# Patient Record
Sex: MALE | Race: WHITE | NOT HISPANIC OR LATINO | Employment: OTHER | URBAN - METROPOLITAN AREA
[De-identification: names, ages, dates, MRNs, and addresses within clinical notes are randomized per-mention and may not be internally consistent; named-entity substitution may affect disease eponyms.]

---

## 2023-03-08 LAB
INR IN PPP BY COAGULATION ASSAY: 3.2 (ref 0.9–1.1)
PROTHROMBIN TIME (PT) IN PPP BY COAGULATION ASSAY: 37 SEC (ref 9.8–13.4)

## 2023-03-10 ENCOUNTER — TELEPHONE (OUTPATIENT)
Dept: PRIMARY CARE | Facility: CLINIC | Age: 83
End: 2023-03-10
Payer: COMMERCIAL

## 2023-03-10 NOTE — TELEPHONE ENCOUNTER
----- Message from ROULA Gillespie sent at 3/9/2023  2:53 PM EST -----  Regarding: RE: coumadin  Recommend he continue with the current dose. Given INR was slightly elevated, let's recheck it again next week.   ----- Message -----  From: Zoraida Morales MA  Sent: 3/9/2023   2:27 PM EST  To: ROULA Gillespie  Subject: coumadin                                         Called patient and told him result and asked, he takes 10 mg 5 days a week, skipping Sunday and wednesdays.  ----- Message -----  From: ROULA Gillespie  Sent: 3/8/2023   4:53 PM EST  To:  [place] [id number] Clinical Support Staff    Please let him know his INR today was 3.2.  What is his current dose of Coumadin? How long has he been on the current dose?

## 2023-03-10 NOTE — TELEPHONE ENCOUNTER
----- Message from ROULA Gillespie sent at 3/9/2023  2:56 PM EST -----  Regarding: RE: coumadin  Recommend he continue the current dose. Given it was slightly elevated, let's recheck INR again next week.  ----- Message -----  From: Zoraida Morales MA  Sent: 3/9/2023   2:27 PM EST  To: ROULA Gillespie  Subject: coumadin                                         Called patient and told him result and asked, he takes 10 mg 5 days a week, skipping Sunday and wednesdays.  ----- Message -----  From: ROULA Gillespie  Sent: 3/8/2023   4:53 PM EST  To:  [place] [id number] Clinical Support Staff    Please let him know his INR today was 3.2.  What is his current dose of Coumadin? How long has he been on the current dose?

## 2023-03-10 NOTE — TELEPHONE ENCOUNTER
----- Message from ROULA Gillespie sent at 3/9/2023  5:35 PM EST -----  Regarding: RE: coumadin  Please have him continue the current dose of Coumadin. Since it is slightly elevated from the therapeutic range, recommend rechecking next week.  ----- Message -----  From: Zoraida Morales MA  Sent: 3/9/2023   2:27 PM EST  To: ROULA Gillespie  Subject: coumadin                                         Called patient and told him result and asked, he takes 10 mg 5 days a week, skipping Sunday and wednesdays.  ----- Message -----  From: ROULA Gillespie  Sent: 3/8/2023   4:53 PM EST  To:  [place] [address] Clinical Support Staff    Please let him know his INR today was 3.2.  What is his current dose of Coumadin? How long has he been on the current dose?

## 2023-03-13 ENCOUNTER — TELEPHONE (OUTPATIENT)
Dept: PRIMARY CARE | Facility: CLINIC | Age: 83
End: 2023-03-13
Payer: COMMERCIAL

## 2023-03-13 PROBLEM — Z96.651 STATUS POST TOTAL RIGHT KNEE REPLACEMENT: Status: ACTIVE | Noted: 2023-03-13

## 2023-03-13 PROBLEM — E66.01 CLASS 2 SEVERE OBESITY WITH SERIOUS COMORBIDITY AND BODY MASS INDEX (BMI) OF 38.0 TO 38.9 IN ADULT (MULTI): Status: ACTIVE | Noted: 2023-03-13

## 2023-03-13 PROBLEM — R60.0 LEG EDEMA, RIGHT: Status: ACTIVE | Noted: 2023-03-13

## 2023-03-13 PROBLEM — E11.9 TYPE 2 DIABETES MELLITUS (MULTI): Status: ACTIVE | Noted: 2023-03-13

## 2023-03-13 PROBLEM — L03.115 CELLULITIS OF RIGHT LOWER LEG: Status: ACTIVE | Noted: 2023-03-13

## 2023-03-13 PROBLEM — D64.9 ANEMIA: Status: ACTIVE | Noted: 2023-03-13

## 2023-03-13 PROBLEM — I51.7 DILATED VENTRICLE: Status: ACTIVE | Noted: 2023-03-13

## 2023-03-13 PROBLEM — R06.02 SOB (SHORTNESS OF BREATH) ON EXERTION: Status: ACTIVE | Noted: 2023-03-13

## 2023-03-13 PROBLEM — M79.604 RIGHT LEG PAIN: Status: ACTIVE | Noted: 2023-03-13

## 2023-03-13 PROBLEM — M17.11 ARTHRITIS OF RIGHT KNEE: Status: ACTIVE | Noted: 2023-03-13

## 2023-03-13 PROBLEM — I87.1 MAY-THURNER SYNDROME: Status: ACTIVE | Noted: 2023-03-13

## 2023-03-13 PROBLEM — C61 PROSTATE CANCER (MULTI): Status: ACTIVE | Noted: 2023-03-13

## 2023-03-13 PROBLEM — R17 TOTAL BILIRUBIN, ELEVATED: Status: ACTIVE | Noted: 2023-03-13

## 2023-03-13 PROBLEM — E78.5 HYPERLIPIDEMIA: Status: ACTIVE | Noted: 2023-03-13

## 2023-03-13 PROBLEM — I10 BENIGN ESSENTIAL HYPERTENSION: Status: ACTIVE | Noted: 2023-03-13

## 2023-03-13 PROBLEM — N52.9 ED (ERECTILE DYSFUNCTION): Status: ACTIVE | Noted: 2023-03-13

## 2023-03-13 PROBLEM — I48.91 ATRIAL FIBRILLATION (MULTI): Status: ACTIVE | Noted: 2023-03-13

## 2023-03-13 PROBLEM — N40.0 BPH (BENIGN PROSTATIC HYPERPLASIA): Status: ACTIVE | Noted: 2023-03-13

## 2023-03-13 PROBLEM — R60.9 EDEMA: Status: ACTIVE | Noted: 2023-03-13

## 2023-03-13 PROBLEM — E66.812 CLASS 2 SEVERE OBESITY WITH SERIOUS COMORBIDITY AND BODY MASS INDEX (BMI) OF 38.0 TO 38.9 IN ADULT: Status: ACTIVE | Noted: 2023-03-13

## 2023-03-13 RX ORDER — PNV NO.95/FERROUS FUM/FOLIC AC 28MG-0.8MG
1 TABLET ORAL DAILY
COMMUNITY
Start: 2020-11-19

## 2023-03-13 RX ORDER — VIT A/VIT C/VIT E/ZINC/COPPER 4296-226
CAPSULE ORAL
COMMUNITY
End: 2024-04-05 | Stop reason: SDUPTHER

## 2023-03-13 RX ORDER — GLIMEPIRIDE 2 MG/1
1 TABLET ORAL DAILY
COMMUNITY
Start: 2023-02-08 | End: 2023-07-11

## 2023-03-13 RX ORDER — ACETAMINOPHEN 500 MG
TABLET ORAL
COMMUNITY

## 2023-03-13 RX ORDER — METOPROLOL TARTRATE 50 MG/1
0.5 TABLET ORAL 2 TIMES DAILY
COMMUNITY
Start: 2014-01-23 | End: 2024-01-02

## 2023-03-13 RX ORDER — TERAZOSIN 5 MG/1
1 CAPSULE ORAL DAILY
COMMUNITY
Start: 2012-09-26 | End: 2023-12-04

## 2023-03-13 RX ORDER — SIMVASTATIN 20 MG/1
1 TABLET, FILM COATED ORAL DAILY
COMMUNITY
Start: 2012-09-26 | End: 2024-01-02

## 2023-03-13 RX ORDER — LISINOPRIL 10 MG/1
1 TABLET ORAL DAILY
COMMUNITY
Start: 2012-09-26 | End: 2024-01-02

## 2023-03-13 RX ORDER — ASPIRIN 81 MG/1
TABLET ORAL
COMMUNITY
End: 2023-12-04

## 2023-03-13 RX ORDER — WARFARIN 10 MG/1
1 TABLET ORAL DAILY
COMMUNITY
Start: 2012-09-26 | End: 2023-04-14 | Stop reason: DRUGHIGH

## 2023-03-13 RX ORDER — METFORMIN HYDROCHLORIDE 500 MG/1
2 TABLET ORAL 2 TIMES DAILY
COMMUNITY
Start: 2012-09-26 | End: 2024-01-02

## 2023-03-13 NOTE — TELEPHONE ENCOUNTER
----- Message from ROULA Gillespie sent at 3/9/2023  2:56 PM EST -----  Regarding: RE: coumadin  Recommend he continue the current dose. Given it was slightly elevated, let's recheck INR again next week.  ----- Message -----  From: Zoraida Morales MA  Sent: 3/9/2023   2:27 PM EST  To: ROULA Gillespie  Subject: coumadin                                         Called patient and told him result and asked, he takes 10 mg 5 days a week, skipping Sunday and wednesdays.  ----- Message -----  From: ROULA Gillespie  Sent: 3/8/2023   4:53 PM EST  To:  [place] [address] Clinical Support Staff    Please let him know his INR today was 3.2.  What is his current dose of Coumadin? How long has he been on the current dose?

## 2023-03-15 ENCOUNTER — LAB (OUTPATIENT)
Dept: LAB | Facility: LAB | Age: 83
End: 2023-03-15
Payer: COMMERCIAL

## 2023-03-15 DIAGNOSIS — I48.91 ATRIAL FIBRILLATION, UNSPECIFIED TYPE (MULTI): ICD-10-CM

## 2023-03-15 DIAGNOSIS — Z51.81 ENCOUNTER FOR THERAPEUTIC DRUG MONITORING: ICD-10-CM

## 2023-03-15 LAB
INR IN PPP BY COAGULATION ASSAY: 3.8 (ref 0.9–1.1)
PROTHROMBIN TIME (PT) IN PPP BY COAGULATION ASSAY: 45.1 SEC (ref 9.8–13.4)

## 2023-03-15 PROCEDURE — 36415 COLL VENOUS BLD VENIPUNCTURE: CPT

## 2023-03-15 PROCEDURE — 85610 PROTHROMBIN TIME: CPT

## 2023-03-16 ENCOUNTER — OFFICE VISIT (OUTPATIENT)
Dept: PRIMARY CARE | Facility: CLINIC | Age: 83
End: 2023-03-16
Payer: COMMERCIAL

## 2023-03-16 ENCOUNTER — TELEPHONE (OUTPATIENT)
Dept: PRIMARY CARE | Facility: CLINIC | Age: 83
End: 2023-03-16

## 2023-03-16 VITALS
HEIGHT: 70 IN | WEIGHT: 273 LBS | BODY MASS INDEX: 39.08 KG/M2 | TEMPERATURE: 97.9 F | DIASTOLIC BLOOD PRESSURE: 78 MMHG | SYSTOLIC BLOOD PRESSURE: 138 MMHG

## 2023-03-16 DIAGNOSIS — G63 POLYNEUROPATHY ASSOCIATED WITH UNDERLYING DISEASE (MULTI): Primary | ICD-10-CM

## 2023-03-16 PROCEDURE — 1160F RVW MEDS BY RX/DR IN RCRD: CPT | Performed by: NURSE PRACTITIONER

## 2023-03-16 PROCEDURE — 1036F TOBACCO NON-USER: CPT | Performed by: NURSE PRACTITIONER

## 2023-03-16 PROCEDURE — 99213 OFFICE O/P EST LOW 20 MIN: CPT | Performed by: NURSE PRACTITIONER

## 2023-03-16 PROCEDURE — 3078F DIAST BP <80 MM HG: CPT | Performed by: NURSE PRACTITIONER

## 2023-03-16 PROCEDURE — 1159F MED LIST DOCD IN RCRD: CPT | Performed by: NURSE PRACTITIONER

## 2023-03-16 PROCEDURE — 3075F SYST BP GE 130 - 139MM HG: CPT | Performed by: NURSE PRACTITIONER

## 2023-03-16 RX ORDER — AMOXICILLIN 500 MG/1
CAPSULE ORAL
COMMUNITY
Start: 2022-08-30 | End: 2023-04-02 | Stop reason: SDUPTHER

## 2023-03-16 ASSESSMENT — PATIENT HEALTH QUESTIONNAIRE - PHQ9
2. FEELING DOWN, DEPRESSED OR HOPELESS: NOT AT ALL
1. LITTLE INTEREST OR PLEASURE IN DOING THINGS: NOT AT ALL
SUM OF ALL RESPONSES TO PHQ9 QUESTIONS 1 AND 2: 0

## 2023-03-16 ASSESSMENT — ENCOUNTER SYMPTOMS: CONSTITUTIONAL NEGATIVE: 1

## 2023-03-16 NOTE — TELEPHONE ENCOUNTER
----- Message from ROULA Gillespie sent at 3/16/2023  1:22 PM EDT -----  Please let him know his INR is continuing to trend up. Is he on any antibiotics currently or in the past week? We need to decrease his dose. What dose tablets does he have at home?

## 2023-03-16 NOTE — TELEPHONE ENCOUNTER
He has not been on any antibiotics recently.  10 mg 5 days a week. Patient skips Sunday and Wednesday. He has 10 mg tablets at home.   Per Meredith : No anitbiotics ?Let's have him take Coumadin 5 mg Thursday, Friday, Saturday, skip Sunday like he usually does and then recheck again on Monday.   Patient verbalized understanding.
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
## 2023-03-17 DIAGNOSIS — I48.91 ATRIAL FIBRILLATION, UNSPECIFIED TYPE (MULTI): Primary | ICD-10-CM

## 2023-03-17 NOTE — TELEPHONE ENCOUNTER
Per Meredith's direction patient is to cut the 10mg tablets in half to get the 5mg he needs to take: Coumadin 5 mg Thursday, Friday, Saturday, skip Sunday like he usually does and then recheck again on Monday.

## 2023-03-20 ENCOUNTER — LAB (OUTPATIENT)
Dept: LAB | Facility: LAB | Age: 83
End: 2023-03-20
Payer: COMMERCIAL

## 2023-03-20 DIAGNOSIS — I48.91 ATRIAL FIBRILLATION, UNSPECIFIED TYPE (MULTI): ICD-10-CM

## 2023-03-20 LAB
INR IN PPP BY COAGULATION ASSAY: 1.5 (ref 0.9–1.1)
PROTHROMBIN TIME (PT) IN PPP BY COAGULATION ASSAY: 17.8 SEC (ref 9.8–13.4)

## 2023-03-20 PROCEDURE — 36415 COLL VENOUS BLD VENIPUNCTURE: CPT

## 2023-03-20 PROCEDURE — 85610 PROTHROMBIN TIME: CPT

## 2023-03-21 ENCOUNTER — TELEPHONE (OUTPATIENT)
Dept: PRIMARY CARE | Facility: CLINIC | Age: 83
End: 2023-03-21
Payer: COMMERCIAL

## 2023-03-21 NOTE — TELEPHONE ENCOUNTER
Relayed message to patient, he verbalized understanding. States he has enough 10mg to break in half. He states he already too 10mg today and will skip tomorrow

## 2023-03-21 NOTE — TELEPHONE ENCOUNTER
----- Message from ROULA Gillespie sent at 3/21/2023  1:54 PM EDT -----  Let him know his INR is now too low at 1.5. I would like to have him continue to take Coumadin 5 mg daily (0.5 tablet of the 10 mg tablet). He should not skip Sunday or Wednesday this week. He should recheck INR on Monday.

## 2023-03-28 ENCOUNTER — LAB (OUTPATIENT)
Dept: LAB | Facility: LAB | Age: 83
End: 2023-03-28
Payer: COMMERCIAL

## 2023-03-28 DIAGNOSIS — I48.91 ATRIAL FIBRILLATION, UNSPECIFIED TYPE (MULTI): ICD-10-CM

## 2023-03-28 LAB
INR IN PPP BY COAGULATION ASSAY: 1.4 (ref 0.9–1.1)
PROTHROMBIN TIME (PT) IN PPP BY COAGULATION ASSAY: 16.1 SEC (ref 9.8–13.4)

## 2023-03-28 PROCEDURE — 85610 PROTHROMBIN TIME: CPT

## 2023-03-28 PROCEDURE — 36415 COLL VENOUS BLD VENIPUNCTURE: CPT

## 2023-03-29 ENCOUNTER — TELEPHONE (OUTPATIENT)
Dept: PRIMARY CARE | Facility: CLINIC | Age: 83
End: 2023-03-29
Payer: COMMERCIAL

## 2023-03-29 DIAGNOSIS — I48.91 ATRIAL FIBRILLATION, UNSPECIFIED TYPE (MULTI): Primary | ICD-10-CM

## 2023-03-29 RX ORDER — WARFARIN SODIUM 5 MG/1
7.5 TABLET ORAL EVERY EVENING
Qty: 45 TABLET | Refills: 0 | Status: SHIPPED | OUTPATIENT
Start: 2023-03-29 | End: 2023-04-14 | Stop reason: DRUGHIGH

## 2023-03-29 NOTE — TELEPHONE ENCOUNTER
----- Message from ROULA Gillespie sent at 3/29/2023  7:22 AM EDT -----  Please disregard prior message. I want him to take Coumadin 7.5 mg every day.  I will send in Coumadin 5 mg tablets- I would like him to take 1.5 tablets of the 5 mg.  Recheck INR in one week.  ----- Message -----  From: Renee Poon MA  Sent: 3/29/2023   7:16 AM EDT  To: ROULA Gillespie    Please correct directions   ----- Message -----  From: ROULA Gillespie  Sent: 3/28/2023   5:21 PM EDT  To: Renee Poon MA    Let him know that his INR was still too low at 1.4.  Lets have him take 7.5 mg every day (1.5 tablets of his 10 mg tablets).  Recheck INR again in 1 week.

## 2023-03-29 NOTE — TELEPHONE ENCOUNTER
Spoke with Patient and he verbalized understanding. He verified that [facility] is his current pharmacy.

## 2023-03-30 ENCOUNTER — TELEPHONE (OUTPATIENT)
Dept: PRIMARY CARE | Facility: CLINIC | Age: 83
End: 2023-03-30
Payer: COMMERCIAL

## 2023-03-30 NOTE — TELEPHONE ENCOUNTER
Patient left message requesting an Rx for prophylactic Amoxicillin for an upcoming dental appointment due to prior knee surgery. He has a new dentist that requires his PCP to write the Rx for the first time, pharmacy is [facility], please advise

## 2023-03-31 DIAGNOSIS — M17.11 ARTHRITIS OF RIGHT KNEE: Primary | ICD-10-CM

## 2023-03-31 NOTE — TELEPHONE ENCOUNTER
Pt had knee surgery 5 years ago. Since he had that surgery he has to get Amoxacillin prior to going to the Dentist. He switched to a new dentist and they said he had to get that antibiotic from you and once you send it for his first appointment they will fill it going forward prior to his appointments.    His apt with the new Dentist is on Tuesday April 5th. Thank you!

## 2023-04-02 RX ORDER — AMOXICILLIN 500 MG/1
CAPSULE ORAL
Qty: 4 CAPSULE | Refills: 1 | Status: SHIPPED | OUTPATIENT
Start: 2023-04-02 | End: 2024-04-08 | Stop reason: ALTCHOICE

## 2023-04-05 ENCOUNTER — LAB (OUTPATIENT)
Dept: LAB | Facility: LAB | Age: 83
End: 2023-04-05
Payer: COMMERCIAL

## 2023-04-05 DIAGNOSIS — I48.91 ATRIAL FIBRILLATION, UNSPECIFIED TYPE (MULTI): ICD-10-CM

## 2023-04-05 LAB
INR IN PPP BY COAGULATION ASSAY: 2.5 (ref 0.9–1.1)
PROTHROMBIN TIME (PT) IN PPP BY COAGULATION ASSAY: 28.8 SEC (ref 9.8–13.4)

## 2023-04-05 PROCEDURE — 85610 PROTHROMBIN TIME: CPT

## 2023-04-05 PROCEDURE — 36415 COLL VENOUS BLD VENIPUNCTURE: CPT

## 2023-04-06 NOTE — TELEPHONE ENCOUNTER
----- Message from TRUDI Gillespie-CNP sent at 4/6/2023  9:50 AM EDT -----  Please let him know his INR was in a good range at 2.5. I want him to continue Coumadin 7.5 mg (1.5 tablets of the 5 mg tablets) daily. Let's recheck in one week just one more time and if in the normal range will start to space them out a little further.

## 2023-04-12 ENCOUNTER — LAB (OUTPATIENT)
Dept: LAB | Facility: LAB | Age: 83
End: 2023-04-12
Payer: COMMERCIAL

## 2023-04-12 DIAGNOSIS — I48.91 ATRIAL FIBRILLATION, UNSPECIFIED TYPE (MULTI): ICD-10-CM

## 2023-04-12 LAB
INR IN PPP BY COAGULATION ASSAY: 2.4 (ref 0.9–1.1)
PROTHROMBIN TIME (PT) IN PPP BY COAGULATION ASSAY: 27.9 SEC (ref 9.8–13.4)

## 2023-04-12 PROCEDURE — 85610 PROTHROMBIN TIME: CPT

## 2023-04-12 PROCEDURE — 36415 COLL VENOUS BLD VENIPUNCTURE: CPT

## 2023-04-13 ENCOUNTER — ANTICOAGULATION - WARFARIN VISIT (OUTPATIENT)
Dept: PRIMARY CARE | Facility: CLINIC | Age: 83
End: 2023-04-13
Payer: COMMERCIAL

## 2023-04-13 ENCOUNTER — TELEPHONE (OUTPATIENT)
Dept: PRIMARY CARE | Facility: CLINIC | Age: 83
End: 2023-04-13
Payer: COMMERCIAL

## 2023-04-13 NOTE — TELEPHONE ENCOUNTER
Left patient a detailed message letting him know we can send in the 7.5mg - take 1 tab daily- so he doesn't need to cut the tabs in half. Asked that he call back if he prefer the 5mgs.

## 2023-04-13 NOTE — TELEPHONE ENCOUNTER
----- Message from ROULA Gillespie sent at 4/13/2023  8:57 AM EDT -----  Please let him know his INR is therapeutic at 2.4.  He should continue the Coumadin 7.5 mg daily.  Lets plan to recheck an INR in 2 weeks.  Does he need refills on his Coumadin?

## 2023-04-13 NOTE — TELEPHONE ENCOUNTER
Spoke with patient, he verbalized understanding.   He will need refills. He is requesting to continue taking 5mgs and cut a 5mg in half to total 7.5mg (unless theres a 7.5mg tablet) states itll be cheaper to stick with one dose rather than getting 5mg and 2.5mgs please advise. Hes requesting 90 day supply with refills. [facility] [place]

## 2023-04-14 DIAGNOSIS — I48.91 ATRIAL FIBRILLATION, UNSPECIFIED TYPE (MULTI): Primary | ICD-10-CM

## 2023-04-14 RX ORDER — WARFARIN 7.5 MG/1
7.5 TABLET ORAL NIGHTLY
Qty: 90 TABLET | Refills: 0 | Status: SHIPPED | OUTPATIENT
Start: 2023-04-14 | End: 2023-07-07

## 2023-04-24 ENCOUNTER — TELEPHONE (OUTPATIENT)
Dept: PRIMARY CARE | Facility: CLINIC | Age: 83
End: 2023-04-24
Payer: COMMERCIAL

## 2023-04-24 DIAGNOSIS — U07.1 COVID-19: Primary | ICD-10-CM

## 2023-04-24 NOTE — TELEPHONE ENCOUNTER
Sx started 4/21/23  Dw pt r/b/se paxlovid   Hold simvastatin.   Get INR on Thursday   Gfr >60 in last 3 months

## 2023-04-24 NOTE — TELEPHONE ENCOUNTER
Patient tested positive for COVID yesterday. He is diabetic and wants to know if he can have Paxlovid. He received a cortisone inject last week in the knee and wants to make sure its ok to take paxlovid. [facility] in [place]

## 2023-04-27 ENCOUNTER — ANTICOAGULATION - WARFARIN VISIT (OUTPATIENT)
Dept: PRIMARY CARE | Facility: CLINIC | Age: 83
End: 2023-04-27

## 2023-04-27 ENCOUNTER — LAB (OUTPATIENT)
Dept: LAB | Facility: LAB | Age: 83
End: 2023-04-27
Payer: COMMERCIAL

## 2023-04-27 DIAGNOSIS — I48.91 ATRIAL FIBRILLATION, UNSPECIFIED TYPE (MULTI): ICD-10-CM

## 2023-04-27 LAB
INR IN PPP BY COAGULATION ASSAY: 2.9 (ref 0.9–1.1)
PROSTATE SPECIFIC AG (NG/ML) IN SER/PLAS: 1.81 NG/ML (ref 0–4)
PROTHROMBIN TIME (PT) IN PPP BY COAGULATION ASSAY: 34.1 SEC (ref 9.8–13.4)

## 2023-04-27 PROCEDURE — 36415 COLL VENOUS BLD VENIPUNCTURE: CPT

## 2023-04-27 PROCEDURE — 85610 PROTHROMBIN TIME: CPT

## 2023-04-28 ENCOUNTER — ANTICOAGULATION - WARFARIN VISIT (OUTPATIENT)
Dept: PRIMARY CARE | Facility: CLINIC | Age: 83
End: 2023-04-28
Payer: COMMERCIAL

## 2023-05-11 ENCOUNTER — LAB (OUTPATIENT)
Dept: LAB | Facility: LAB | Age: 83
End: 2023-05-11
Payer: COMMERCIAL

## 2023-05-11 DIAGNOSIS — I48.91 ATRIAL FIBRILLATION, UNSPECIFIED TYPE (MULTI): ICD-10-CM

## 2023-05-11 LAB
INR IN PPP BY COAGULATION ASSAY: 4.6 (ref 0.9–1.1)
PROTHROMBIN TIME (PT) IN PPP BY COAGULATION ASSAY: 54.7 SEC (ref 9.8–13.4)

## 2023-05-11 PROCEDURE — 36415 COLL VENOUS BLD VENIPUNCTURE: CPT

## 2023-05-11 PROCEDURE — 85610 PROTHROMBIN TIME: CPT

## 2023-05-12 ENCOUNTER — ANTICOAGULATION - WARFARIN VISIT (OUTPATIENT)
Dept: PRIMARY CARE | Facility: CLINIC | Age: 83
End: 2023-05-12
Payer: COMMERCIAL

## 2023-05-12 ENCOUNTER — TELEPHONE (OUTPATIENT)
Dept: PRIMARY CARE | Facility: CLINIC | Age: 83
End: 2023-05-12
Payer: COMMERCIAL

## 2023-05-12 NOTE — TELEPHONE ENCOUNTER
----- Message from Ivan Parrish DO sent at 5/12/2023 11:59 AM EDT -----   Tell pt inr too high- is he havign any bleeding? Hold today and tommorow. Take Sunday normla dose and rehcek monday     ##DO NOT CLOSE UNTIL YOU SPEAK TO PATIENT##

## 2023-05-15 ENCOUNTER — LAB (OUTPATIENT)
Dept: LAB | Facility: LAB | Age: 83
End: 2023-05-15
Payer: COMMERCIAL

## 2023-05-15 DIAGNOSIS — I48.91 ATRIAL FIBRILLATION, UNSPECIFIED TYPE (MULTI): ICD-10-CM

## 2023-05-15 DIAGNOSIS — Z51.81 ENCOUNTER FOR THERAPEUTIC DRUG MONITORING: ICD-10-CM

## 2023-05-15 LAB
INR IN PPP BY COAGULATION ASSAY: 1.5 (ref 0.9–1.1)
PROTHROMBIN TIME (PT) IN PPP BY COAGULATION ASSAY: 18 SEC (ref 9.8–13.4)

## 2023-05-15 PROCEDURE — 36415 COLL VENOUS BLD VENIPUNCTURE: CPT

## 2023-05-15 PROCEDURE — 85610 PROTHROMBIN TIME: CPT

## 2023-05-22 ENCOUNTER — LAB (OUTPATIENT)
Dept: LAB | Facility: LAB | Age: 83
End: 2023-05-22
Payer: COMMERCIAL

## 2023-05-22 DIAGNOSIS — U07.1 COVID-19: ICD-10-CM

## 2023-05-22 LAB
INR IN PPP BY COAGULATION ASSAY: 2.9 (ref 0.9–1.1)
PROTHROMBIN TIME (PT) IN PPP BY COAGULATION ASSAY: 33.5 SEC (ref 9.8–13.4)

## 2023-05-22 PROCEDURE — 36415 COLL VENOUS BLD VENIPUNCTURE: CPT

## 2023-05-22 PROCEDURE — 85610 PROTHROMBIN TIME: CPT

## 2023-05-30 LAB
ESTIMATED AVERAGE GLUCOSE FOR HBA1C: 177 MG/DL
HEMOGLOBIN A1C/HEMOGLOBIN TOTAL IN BLOOD: 7.8 %
INR IN PPP BY COAGULATION ASSAY: 4 (ref 0.9–1.1)
PROTHROMBIN TIME (PT) IN PPP BY COAGULATION ASSAY: 46.7 SEC (ref 9.8–13.4)

## 2023-06-05 ENCOUNTER — TELEPHONE (OUTPATIENT)
Dept: PRIMARY CARE | Facility: CLINIC | Age: 83
End: 2023-06-05
Payer: COMMERCIAL

## 2023-06-08 LAB
INR IN PPP BY COAGULATION ASSAY: 2.8 (ref 0.9–1.1)
PROTHROMBIN TIME (PT) IN PPP BY COAGULATION ASSAY: 33.1 SEC (ref 9.8–13.4)

## 2023-06-09 ENCOUNTER — ANTICOAGULATION - WARFARIN VISIT (OUTPATIENT)
Dept: PRIMARY CARE | Facility: CLINIC | Age: 83
End: 2023-06-09
Payer: COMMERCIAL

## 2023-06-14 ENCOUNTER — OFFICE VISIT (OUTPATIENT)
Dept: PRIMARY CARE | Facility: CLINIC | Age: 83
End: 2023-06-14
Payer: MEDICARE

## 2023-06-14 VITALS
TEMPERATURE: 97.7 F | DIASTOLIC BLOOD PRESSURE: 70 MMHG | BODY MASS INDEX: 38.14 KG/M2 | WEIGHT: 265.8 LBS | SYSTOLIC BLOOD PRESSURE: 140 MMHG

## 2023-06-14 DIAGNOSIS — R30.0 DYSURIA: ICD-10-CM

## 2023-06-14 DIAGNOSIS — C61 PROSTATE CANCER (MULTI): ICD-10-CM

## 2023-06-14 DIAGNOSIS — E11.69 TYPE 2 DIABETES MELLITUS WITH OTHER SPECIFIED COMPLICATION, WITHOUT LONG-TERM CURRENT USE OF INSULIN (MULTI): ICD-10-CM

## 2023-06-14 DIAGNOSIS — I48.91 ATRIAL FIBRILLATION, UNSPECIFIED TYPE (MULTI): Primary | ICD-10-CM

## 2023-06-14 DIAGNOSIS — N30.01 ACUTE CYSTITIS WITH HEMATURIA: ICD-10-CM

## 2023-06-14 DIAGNOSIS — E78.5 HYPERLIPIDEMIA, UNSPECIFIED HYPERLIPIDEMIA TYPE: ICD-10-CM

## 2023-06-14 LAB
POC ALBUMIN /CREATININE RATIO MANUALLY ENTERED: ABNORMAL UG/MG CREAT
POC APPEARANCE, URINE: ABNORMAL
POC BILIRUBIN, URINE: NEGATIVE
POC BLOOD, URINE: ABNORMAL
POC COLOR, URINE: ABNORMAL
POC GLUCOSE, URINE: NEGATIVE MG/DL
POC KETONES, URINE: NEGATIVE MG/DL
POC LEUKOCYTES, URINE: ABNORMAL
POC NITRITE,URINE: NEGATIVE
POC PH, URINE: 7 PH
POC PROTEIN, URINE: ABNORMAL MG/DL
POC SPECIFIC GRAVITY, URINE: 1.02
POC URINE ALBUMIN: 150 MG/L
POC URINE CREATININE: 100 MG/DL
POC UROBILINOGEN, URINE: 1 EU/DL

## 2023-06-14 PROCEDURE — 87086 URINE CULTURE/COLONY COUNT: CPT

## 2023-06-14 PROCEDURE — 81003 URINALYSIS AUTO W/O SCOPE: CPT | Performed by: FAMILY MEDICINE

## 2023-06-14 PROCEDURE — 87077 CULTURE AEROBIC IDENTIFY: CPT

## 2023-06-14 PROCEDURE — 3077F SYST BP >= 140 MM HG: CPT | Performed by: FAMILY MEDICINE

## 2023-06-14 PROCEDURE — 1159F MED LIST DOCD IN RCRD: CPT | Performed by: FAMILY MEDICINE

## 2023-06-14 PROCEDURE — 1036F TOBACCO NON-USER: CPT | Performed by: FAMILY MEDICINE

## 2023-06-14 PROCEDURE — 82044 UR ALBUMIN SEMIQUANTITATIVE: CPT | Performed by: FAMILY MEDICINE

## 2023-06-14 PROCEDURE — 99214 OFFICE O/P EST MOD 30 MIN: CPT | Performed by: FAMILY MEDICINE

## 2023-06-14 PROCEDURE — 87186 SC STD MICRODIL/AGAR DIL: CPT

## 2023-06-14 PROCEDURE — 1160F RVW MEDS BY RX/DR IN RCRD: CPT | Performed by: FAMILY MEDICINE

## 2023-06-14 PROCEDURE — 3078F DIAST BP <80 MM HG: CPT | Performed by: FAMILY MEDICINE

## 2023-06-14 RX ORDER — NITROFURANTOIN 25; 75 MG/1; MG/1
100 CAPSULE ORAL 2 TIMES DAILY
Qty: 14 CAPSULE | Refills: 0 | Status: SHIPPED | OUTPATIENT
Start: 2023-06-14 | End: 2023-06-21

## 2023-06-14 ASSESSMENT — PATIENT HEALTH QUESTIONNAIRE - PHQ9
SUM OF ALL RESPONSES TO PHQ9 QUESTIONS 1 AND 2: 0
2. FEELING DOWN, DEPRESSED OR HOPELESS: NOT AT ALL
1. LITTLE INTEREST OR PLEASURE IN DOING THINGS: NOT AT ALL

## 2023-06-14 NOTE — PROGRESS NOTES
Subjective   Patient ID: Cade Olivas is a 83 y.o. male who presents for Follow-up.    HPI     CARE TEAM  urology- Flick follows for erectile dysfunction, BPH as well as prostate cancer status post radiation therapy in 2014. Now on active surveillance  ortho- Silver -follows as needed for knee replacement 10/18  vascular-may thurner    C/o possible uti in last few days   Burning with urination   ? Blood   No fevers, chills, nausea or vomiting     Compression stockings are helping swelling     Taking all meds as directed    Review of Systems  As noted hpi otherwise 10 point ros negative   Objective   /70   Temp 36.5 °C (97.7 °F)   Wt 121 kg (265 lb 12.8 oz)   BMI 38.14 kg/m²     Physical Exam  Vitals and nursing note reviewed.   Constitutional:       Appearance: Normal appearance.   HENT:      Head: Normocephalic and atraumatic.      Nose: Nose normal.      Mouth/Throat:      Mouth: Mucous membranes are moist.   Eyes:      Pupils: Pupils are equal, round, and reactive to light.   Cardiovascular:      Rate and Rhythm: Normal rate and regular rhythm.      Pulses: Normal pulses.      Heart sounds: Normal heart sounds.   Pulmonary:      Effort: Pulmonary effort is normal.      Breath sounds: Normal breath sounds.   Abdominal:      General: Abdomen is flat.      Tenderness: There is no abdominal tenderness. There is no right CVA tenderness or left CVA tenderness.   Musculoskeletal:         General: No swelling. Normal range of motion.      Cervical back: Normal range of motion and neck supple.   Skin:     Capillary Refill: Capillary refill takes less than 2 seconds.   Neurological:      General: No focal deficit present.      Mental Status: He is alert. Mental status is at baseline.   Psychiatric:         Mood and Affect: Mood normal.         Behavior: Behavior normal.         Thought Content: Thought content normal.         Judgment: Judgment normal.         Assessment/Plan   1. Atrial fibrillation,  unspecified type (CMS/[facility])  Recheck inr 2 weeks unless hematuria does not stop  - CBC and Auto Differential; Future    2. Prostate cancer (CMS/[facility])  Cont with urology    3. Type 2 diabetes mellitus with other specified complication, without long-term current use of insulin (CMS/[facility])  Labs due fu tbd  - POCT Albumin Random Urine manually resulted  - Hemoglobin A1C; Future  - Comprehensive metabolic panel; Future    4. Hyperlipidemia, unspecified hyperlipidemia type  ccm    5. Dysuria  Tx uti. Call inb  - POCT UA Automated manually resulted  - CBC and Auto Differential; Future  - Urine Culture; Future  - nitrofurantoin, macrocrystal-monohydrate, (Macrobid) 100 mg capsule; Take 1 capsule (100 mg) by mouth 2 times a day for 7 days.  Dispense: 14 capsule; Refill: 0  - Urine Culture    6. Acute cystitis with hematuria  As above       Ivan Parrish, DO

## 2023-06-16 LAB — URINE CULTURE: ABNORMAL

## 2023-07-03 ENCOUNTER — ANTICOAGULATION - WARFARIN VISIT (OUTPATIENT)
Dept: PRIMARY CARE | Facility: CLINIC | Age: 83
End: 2023-07-03
Payer: MEDICARE

## 2023-07-07 DIAGNOSIS — I48.91 ATRIAL FIBRILLATION, UNSPECIFIED TYPE (MULTI): ICD-10-CM

## 2023-07-07 RX ORDER — WARFARIN 7.5 MG/1
TABLET ORAL
Qty: 90 TABLET | Refills: 0 | Status: SHIPPED | OUTPATIENT
Start: 2023-07-07

## 2023-07-11 DIAGNOSIS — E11.69 TYPE 2 DIABETES MELLITUS WITH OTHER SPECIFIED COMPLICATION, WITHOUT LONG-TERM CURRENT USE OF INSULIN (MULTI): Primary | ICD-10-CM

## 2023-07-11 RX ORDER — GLIMEPIRIDE 2 MG/1
TABLET ORAL
Qty: 90 TABLET | Refills: 0 | Status: SHIPPED | OUTPATIENT
Start: 2023-07-11 | End: 2023-10-06

## 2023-07-21 LAB
INR IN PPP BY COAGULATION ASSAY: 5.3 (ref 0.9–1.1)
PROTHROMBIN TIME (PT) IN PPP BY COAGULATION ASSAY: 60.6 SEC (ref 9.8–12.8)

## 2023-07-24 ENCOUNTER — LAB (OUTPATIENT)
Dept: LAB | Facility: LAB | Age: 83
End: 2023-07-24
Payer: MEDICARE

## 2023-07-24 DIAGNOSIS — Z51.81 ENCOUNTER FOR THERAPEUTIC DRUG MONITORING: ICD-10-CM

## 2023-07-24 DIAGNOSIS — I48.91 ATRIAL FIBRILLATION, UNSPECIFIED TYPE (MULTI): ICD-10-CM

## 2023-07-24 LAB
INR IN PPP BY COAGULATION ASSAY: 2.6 (ref 0.9–1.1)
PROTHROMBIN TIME (PT) IN PPP BY COAGULATION ASSAY: 30 SEC (ref 9.8–12.8)

## 2023-07-24 PROCEDURE — 85610 PROTHROMBIN TIME: CPT

## 2023-07-24 PROCEDURE — 36415 COLL VENOUS BLD VENIPUNCTURE: CPT

## 2023-07-25 ENCOUNTER — TELEPHONE (OUTPATIENT)
Dept: PRIMARY CARE | Facility: CLINIC | Age: 83
End: 2023-07-25
Payer: MEDICARE

## 2023-07-25 NOTE — TELEPHONE ENCOUNTER
"Spoke with patient, he verbalized understanding.    Just for clarification for  \"resume regular dosing\"- he's currently taking 10-5-10-7.5 repeat. You would like him to continue this dosing not go back to his 10mg  5 days a week (he was considering 10mg 5 days a week as his regular dosing)   "

## 2023-07-25 NOTE — TELEPHONE ENCOUNTER
----- Message from Ivan Parrish DO sent at 7/24/2023 10:05 PM EDT -----  Let pt know to resume regular doingnow and check in 1 week

## 2023-08-02 LAB
INR IN PPP BY COAGULATION ASSAY: 4.3 (ref 0.9–1.1)
PROSTATE SPECIFIC AG (NG/ML) IN SER/PLAS: 2.31 NG/ML (ref 0–4)
PROTHROMBIN TIME (PT) IN PPP BY COAGULATION ASSAY: 49.3 SEC (ref 9.8–12.8)

## 2023-08-03 ENCOUNTER — TELEPHONE (OUTPATIENT)
Dept: PRIMARY CARE | Facility: CLINIC | Age: 83
End: 2023-08-03
Payer: MEDICARE

## 2023-08-03 NOTE — TELEPHONE ENCOUNTER
----- Message from Ivan Parrish, DO sent at 8/2/2023  9:16 PM EDT -----  Let patient know his INR is high again.  I know he is going 10/5/10 7.5 then repeat  Lets change this to be 10/5 alternating each day and recheck in 1 week as long as he is not having any bleeding  If he is having bleeding or other concerns please address with in-house provider as I will be out of town

## 2023-08-11 ENCOUNTER — TELEPHONE (OUTPATIENT)
Dept: PRIMARY CARE | Facility: CLINIC | Age: 83
End: 2023-08-11

## 2023-08-11 ENCOUNTER — LAB (OUTPATIENT)
Dept: LAB | Facility: LAB | Age: 83
End: 2023-08-11
Payer: MEDICARE

## 2023-08-11 DIAGNOSIS — I48.91 ATRIAL FIBRILLATION, UNSPECIFIED TYPE (MULTI): ICD-10-CM

## 2023-08-11 DIAGNOSIS — Z51.81 ENCOUNTER FOR THERAPEUTIC DRUG MONITORING: ICD-10-CM

## 2023-08-11 LAB
INR IN PPP BY COAGULATION ASSAY: 3.4 (ref 0.9–1.1)
PROTHROMBIN TIME (PT) IN PPP BY COAGULATION ASSAY: 39.4 SEC (ref 9.8–12.8)

## 2023-08-11 PROCEDURE — 36415 COLL VENOUS BLD VENIPUNCTURE: CPT

## 2023-08-11 PROCEDURE — 85610 PROTHROMBIN TIME: CPT

## 2023-08-11 NOTE — TELEPHONE ENCOUNTER
----- Message from Ivan Parrish DO sent at 8/11/2023  3:02 PM EDT -----  Let patient know INR improved but still a little high. Please ask him to lower his total weekly dose by 2.5 and recheck in one week.

## 2023-08-18 ENCOUNTER — LAB (OUTPATIENT)
Dept: LAB | Facility: LAB | Age: 83
End: 2023-08-18
Payer: MEDICARE

## 2023-08-18 DIAGNOSIS — I48.91 ATRIAL FIBRILLATION, UNSPECIFIED TYPE (MULTI): ICD-10-CM

## 2023-08-18 DIAGNOSIS — R30.0 DYSURIA: ICD-10-CM

## 2023-08-18 DIAGNOSIS — E11.69 TYPE 2 DIABETES MELLITUS WITH OTHER SPECIFIED COMPLICATION, WITHOUT LONG-TERM CURRENT USE OF INSULIN (MULTI): ICD-10-CM

## 2023-08-18 DIAGNOSIS — Z51.81 ENCOUNTER FOR THERAPEUTIC DRUG MONITORING: ICD-10-CM

## 2023-08-18 LAB
ALANINE AMINOTRANSFERASE (SGPT) (U/L) IN SER/PLAS: 14 U/L (ref 10–52)
ALBUMIN (G/DL) IN SER/PLAS: 3.8 G/DL (ref 3.4–5)
ALKALINE PHOSPHATASE (U/L) IN SER/PLAS: 64 U/L (ref 33–136)
ANION GAP IN SER/PLAS: 11 MMOL/L (ref 10–20)
ASPARTATE AMINOTRANSFERASE (SGOT) (U/L) IN SER/PLAS: 13 U/L (ref 9–39)
BASOPHILS (10*3/UL) IN BLOOD BY AUTOMATED COUNT: 0.04 X10E9/L (ref 0–0.1)
BASOPHILS/100 LEUKOCYTES IN BLOOD BY AUTOMATED COUNT: 0.6 % (ref 0–2)
BILIRUBIN TOTAL (MG/DL) IN SER/PLAS: 0.9 MG/DL (ref 0–1.2)
CALCIUM (MG/DL) IN SER/PLAS: 8.9 MG/DL (ref 8.6–10.3)
CARBON DIOXIDE, TOTAL (MMOL/L) IN SER/PLAS: 27 MMOL/L (ref 21–32)
CHLORIDE (MMOL/L) IN SER/PLAS: 109 MMOL/L (ref 98–107)
CREATININE (MG/DL) IN SER/PLAS: 0.74 MG/DL (ref 0.5–1.3)
EOSINOPHILS (10*3/UL) IN BLOOD BY AUTOMATED COUNT: 0.13 X10E9/L (ref 0–0.4)
EOSINOPHILS/100 LEUKOCYTES IN BLOOD BY AUTOMATED COUNT: 2 % (ref 0–6)
ERYTHROCYTE DISTRIBUTION WIDTH (RATIO) BY AUTOMATED COUNT: 15.8 % (ref 11.5–14.5)
ERYTHROCYTE MEAN CORPUSCULAR HEMOGLOBIN CONCENTRATION (G/DL) BY AUTOMATED: 32.3 G/DL (ref 32–36)
ERYTHROCYTE MEAN CORPUSCULAR VOLUME (FL) BY AUTOMATED COUNT: 97 FL (ref 80–100)
ERYTHROCYTES (10*6/UL) IN BLOOD BY AUTOMATED COUNT: 3.82 X10E12/L (ref 4.5–5.9)
ESTIMATED AVERAGE GLUCOSE FOR HBA1C: 166 MG/DL
GFR MALE: 90 ML/MIN/1.73M2
GLUCOSE (MG/DL) IN SER/PLAS: 103 MG/DL (ref 74–99)
HEMATOCRIT (%) IN BLOOD BY AUTOMATED COUNT: 37.2 % (ref 41–52)
HEMOGLOBIN (G/DL) IN BLOOD: 12 G/DL (ref 13.5–17.5)
HEMOGLOBIN A1C/HEMOGLOBIN TOTAL IN BLOOD: 7.4 %
IMMATURE GRANULOCYTES/100 LEUKOCYTES IN BLOOD BY AUTOMATED COUNT: 0.3 % (ref 0–0.9)
INR IN PPP BY COAGULATION ASSAY: 4.4 (ref 0.9–1.1)
LEUKOCYTES (10*3/UL) IN BLOOD BY AUTOMATED COUNT: 6.5 X10E9/L (ref 4.4–11.3)
LYMPHOCYTES (10*3/UL) IN BLOOD BY AUTOMATED COUNT: 1.57 X10E9/L (ref 0.8–3)
LYMPHOCYTES/100 LEUKOCYTES IN BLOOD BY AUTOMATED COUNT: 24 % (ref 13–44)
MONOCYTES (10*3/UL) IN BLOOD BY AUTOMATED COUNT: 0.79 X10E9/L (ref 0.05–0.8)
MONOCYTES/100 LEUKOCYTES IN BLOOD BY AUTOMATED COUNT: 12.1 % (ref 2–10)
NEUTROPHILS (10*3/UL) IN BLOOD BY AUTOMATED COUNT: 3.99 X10E9/L (ref 1.6–5.5)
NEUTROPHILS/100 LEUKOCYTES IN BLOOD BY AUTOMATED COUNT: 61 % (ref 40–80)
PLATELETS (10*3/UL) IN BLOOD AUTOMATED COUNT: 190 X10E9/L (ref 150–450)
POTASSIUM (MMOL/L) IN SER/PLAS: 4.6 MMOL/L (ref 3.5–5.3)
PROTEIN TOTAL: 6.1 G/DL (ref 6.4–8.2)
PROTHROMBIN TIME (PT) IN PPP BY COAGULATION ASSAY: 50 SEC (ref 9.8–12.8)
SODIUM (MMOL/L) IN SER/PLAS: 142 MMOL/L (ref 136–145)
UREA NITROGEN (MG/DL) IN SER/PLAS: 15 MG/DL (ref 6–23)

## 2023-08-18 PROCEDURE — 85025 COMPLETE CBC W/AUTO DIFF WBC: CPT

## 2023-08-18 PROCEDURE — 36415 COLL VENOUS BLD VENIPUNCTURE: CPT

## 2023-08-18 PROCEDURE — 83036 HEMOGLOBIN GLYCOSYLATED A1C: CPT

## 2023-08-18 PROCEDURE — 85610 PROTHROMBIN TIME: CPT

## 2023-08-18 PROCEDURE — 80053 COMPREHEN METABOLIC PANEL: CPT

## 2023-08-21 DIAGNOSIS — R30.0 DYSURIA: Primary | ICD-10-CM

## 2023-08-22 ENCOUNTER — LAB (OUTPATIENT)
Dept: LAB | Facility: LAB | Age: 83
End: 2023-08-22
Payer: MEDICARE

## 2023-08-22 DIAGNOSIS — R30.0 DYSURIA: ICD-10-CM

## 2023-08-22 LAB
APPEARANCE, URINE: ABNORMAL
BACTERIA, URINE: ABNORMAL /HPF
BILIRUBIN, URINE: NEGATIVE
BLOOD, URINE: NEGATIVE
COLOR, URINE: YELLOW
GLUCOSE, URINE: NEGATIVE MG/DL
KETONES, URINE: NEGATIVE MG/DL
LEUKOCYTE ESTERASE, URINE: ABNORMAL
NITRITE, URINE: NEGATIVE
PH, URINE: 5 (ref 5–8)
PROTEIN, URINE: ABNORMAL MG/DL
RBC, URINE: 5 /HPF (ref 0–5)
SPECIFIC GRAVITY, URINE: 1.02 (ref 1–1.03)
UROBILINOGEN, URINE: <2 MG/DL (ref 0–1.9)
WBC CLUMPS, URINE: ABNORMAL /HPF
WBC, URINE: >182 /HPF (ref 0–5)

## 2023-08-22 PROCEDURE — 81001 URINALYSIS AUTO W/SCOPE: CPT

## 2023-08-25 ENCOUNTER — TELEPHONE (OUTPATIENT)
Dept: PRIMARY CARE | Facility: CLINIC | Age: 83
End: 2023-08-25
Payer: MEDICARE

## 2023-08-25 DIAGNOSIS — N39.0 URINARY TRACT INFECTION WITHOUT HEMATURIA, SITE UNSPECIFIED: Primary | ICD-10-CM

## 2023-08-25 RX ORDER — NITROFURANTOIN 25; 75 MG/1; MG/1
100 CAPSULE ORAL 2 TIMES DAILY
Qty: 14 CAPSULE | Refills: 0 | Status: SHIPPED | OUTPATIENT
Start: 2023-08-25 | End: 2023-09-05 | Stop reason: ALTCHOICE

## 2023-08-25 NOTE — TELEPHONE ENCOUNTER
PT's wife calling for spouse regarding results of urinalysis done 8/22, said he has been miserable all week with this

## 2023-08-26 NOTE — TELEPHONE ENCOUNTER
Can you please look into what happened here? I order Urine testing and it never resulted to me and is listed as unspecific provider and pt did not get results and has been miserable

## 2023-08-28 ENCOUNTER — LAB (OUTPATIENT)
Dept: LAB | Facility: LAB | Age: 83
End: 2023-08-28
Payer: MEDICARE

## 2023-08-28 DIAGNOSIS — I48.91 ATRIAL FIBRILLATION, UNSPECIFIED TYPE (MULTI): ICD-10-CM

## 2023-08-28 DIAGNOSIS — Z51.81 ENCOUNTER FOR THERAPEUTIC DRUG MONITORING: ICD-10-CM

## 2023-08-28 LAB
INR IN PPP BY COAGULATION ASSAY: 2.2 (ref 0.9–1.1)
PROTHROMBIN TIME (PT) IN PPP BY COAGULATION ASSAY: 25.1 SEC (ref 9.8–12.8)

## 2023-08-28 PROCEDURE — 85610 PROTHROMBIN TIME: CPT

## 2023-08-28 PROCEDURE — 36415 COLL VENOUS BLD VENIPUNCTURE: CPT

## 2023-09-01 ENCOUNTER — LAB (OUTPATIENT)
Dept: LAB | Facility: LAB | Age: 83
End: 2023-09-01
Payer: MEDICARE

## 2023-09-01 ENCOUNTER — TELEPHONE (OUTPATIENT)
Dept: PRIMARY CARE | Facility: CLINIC | Age: 83
End: 2023-09-01

## 2023-09-01 ENCOUNTER — APPOINTMENT (OUTPATIENT)
Dept: PRIMARY CARE | Facility: CLINIC | Age: 83
End: 2023-09-01
Payer: MEDICARE

## 2023-09-01 DIAGNOSIS — R30.0 DYSURIA: ICD-10-CM

## 2023-09-01 PROCEDURE — 87077 CULTURE AEROBIC IDENTIFY: CPT

## 2023-09-01 PROCEDURE — 81001 URINALYSIS AUTO W/SCOPE: CPT

## 2023-09-01 PROCEDURE — 87086 URINE CULTURE/COLONY COUNT: CPT

## 2023-09-01 PROCEDURE — 87186 SC STD MICRODIL/AGAR DIL: CPT

## 2023-09-01 NOTE — TELEPHONE ENCOUNTER
Wife called for Patient. He has a UTI. He has one antibiotic left and his urine is very cloudy and is having painful urination. Plz advise.

## 2023-09-02 LAB
APPEARANCE, URINE: ABNORMAL
BACTERIA, URINE: ABNORMAL /HPF
BILIRUBIN, URINE: NEGATIVE
BLOOD, URINE: ABNORMAL
COLOR, URINE: YELLOW
GLUCOSE, URINE: NEGATIVE MG/DL
KETONES, URINE: NEGATIVE MG/DL
LEUKOCYTE ESTERASE, URINE: ABNORMAL
MUCUS, URINE: ABNORMAL /LPF
NITRITE, URINE: POSITIVE
PH, URINE: 5 (ref 5–8)
PROTEIN, URINE: ABNORMAL MG/DL
RBC, URINE: 26 /HPF (ref 0–5)
SPECIFIC GRAVITY, URINE: 1.01 (ref 1–1.03)
UROBILINOGEN, URINE: <2 MG/DL (ref 0–1.9)
WBC CLUMPS, URINE: ABNORMAL /HPF
WBC, URINE: 761 /HPF (ref 0–5)

## 2023-09-04 LAB — URINE CULTURE: ABNORMAL

## 2023-09-05 DIAGNOSIS — N30.01 ACUTE CYSTITIS WITH HEMATURIA: ICD-10-CM

## 2023-09-05 DIAGNOSIS — N30.01 ACUTE CYSTITIS WITH HEMATURIA: Primary | ICD-10-CM

## 2023-09-05 RX ORDER — CIPROFLOXACIN 500 MG/1
500 TABLET ORAL 2 TIMES DAILY
Qty: 14 TABLET | Refills: 0 | Status: SHIPPED | OUTPATIENT
Start: 2023-09-05 | End: 2023-09-12

## 2023-09-05 NOTE — TELEPHONE ENCOUNTER
Patient does have a urologist. He see Dr. Cade Kellogg. He will call them to make an appt. He will recheck inr Friday AM    Please see pend med order from pharmacy

## 2023-09-06 RX ORDER — CIPROFLOXACIN 500 MG/1
500 TABLET ORAL 2 TIMES DAILY
Qty: 14 TABLET | Refills: 0 | OUTPATIENT
Start: 2023-09-06 | End: 2023-09-13

## 2023-09-08 ENCOUNTER — LAB (OUTPATIENT)
Dept: LAB | Facility: LAB | Age: 83
End: 2023-09-08
Payer: MEDICARE

## 2023-09-08 DIAGNOSIS — I48.91 ATRIAL FIBRILLATION, UNSPECIFIED TYPE (MULTI): ICD-10-CM

## 2023-09-08 DIAGNOSIS — Z51.81 ENCOUNTER FOR THERAPEUTIC DRUG MONITORING: ICD-10-CM

## 2023-09-08 PROBLEM — R33.9 INCOMPLETE EMPTYING OF BLADDER: Status: ACTIVE | Noted: 2023-09-08

## 2023-09-08 LAB
INR IN PPP BY COAGULATION ASSAY: 3.2 (ref 0.9–1.1)
PROTHROMBIN TIME (PT) IN PPP BY COAGULATION ASSAY: 37 SEC (ref 9.8–12.8)

## 2023-09-08 PROCEDURE — 36415 COLL VENOUS BLD VENIPUNCTURE: CPT

## 2023-09-08 PROCEDURE — 85610 PROTHROMBIN TIME: CPT

## 2023-09-08 RX ORDER — VIT C/E/ZN/COPPR/LUTEIN/ZEAXAN 250MG-90MG
CAPSULE ORAL
COMMUNITY

## 2023-09-11 NOTE — TELEPHONE ENCOUNTER
"Dr Parrish result    \"No change recheck 1 week- Thursday not Friday pplease \"      Talked with pt and verbalized understanding. He had no further questions  " Pain management and breastfeeding Continue breastfeeding.  Motrin as needed for pain.  Nothing per vagina x 6 weeks - no sex, tampons, douching, tub baths, etc.  Follow up in office in 5-6 weeks for check up

## 2023-09-14 ENCOUNTER — LAB (OUTPATIENT)
Dept: LAB | Facility: LAB | Age: 83
End: 2023-09-14
Payer: MEDICARE

## 2023-09-14 DIAGNOSIS — Z51.81 ENCOUNTER FOR THERAPEUTIC DRUG MONITORING: ICD-10-CM

## 2023-09-14 DIAGNOSIS — I48.91 ATRIAL FIBRILLATION, UNSPECIFIED TYPE (MULTI): ICD-10-CM

## 2023-09-14 LAB
INR IN PPP BY COAGULATION ASSAY: 2.1 (ref 0.9–1.1)
PROTHROMBIN TIME (PT) IN PPP BY COAGULATION ASSAY: 23.8 SEC (ref 9.8–12.8)

## 2023-09-14 PROCEDURE — 85610 PROTHROMBIN TIME: CPT

## 2023-09-14 PROCEDURE — 36415 COLL VENOUS BLD VENIPUNCTURE: CPT

## 2023-09-15 ENCOUNTER — TELEPHONE (OUTPATIENT)
Dept: PRIMARY CARE | Facility: CLINIC | Age: 83
End: 2023-09-15
Payer: MEDICARE

## 2023-09-20 ENCOUNTER — OFFICE VISIT (OUTPATIENT)
Dept: PRIMARY CARE | Facility: CLINIC | Age: 83
End: 2023-09-20
Payer: MEDICARE

## 2023-09-20 VITALS
OXYGEN SATURATION: 96 % | SYSTOLIC BLOOD PRESSURE: 140 MMHG | TEMPERATURE: 97.2 F | DIASTOLIC BLOOD PRESSURE: 70 MMHG | HEART RATE: 61 BPM | BODY MASS INDEX: 38.47 KG/M2 | WEIGHT: 274.8 LBS | HEIGHT: 71 IN

## 2023-09-20 DIAGNOSIS — E66.01 CLASS 2 SEVERE OBESITY DUE TO EXCESS CALORIES WITH SERIOUS COMORBIDITY AND BODY MASS INDEX (BMI) OF 38.0 TO 38.9 IN ADULT (MULTI): ICD-10-CM

## 2023-09-20 DIAGNOSIS — Z00.00 ROUTINE GENERAL MEDICAL EXAMINATION AT HEALTH CARE FACILITY: Primary | ICD-10-CM

## 2023-09-20 PROCEDURE — 1170F FXNL STATUS ASSESSED: CPT | Performed by: FAMILY MEDICINE

## 2023-09-20 PROCEDURE — G0439 PPPS, SUBSEQ VISIT: HCPCS | Performed by: FAMILY MEDICINE

## 2023-09-20 PROCEDURE — 1160F RVW MEDS BY RX/DR IN RCRD: CPT | Performed by: FAMILY MEDICINE

## 2023-09-20 PROCEDURE — 1159F MED LIST DOCD IN RCRD: CPT | Performed by: FAMILY MEDICINE

## 2023-09-20 PROCEDURE — 3078F DIAST BP <80 MM HG: CPT | Performed by: FAMILY MEDICINE

## 2023-09-20 PROCEDURE — 1036F TOBACCO NON-USER: CPT | Performed by: FAMILY MEDICINE

## 2023-09-20 PROCEDURE — 3077F SYST BP >= 140 MM HG: CPT | Performed by: FAMILY MEDICINE

## 2023-09-20 PROCEDURE — 99397 PER PM REEVAL EST PAT 65+ YR: CPT | Performed by: FAMILY MEDICINE

## 2023-09-20 RX ORDER — WARFARIN SODIUM 5 MG/1
5 TABLET ORAL
COMMUNITY

## 2023-09-20 ASSESSMENT — PATIENT HEALTH QUESTIONNAIRE - PHQ9
1. LITTLE INTEREST OR PLEASURE IN DOING THINGS: NOT AT ALL
2. FEELING DOWN, DEPRESSED OR HOPELESS: NOT AT ALL
SUM OF ALL RESPONSES TO PHQ9 QUESTIONS 1 AND 2: 0
SUM OF ALL RESPONSES TO PHQ9 QUESTIONS 1 AND 2: 0
2. FEELING DOWN, DEPRESSED OR HOPELESS: NOT AT ALL
1. LITTLE INTEREST OR PLEASURE IN DOING THINGS: NOT AT ALL

## 2023-09-20 ASSESSMENT — ENCOUNTER SYMPTOMS
SINUS PAIN: 0
SLEEP DISTURBANCE: 0
VOMITING: 0
COUGH: 0
DIFFICULTY URINATING: 0
BRUISES/BLEEDS EASILY: 1
ADENOPATHY: 0
PALPITATIONS: 0
NAUSEA: 0
ABDOMINAL PAIN: 0
HEADACHES: 0
EYE PAIN: 0
ARTHRALGIAS: 0
FEVER: 0
MYALGIAS: 0
SHORTNESS OF BREATH: 0
DYSPHORIC MOOD: 0
DIARRHEA: 0
ACTIVITY CHANGE: 0
WEAKNESS: 0
BLOOD IN STOOL: 0
APPETITE CHANGE: 0
NERVOUS/ANXIOUS: 0
CONSTIPATION: 0

## 2023-09-20 ASSESSMENT — ACTIVITIES OF DAILY LIVING (ADL)
MANAGING_FINANCES: INDEPENDENT
DRESSING: INDEPENDENT
DOING_HOUSEWORK: INDEPENDENT
GROCERY_SHOPPING: INDEPENDENT
BATHING: INDEPENDENT
TAKING_MEDICATION: INDEPENDENT

## 2023-09-20 NOTE — PROGRESS NOTES
Subjective   Reason for Visit: Cade Olivas is an 83 y.o. male here for a Medicare Wellness visit.     CARE TEAM  urology- Tirso Camacho follows for erectile dysfunction, BPH as well as prostate cancer status post radiation therapy in 2014. Now on active surveillance- for UTIs 3 in last 6 months- has appt in Nov   ortho- Silver -follows as needed for knee replacement 10/18  vascular-may thurner     Compression stockings are helping swelling      Taking all meds as directed    No new concerns  Declines flu vaccine     Past Medical, Surgical, and Family History reviewed and updated in chart.    Reviewed all medications by prescribing practitioner or clinical pharmacist (such as prescriptions, OTCs, herbal therapies and supplements) and documented in the medical record.    HPI    Patient Self Assessment of Health Status  Patient Self Assessment: Good    Nutrition and Exercise  Current Diet: Well Balanced Diet  Adequate Fluid Intake: Yes  Caffeine: Yes  Exercise Frequency: Infrequently    Functional Ability/Level of Safety  Cognitive Impairment Observed: No cognitive impairment observed  Cognitive Impairment Reported: No cognitive impairment reported by patient or family    Home Safety Risk Factors: None    Patient Care Team:  Ivan Parrish DO as PCP - General     Review of Systems   Constitutional:  Negative for activity change, appetite change and fever.   HENT:  Negative for congestion, ear pain and sinus pain.    Eyes:  Negative for pain and visual disturbance.   Respiratory:  Negative for cough and shortness of breath.    Cardiovascular:  Negative for chest pain, palpitations and leg swelling.   Gastrointestinal:  Negative for abdominal pain, blood in stool, constipation, diarrhea, nausea and vomiting.   Endocrine: Negative for cold intolerance and heat intolerance.   Genitourinary:  Negative for difficulty urinating.   Musculoskeletal:  Negative for arthralgias, gait problem and myalgias.   Skin:  Negative for  "rash.   Neurological:  Negative for weakness and headaches.   Hematological:  Negative for adenopathy. Bruises/bleeds easily.   Psychiatric/Behavioral:  Negative for dysphoric mood and sleep disturbance. The patient is not nervous/anxious.        Objective   Vitals:  /70   Pulse 61   Temp 36.2 °C (97.2 °F)   Ht 1.791 m (5' 10.5\")   Wt 125 kg (274 lb 12.8 oz)   SpO2 96%   BMI 38.87 kg/m²       Physical Exam  Vitals and nursing note reviewed.   Constitutional:       Appearance: Normal appearance.   HENT:      Head: Normocephalic and atraumatic.      Nose: Nose normal.      Mouth/Throat:      Mouth: Mucous membranes are moist.   Eyes:      Pupils: Pupils are equal, round, and reactive to light.   Cardiovascular:      Rate and Rhythm: Normal rate and regular rhythm.      Pulses: Normal pulses.      Heart sounds: Normal heart sounds.   Pulmonary:      Effort: Pulmonary effort is normal.      Breath sounds: Normal breath sounds.   Musculoskeletal:         General: No swelling. Normal range of motion.      Cervical back: Normal range of motion and neck supple.      Right lower leg: Edema (mild) present.      Left lower leg: Edema (mild) present.   Skin:     Capillary Refill: Capillary refill takes less than 2 seconds.      Findings: Bruising (on forearms) present.   Neurological:      General: No focal deficit present.      Mental Status: He is alert. Mental status is at baseline.   Psychiatric:         Mood and Affect: Mood normal.         Behavior: Behavior normal.         Thought Content: Thought content normal.         Judgment: Judgment normal.         Assessment/Plan   Problem List Items Addressed This Visit       Class 2 severe obesity with serious comorbidity and body mass index (BMI) of 38.0 to 38.9 in adult (CMS/[facility])     Other Visit Diagnoses       Routine general medical examination at health care facility    -  Primary        Controlled. Continue current medicines/regimen.   Cont with care team- " needs to see urology for utis + hx prostate ca  Cont with cardio and vascular  6 month fu for labs and med follow up

## 2023-09-21 LAB
INR IN PPP BY COAGULATION ASSAY: 2.1 (ref 0.9–1.1)
PROTHROMBIN TIME (PT) IN PPP BY COAGULATION ASSAY: 24 SEC (ref 9.8–12.8)

## 2023-09-28 ENCOUNTER — LAB (OUTPATIENT)
Dept: LAB | Facility: LAB | Age: 83
End: 2023-09-28
Payer: MEDICARE

## 2023-09-28 DIAGNOSIS — I48.91 ATRIAL FIBRILLATION, UNSPECIFIED TYPE (MULTI): ICD-10-CM

## 2023-09-28 DIAGNOSIS — Z51.81 ENCOUNTER FOR THERAPEUTIC DRUG MONITORING: ICD-10-CM

## 2023-09-28 LAB
INR IN PPP BY COAGULATION ASSAY: 2.7 (ref 0.9–1.1)
PROTHROMBIN TIME (PT) IN PPP BY COAGULATION ASSAY: 30.8 SEC (ref 9.8–12.8)

## 2023-09-28 PROCEDURE — 36415 COLL VENOUS BLD VENIPUNCTURE: CPT

## 2023-09-28 PROCEDURE — 85610 PROTHROMBIN TIME: CPT

## 2023-09-30 DIAGNOSIS — E11.69 TYPE 2 DIABETES MELLITUS WITH OTHER SPECIFIED COMPLICATION, WITHOUT LONG-TERM CURRENT USE OF INSULIN (MULTI): ICD-10-CM

## 2023-10-06 RX ORDER — GLIMEPIRIDE 2 MG/1
2 TABLET ORAL
Qty: 90 TABLET | Refills: 1 | Status: SHIPPED | OUTPATIENT
Start: 2023-10-06 | End: 2024-03-28

## 2023-10-12 ENCOUNTER — OFFICE VISIT (OUTPATIENT)
Dept: CARDIOLOGY | Facility: CLINIC | Age: 83
End: 2023-10-12
Payer: MEDICARE

## 2023-10-12 ENCOUNTER — LAB (OUTPATIENT)
Dept: LAB | Facility: LAB | Age: 83
End: 2023-10-12
Payer: MEDICARE

## 2023-10-12 VITALS
HEIGHT: 70 IN | BODY MASS INDEX: 39.58 KG/M2 | DIASTOLIC BLOOD PRESSURE: 56 MMHG | SYSTOLIC BLOOD PRESSURE: 112 MMHG | WEIGHT: 276.5 LBS | HEART RATE: 80 BPM

## 2023-10-12 DIAGNOSIS — I36.1 NONRHEUMATIC TRICUSPID VALVE REGURGITATION: ICD-10-CM

## 2023-10-12 DIAGNOSIS — I48.20 CHRONIC ATRIAL FIBRILLATION (MULTI): Primary | ICD-10-CM

## 2023-10-12 DIAGNOSIS — I10 BENIGN ESSENTIAL HYPERTENSION: ICD-10-CM

## 2023-10-12 DIAGNOSIS — E78.5 HYPERLIPIDEMIA, UNSPECIFIED HYPERLIPIDEMIA TYPE: ICD-10-CM

## 2023-10-12 DIAGNOSIS — R06.02 SOB (SHORTNESS OF BREATH) ON EXERTION: ICD-10-CM

## 2023-10-12 DIAGNOSIS — Z51.81 ENCOUNTER FOR THERAPEUTIC DRUG MONITORING: ICD-10-CM

## 2023-10-12 DIAGNOSIS — I48.91 ATRIAL FIBRILLATION, UNSPECIFIED TYPE (MULTI): ICD-10-CM

## 2023-10-12 LAB
INR PPP: 3.3 (ref 0.9–1.1)
PROTHROMBIN TIME: 37.8 SECONDS (ref 9.8–12.8)

## 2023-10-12 PROCEDURE — 85610 PROTHROMBIN TIME: CPT

## 2023-10-12 PROCEDURE — 99214 OFFICE O/P EST MOD 30 MIN: CPT | Performed by: NURSE PRACTITIONER

## 2023-10-12 PROCEDURE — 1160F RVW MEDS BY RX/DR IN RCRD: CPT | Performed by: NURSE PRACTITIONER

## 2023-10-12 PROCEDURE — 1159F MED LIST DOCD IN RCRD: CPT | Performed by: NURSE PRACTITIONER

## 2023-10-12 PROCEDURE — 3078F DIAST BP <80 MM HG: CPT | Performed by: NURSE PRACTITIONER

## 2023-10-12 PROCEDURE — 1036F TOBACCO NON-USER: CPT | Performed by: NURSE PRACTITIONER

## 2023-10-12 PROCEDURE — 36415 COLL VENOUS BLD VENIPUNCTURE: CPT

## 2023-10-12 PROCEDURE — 3074F SYST BP LT 130 MM HG: CPT | Performed by: NURSE PRACTITIONER

## 2023-10-12 NOTE — PROGRESS NOTES
Chief Complaint:   Chronic Atrial Fibrillation     History Of Present Illness:    Kaden Olivas is a 83 y.o. male here with Chronic Atrial Fibrillation.  The patient has been asymptomatic.  The patient has been rate-controlled in atrial fibrillation on medical treatment which they are tolerating well and are compliant.  The current treatment strategy is rate control.  The CHADS2-VASC2 score is 4  and the ACC/AHA guidelines recommend anticoagulation for stroke prophylaxis.  The patient is being treated with Coumadin and has tolerated treatment with no bleeding and is compliant.      Denies palpitations.   Denies dizziness and lightheadedness.    Has had 3 UTIS over the past several months      Allergies:  Patient has no known allergies.    Review of Systems   All other systems reviewed and are negative.      Last Labs:  CBC -  Lab Results   Component Value Date    WBC 6.5 08/18/2023    HGB 12.0 (L) 08/18/2023    HCT 37.2 (L) 08/18/2023    MCV 97 08/18/2023     08/18/2023       CMP -  Lab Results   Component Value Date    CALCIUM 8.9 08/18/2023    PHOS 2.9 01/24/2023    PROT 6.1 (L) 08/18/2023    ALBUMIN 3.8 08/18/2023    AST 13 08/18/2023    ALT 14 08/18/2023    ALKPHOS 64 08/18/2023    BILITOT 0.9 08/18/2023       LIPID PANEL -   Lab Results   Component Value Date    CHOL 112 10/26/2022    TRIG 88 10/26/2022    HDL 39.9 (A) 10/26/2022    CHHDL 2.8 10/26/2022    LDLF 55 10/26/2022    VLDL 18 10/26/2022       RENAL FUNCTION PANEL -   Lab Results   Component Value Date    GLUCOSE 103 (H) 08/18/2023     08/18/2023    K 4.6 08/18/2023     (H) 08/18/2023    CO2 27 08/18/2023    ANIONGAP 11 08/18/2023    BUN 15 08/18/2023    CREATININE 0.74 08/18/2023    GFRMALE 90 08/18/2023    CALCIUM 8.9 08/18/2023    PHOS 2.9 01/24/2023    ALBUMIN 3.8 08/18/2023        Lab Results   Component Value Date    HGBA1C 7.4 (A) 08/18/2023       Objective   Vitals reviewed.   Constitutional:       Appearance: Healthy  appearance. Not in distress.   Neck:      Vascular: No carotid bruit or JVR. JVD normal.   Pulmonary:      Effort: Pulmonary effort is normal.      Breath sounds: Normal breath sounds. No wheezing. No rhonchi. No rales.   Chest:      Chest wall: Not tender to palpatation.   Cardiovascular:      PMI at left midclavicular line. Normal rate. Irregularly irregular rhythm. Normal S1. Normal S2.       Murmurs: There is no murmur.      No gallop.  No click. No rub.   Edema:     Peripheral edema absent.   Abdominal:      General: Bowel sounds are normal.      Palpations: Abdomen is soft.      Tenderness: There is no abdominal tenderness.   Musculoskeletal:         General: No tenderness. Skin:     General: Skin is warm and dry.   Neurological:      General: No focal deficit present.      Mental Status: Alert and oriented to person, place and time.   Psychiatric:         Attention and Perception: Attention normal.         Mood and Affect: Mood normal.         Speech: Speech normal.       Assessment/Plan   Diagnoses and all orders for this visit:  Chronic atrial fibrillation (CMS/HCC)  - Rate controlled  - Asymptomatic  - Continue coumadin, INR 2-3  Benign essential hypertension  - well controlled  - reports feeling dizzy when standing  - monitor at home, call if low  Hyperlipidemia, unspecified hyperlipidemia type  - statin  Nonrheumatic tricuspid valve regurgitation  - moderate per previous echo  SOB (shortness of breath) on exertion  - stress test, last year, negative for ischemia     Follow up in 1 year      Current Outpatient Medications:     amoxicillin (Amoxil) 500 mg capsule, TAKE FOUR CAPSULES BY MOUTH ONE HOUR BEFORE APPOINTMENT, Disp: 4 capsule, Rfl: 1    cholecalciferol (Vitamin D-3) 50 mcg (2,000 unit) capsule, [facility] Vitamin D 2000 UNIT CAPS  Refills: 0     Active, Disp: , Rfl:     cyanocobalamin (Vitamin B-12) 100 mcg tablet, Take 1 tablet (100 mcg) by mouth once daily. As directed, Disp: , Rfl:     glimepiride  (Amaryl) 2 mg tablet, Take 1 tablet (2 mg) by mouth once daily in the morning. Take before meals., Disp: 90 tablet, Rfl: 1    lisinopril 10 mg tablet, Take 1 tablet (10 mg) by mouth once daily., Disp: , Rfl:     metFORMIN (Glucophage) 500 mg tablet, Take 2 tablets (1,000 mg) by mouth 2 times a day., Disp: , Rfl:     metoprolol tartrate (Lopressor) 50 mg tablet, Take 0.5 tablets by mouth 2 times a day., Disp: , Rfl:     simvastatin (Zocor) 20 mg tablet, Take 1 tablet (20 mg) by mouth once daily., Disp: , Rfl:     terazosin (Hytrin) 5 mg capsule, Take 1 capsule (5 mg) by mouth once daily., Disp: , Rfl:     vit C,W-Ha-wrdcc-lutein-zeaxan (PreserVision AREDS-2) 250-90-40-1 mg capsule, Take by mouth., Disp: , Rfl:     warfarin (Coumadin) 5 mg tablet, Take 1 tablet (5 mg) by mouth. Take 2 tab on Thrusday, Disp: , Rfl:     aspirin 81 mg EC tablet, Aspirin 81 MG TABS  Refills: 0     Active, Disp: , Rfl:     vitamins A,C,E-zinc-copper (PreserVision AREDS) 14,320-226-200 unit-mg-unit capsule, PreserVision AREDS 2 Oral Capsule  Refills: 0     Active, Disp: , Rfl:     warfarin (Coumadin) 7.5 mg tablet, TAKE 1 TABLET BY MOUTH ONCE DAILY AT BEDTIME. TAKE AS DIRECTED AFTER EACH INR. (Patient not taking: Reported on 9/20/2023), Disp: 90 tablet, Rfl: 0

## 2023-10-18 PROCEDURE — 85610 PROTHROMBIN TIME: CPT

## 2023-11-01 ENCOUNTER — LAB (OUTPATIENT)
Dept: LAB | Facility: LAB | Age: 83
End: 2023-11-01
Payer: MEDICARE

## 2023-11-01 DIAGNOSIS — Z51.81 ENCOUNTER FOR THERAPEUTIC DRUG MONITORING: ICD-10-CM

## 2023-11-01 DIAGNOSIS — I48.91 ATRIAL FIBRILLATION, UNSPECIFIED TYPE (MULTI): ICD-10-CM

## 2023-11-01 DIAGNOSIS — C61 MALIGNANT NEOPLASM OF PROSTATE (MULTI): Primary | ICD-10-CM

## 2023-11-01 LAB
INR PPP: 2.6 (ref 0.9–1.1)
PROTHROMBIN TIME: 30 SECONDS (ref 9.8–12.8)
PSA SERPL-MCNC: 2.74 NG/ML

## 2023-11-01 PROCEDURE — 36415 COLL VENOUS BLD VENIPUNCTURE: CPT

## 2023-11-01 PROCEDURE — 84153 ASSAY OF PSA TOTAL: CPT

## 2023-11-01 PROCEDURE — 85610 PROTHROMBIN TIME: CPT

## 2023-11-15 ENCOUNTER — LAB (OUTPATIENT)
Dept: LAB | Facility: LAB | Age: 83
End: 2023-11-15
Payer: MEDICARE

## 2023-11-15 DIAGNOSIS — Z51.81 ENCOUNTER FOR THERAPEUTIC DRUG MONITORING: ICD-10-CM

## 2023-11-15 DIAGNOSIS — I48.91 ATRIAL FIBRILLATION, UNSPECIFIED TYPE (MULTI): ICD-10-CM

## 2023-11-15 LAB
INR PPP: 3.1 (ref 0.9–1.1)
PROTHROMBIN TIME: 34.9 SECONDS (ref 9.8–12.8)

## 2023-11-15 PROCEDURE — 85610 PROTHROMBIN TIME: CPT

## 2023-11-15 PROCEDURE — 36415 COLL VENOUS BLD VENIPUNCTURE: CPT

## 2023-11-20 ENCOUNTER — APPOINTMENT (OUTPATIENT)
Dept: UROLOGY | Facility: CLINIC | Age: 83
End: 2023-11-20
Payer: MEDICARE

## 2023-11-22 ENCOUNTER — LAB (OUTPATIENT)
Dept: LAB | Facility: LAB | Age: 83
End: 2023-11-22
Payer: MEDICARE

## 2023-11-22 DIAGNOSIS — Z51.81 ENCOUNTER FOR THERAPEUTIC DRUG MONITORING: ICD-10-CM

## 2023-11-22 DIAGNOSIS — I48.91 ATRIAL FIBRILLATION, UNSPECIFIED TYPE (MULTI): ICD-10-CM

## 2023-11-22 LAB
INR PPP: 2.7 (ref 0.9–1.1)
PROTHROMBIN TIME: 31 SECONDS (ref 9.8–12.8)

## 2023-11-22 PROCEDURE — 36415 COLL VENOUS BLD VENIPUNCTURE: CPT

## 2023-11-22 PROCEDURE — 85610 PROTHROMBIN TIME: CPT

## 2023-11-29 PROCEDURE — 87186 SC STD MICRODIL/AGAR DIL: CPT

## 2023-11-29 PROCEDURE — 87086 URINE CULTURE/COLONY COUNT: CPT

## 2023-11-30 ENCOUNTER — LAB REQUISITION (OUTPATIENT)
Dept: LAB | Facility: HOSPITAL | Age: 83
End: 2023-11-30
Payer: MEDICARE

## 2023-11-30 DIAGNOSIS — N39.0 URINARY TRACT INFECTION, SITE NOT SPECIFIED: ICD-10-CM

## 2023-12-02 LAB — BACTERIA UR CULT: ABNORMAL

## 2023-12-04 ENCOUNTER — OFFICE VISIT (OUTPATIENT)
Dept: UROLOGY | Facility: CLINIC | Age: 83
End: 2023-12-04
Payer: MEDICARE

## 2023-12-04 DIAGNOSIS — R97.20 ELEVATED PSA: ICD-10-CM

## 2023-12-04 DIAGNOSIS — N40.0 BENIGN PROSTATIC HYPERPLASIA, UNSPECIFIED WHETHER LOWER URINARY TRACT SYMPTOMS PRESENT: ICD-10-CM

## 2023-12-04 DIAGNOSIS — C61 PROSTATE CANCER (MULTI): Primary | ICD-10-CM

## 2023-12-04 LAB
POC BILIRUBIN, URINE: ABNORMAL
POC BLOOD, URINE: NEGATIVE
POC GLUCOSE, URINE: NEGATIVE MG/DL
POC KETONES, URINE: ABNORMAL MG/DL
POC LEUKOCYTES, URINE: ABNORMAL
POC NITRITE,URINE: NEGATIVE
POC PH, URINE: 5.5 PH
POC PROTEIN, URINE: ABNORMAL MG/DL
POC SPECIFIC GRAVITY, URINE: 1.02
POC UROBILINOGEN, URINE: 1 EU/DL

## 2023-12-04 PROCEDURE — 1036F TOBACCO NON-USER: CPT | Performed by: UROLOGY

## 2023-12-04 PROCEDURE — 81003 URINALYSIS AUTO W/O SCOPE: CPT | Performed by: UROLOGY

## 2023-12-04 PROCEDURE — 99214 OFFICE O/P EST MOD 30 MIN: CPT | Performed by: UROLOGY

## 2023-12-04 PROCEDURE — 1159F MED LIST DOCD IN RCRD: CPT | Performed by: UROLOGY

## 2023-12-04 PROCEDURE — 1160F RVW MEDS BY RX/DR IN RCRD: CPT | Performed by: UROLOGY

## 2023-12-04 PROCEDURE — 87086 URINE CULTURE/COLONY COUNT: CPT

## 2023-12-04 RX ORDER — TERAZOSIN 5 MG/1
10 CAPSULE ORAL NIGHTLY
Qty: 30 CAPSULE | Refills: 0 | Status: SHIPPED | OUTPATIENT
Start: 2023-12-04 | End: 2023-12-21

## 2023-12-04 NOTE — PROGRESS NOTES
12/04/2023  Complaining UTI episodes, dysuria finished antibiotics.    Patient has no nausea, no vomiting, no fever.    NATI: Deferred    PSA rising    We discussed prostate cancer status post IMRT, rising PSA  We discussed the Lupron injection Casodex, indication risk-benefit and alternative  We discussed UTI urine culture  We discussed BPH, increased terazosin to 10 mg daily  All the questions were answered, the patient expressed understanding and agreed to the plan.    Impression  UTI  Dysuria  Prostate cancer [place] 7, status post IMRT  Rising PSA  BPH  ED     Plan  Lupron injection month for 2 years  Casodex 50 mg daily for 2 weeks, start 7 days before first Lupron  Urine culture  Change terazosin to 10 mg daily  Will see patient a first Lupron injection appointment    Chief Complaint   Patient presents with    Benign Prostatic Hypertrophy     Voiding well.     Prostate Cancer     Denies weight changes and bone pain.     Recurrent UTI     Patient claims he has had 5 UTIs since August.         Physical Exam     TODAYS LAB RESULTS:    PSA 11/01/2023  2.74  PSA 08/02/2023  2.31    POC Glucose, Urine  NEGATIVE mg/dl NEGATIVE    POC Bilirubin, Urine  NEGATIVE SMALL (1+) Abnormal     POC Ketones, Urine  NEGATIVE mg/dl TRACE Abnormal     POC Specific Gravity, Urine  1.005 - 1.035 1.025    POC Blood, Urine  NEGATIVE NEGATIVE    POC PH, Urine  No Reference Range Established PH 5.5    POC Protein, Urine  NEGATIVE, 30 (1+) mg/dl TRACE Abnormal     POC Urobilinogen, Urine  0.2, 1.0 EU/DL 1.0    Poc Nitrite, Urine  NEGATIVE NEGATIVE    POC Leukocytes, Urine  NEGATIVE TRACE Abnormal       ASSESSMENT&PLAN:      IMPRESSIONS:     05/19/2023  Voiding well, no weight loss no bone pain no poor appetite     Patient has no nausea, no vomiting, no fever.     NATI: Deferred     PSA slowly rising 1.81     Patient here for 6 month follow up for prostate Ca, BPH and ED   Last PSA done 04/27/23 1.81  Previous PSA done 02/01/23 1.69  He  does get 2-3 times a night. He does have frequency during the day. Complains of urgency ad weak stream. He does not feel that he empties bladder well.   PVR: 43ml --Argelia     IO Glucose - Urine Negative REQUIRED    IO Bilirubin Negative REQUIRED    IO Ketones Negative REQUIRED    IO Specific Gravity 1.020 REQUIRED    IO Blood Negative REQUIRED    IO pH 5.5 REQUIRED    IO Protein, Urine Negative REQUIRED    IO Urobilinogen Normal (0.2-1.0 mg/dl) REQUIRED    IO Nitrite, Urine Negative REQUIRED    IO Leukocytes Negative      We discussed the prostate cancer status post IMRT, rising PSA, continue monitoring PSA and possible Lupron injection if PSA rising above 2.0;  We discussed erectile dysfunction, failed medications, possible penile injection  All the questions were answered, the patient expressed understanding and agreed to the plan.     Impression  Prostate cancer [place] 7, status post IMRT  Rising PSA  BPH  ED     Plan  PSA in 3 months, 6 months  Appointment in 6 months  Possible Lupron injection in the future        11/18/2022  83-year-old gentleman history of prostate cancer [place] 7, status post IMRT, slowly rising PSA up to 1.56; also complained ED, failed medications; dysuria, on terazosin 5 mg daily     Patient has no nausea, no vomiting, no fever.     NATI: Deferred     PSA: 1.56     New patient here today to re-establish with urology for follow up on Prostate Cancer, ED, BPH, hx of radiation therapy. He previously was seeing Dr. Davina Camacho, last seen 5/6/22. [place] score 7 (4+3). He was to continue Terazosin 5mg daily.  PSA 10/26/22 1.56  PSA 5/2/22 1.25  Prostate biopsy done 4/16/14 per Dr. Jeff Casper---FINAL DIAGNOSIS  A. PROSTATE, RIGHT SIDE, BIOPSIES:  --ADENOCARCINOMA, [place]YAM SCORE 7 (4+3), INVOLVING APPROXIMATELY 50% OF THE  SPECIMEN WITH PERINEURAL INVASION  B. PROSTATE, LEFT SIDE, BIOPSIES:  --BENIGN PROSTATIC TISSUE     Patient denies dysuria, burning, hematuria. He has nocturia x2  depending on liquid intake.  -JMorgenstern CMA     IO Glucose - Urine Negative REQUIRED    IO Bilirubin Negative REQUIRED    IO Ketones Negative REQUIRED    IO Specific Gravity 1.025 REQUIRED    IO Blood Negative REQUIRED    IO pH 6.0 REQUIRED    IO Protein, Urine (+)30 REQUIRED    IO Urobilinogen 1 mg/dl REQUIRED    IO Nitrite, Urine Negative REQUIRED    IO Leukocytes Negative      We discussed the prostate cancer status post IMRT, rising PSA, continue monitoring PSA and possible Lupron injection if PSA rising above 2.0;  We discussed erectile dysfunction, failed medications, possible penile injection  We discussed benign prostate hypertrophy, possible PVP laser prostatectomy  All the questions were answered, the patient expressed understanding and agreed to the plan.     Impression  Prostate cancer [place] 7, status post IMRT  BPH  ED     Plan  PSA in 3 months, 6 months  Appointment in 6 months           5/6/2023 Davina Camacho  Prostate Cancer, clinical stage T2a NX M0, [place]  score 7 (4 + 3), initial PSA 6.0 mg/mL, group IIA  completed xrt between the dates of July 1, 2014,  and August 8, 2014  PSA is stable     ED, no chest pain or nitro use     recommend physical therapy with a manual vacuum erection device  1. to use the pump for physical therapy every other day-pump up, hold for 20-30 sec, then repeat 5-6 times. In 6-8 weeks of consistent use they will see results of increased length and girth  2. may use BRAD for intercourse with or without constriction ring  3. may use constriction ring alone if they are able to obtain the erection but cannot sustain the erection     Contraindicated in men on blood thinners or problems with bruising  Recommend purchasing at [facility] locations:  [address] [place] [id number] [phone]  Hours Mon-Sat 10 am-Midnight  Sun Noon-10pm     urinary frequency and nocturia  diabetic, may be causing overactivity of the bladder  on terazosin 5 mg daily, may increase dose  good  response to oxybutynin 10 mg ER daily. but had significant dry mouth and stopped this medication  denies glaucoma     return to clinic in 6 months with PSA prior           PSA   11/01/2023  2.74  08/02/2023  2.31  04/27/23 1.81  02/01/23 1.69  10/26/22 1.56   5/2/22 1.25        Surgery    July 1, 2014, IMRT

## 2023-12-05 ENCOUNTER — TELEPHONE (OUTPATIENT)
Dept: UROLOGY | Facility: CLINIC | Age: 83
End: 2023-12-05
Payer: MEDICARE

## 2023-12-05 DIAGNOSIS — C61 PROSTATE CANCER (MULTI): Primary | ICD-10-CM

## 2023-12-05 NOTE — TELEPHONE ENCOUNTER
Pt calling in stated we never sent over Casodex 50mg to the pharmacy for him to take 7days prior to his first lupron injection. Asking if we can send that other to the pharmacy.

## 2023-12-06 LAB — BACTERIA UR CULT: NO GROWTH

## 2023-12-07 RX ORDER — BICALUTAMIDE 50 MG/1
50 TABLET, FILM COATED ORAL DAILY
Qty: 14 TABLET | Refills: 25 | Status: SHIPPED | OUTPATIENT
Start: 2023-12-07 | End: 2024-04-19 | Stop reason: ALTCHOICE

## 2023-12-08 ENCOUNTER — APPOINTMENT (OUTPATIENT)
Dept: VASCULAR SURGERY | Facility: CLINIC | Age: 83
End: 2023-12-08
Payer: MEDICARE

## 2023-12-12 ENCOUNTER — OFFICE VISIT (OUTPATIENT)
Dept: VASCULAR SURGERY | Facility: CLINIC | Age: 83
End: 2023-12-12
Payer: MEDICARE

## 2023-12-12 VITALS
BODY MASS INDEX: 39.46 KG/M2 | HEART RATE: 76 BPM | SYSTOLIC BLOOD PRESSURE: 125 MMHG | WEIGHT: 275 LBS | DIASTOLIC BLOOD PRESSURE: 74 MMHG

## 2023-12-12 DIAGNOSIS — M79.89 LEG SWELLING: Primary | ICD-10-CM

## 2023-12-12 PROCEDURE — 3078F DIAST BP <80 MM HG: CPT | Performed by: INTERNAL MEDICINE

## 2023-12-12 PROCEDURE — 99213 OFFICE O/P EST LOW 20 MIN: CPT | Performed by: INTERNAL MEDICINE

## 2023-12-12 PROCEDURE — 3074F SYST BP LT 130 MM HG: CPT | Performed by: INTERNAL MEDICINE

## 2023-12-12 PROCEDURE — 1036F TOBACCO NON-USER: CPT | Performed by: INTERNAL MEDICINE

## 2023-12-12 PROCEDURE — 1159F MED LIST DOCD IN RCRD: CPT | Performed by: INTERNAL MEDICINE

## 2023-12-12 PROCEDURE — 1160F RVW MEDS BY RX/DR IN RCRD: CPT | Performed by: INTERNAL MEDICINE

## 2023-12-12 ASSESSMENT — ENCOUNTER SYMPTOMS
RESPIRATORY NEGATIVE: 1
HEMATOLOGIC/LYMPHATIC NEGATIVE: 1
PSYCHIATRIC NEGATIVE: 1
EYES NEGATIVE: 1
ALLERGIC/IMMUNOLOGIC NEGATIVE: 1
MUSCULOSKELETAL NEGATIVE: 1
GASTROINTESTINAL NEGATIVE: 1
CARDIOVASCULAR NEGATIVE: 1
ENDOCRINE NEGATIVE: 1
CONSTITUTIONAL NEGATIVE: 1
NEUROLOGICAL NEGATIVE: 1

## 2023-12-12 NOTE — PROGRESS NOTES
"Subjective   Patient ID: Cade Olivas \"Louise" is a 83 y.o. male who presents for Follow-up (6 mo).  HPI  83 year old male with a past medical history of anemia, afib (on warfarin), obesity, cellulitis, HLD and diabetes mellitus type 2 that presents to the office for a 6 month follow up for venous insufficiency. Overall, he is doing well. He states that the compression stockings is helping his leg symptoms. Upon exam he has no open sores or wounds. He has strong pedal pulses bilaterally. He denies claudication. He does complain of occasional sorness on his right calf while he is laying down, if he takes the pressure off, his symptoms are better    Vascular testing:  CT abd/pelvis with IV contrast 2/7/23: No findings of [place]. No abdominal aortic aneurysm  Venous insufficiency ultrasound 12/22: Deep venous reflux bilaterally. No evidence of DVT  PVR 12/22: No evidence of artery disease bilaterally   Venous ultrasound to rule out DVT in RLE 12/22: Negative for DVT    Review of Systems   Constitutional: Negative.    HENT: Negative.     Eyes: Negative.    Respiratory: Negative.     Cardiovascular: Negative.    Gastrointestinal: Negative.    Endocrine: Negative.    Genitourinary: Negative.    Musculoskeletal: Negative.    Skin: Negative.    Allergic/Immunologic: Negative.    Neurological: Negative.    Hematological: Negative.    Psychiatric/Behavioral: Negative.         Objective   Physical Exam  Constitutional:       Appearance: He is obese.   Eyes:      Pupils: Pupils are equal, round, and reactive to light.   Cardiovascular:      Rate and Rhythm: Normal rate.   Pulmonary:      Effort: Pulmonary effort is normal.   Abdominal:      General: Bowel sounds are normal.   Musculoskeletal:         General: Normal range of motion.      Cervical back: Normal range of motion.   Skin:     General: Skin is warm and dry.      Capillary Refill: Capillary refill takes less than 2 seconds.   Neurological:      General: No focal " deficit present.      Mental Status: He is alert.   Psychiatric:         Mood and Affect: Mood normal.         Assessment/Plan   83 year old male with a past medical history of anemia, afib (on warfarin), obesity, cellulitis, HLD and diabetes mellitus type 2 that presents to the office for a 6 month follow up for venous insufficiency.     Plan:    Continue conservative measures such as: compression stockings 20-30 mmHg, rest and elevation.  Call office if you develop worsening leg pain, swelling or a new ulcer/wound appears.  Follow up in one year           ROULA Sol 12/12/23 11:35 AM

## 2023-12-21 ENCOUNTER — OFFICE VISIT (OUTPATIENT)
Dept: UROLOGY | Facility: CLINIC | Age: 83
End: 2023-12-21
Payer: MEDICARE

## 2023-12-21 DIAGNOSIS — N40.0 BENIGN PROSTATIC HYPERPLASIA, UNSPECIFIED WHETHER LOWER URINARY TRACT SYMPTOMS PRESENT: Primary | ICD-10-CM

## 2023-12-21 DIAGNOSIS — C61 PROSTATE CANCER (MULTI): ICD-10-CM

## 2023-12-21 PROCEDURE — 1160F RVW MEDS BY RX/DR IN RCRD: CPT | Performed by: UROLOGY

## 2023-12-21 PROCEDURE — 1036F TOBACCO NON-USER: CPT | Performed by: UROLOGY

## 2023-12-21 PROCEDURE — 99213 OFFICE O/P EST LOW 20 MIN: CPT | Performed by: UROLOGY

## 2023-12-21 PROCEDURE — 1159F MED LIST DOCD IN RCRD: CPT | Performed by: UROLOGY

## 2023-12-21 NOTE — PROGRESS NOTES
12/21/2023  Patient here for first Lupron injection.  Started Casodex 7 days ago    Patient started Hytrin 10 mg daily, working better    We discussed prostate cancer status post IMRT, rising PSA  We discussed the Lupron injection Casodex, indication risk-benefit and alternative  We discussed UTI urine culture  We discussed BPH, increased terazosin to 10 mg daily  All the questions were answered, the patient expressed understanding and agreed to the plan.     Impression  UTI  Dysuria  Prostate cancer [place] 7, status post IMRT  Rising PSA  BPH  ED     Plan  Lupron injection today  Finish Casodex 50 mg daily for 2 weeks,   Liu continue terazosin to 10 mg daily  Will see patient at second Lupron injection appointment      Patient here today for Lupron injection. Lupron injection prepared per manufacturers instructions. Patient's skin prepped with alcohol and administered Lupron 45mg IM into LT dorsal glut per MELVIN Campos. Patient advised to report swelling, pain, and or any issues with injection site. Patient is to return in months for next injection.  Audelia Torres LPN        Chief Complaint   Patient presents with    Prostate Cancer     Patient here for first Lupron injection. Patient took Casodex 50 mg for 7 days before this appointment, as instructed.         Physical Exam     TODAYS LAB RESULTS:      ASSESSMENT&PLAN:      IMPRESSIONS:     12/04/2023  Complaining UTI episodes, dysuria finished antibiotics.     Patient has no nausea, no vomiting, no fever.     NATI: Deferred     PSA rising     We discussed prostate cancer status post IMRT, rising PSA  We discussed the Lupron injection Casodex, indication risk-benefit and alternative  We discussed UTI urine culture  We discussed BPH, increased terazosin to 10 mg daily  All the questions were answered, the patient expressed understanding and agreed to the plan.     Impression  UTI  Dysuria  Prostate cancer [place] 7, status post IMRT  Rising PSA  BPH  ED      Plan  Lupron injection month for 2 years  Casodex 50 mg daily for 2 weeks, start 7 days before first Lupron  Urine culture  Change terazosin to 10 mg daily  Will see patient a first Lupron injection appointment          Chief Complaint   Patient presents with    Benign Prostatic Hypertrophy       Voiding well.     Prostate Cancer       Denies weight changes and bone pain.     Recurrent UTI       Patient claims he has had 5 UTIs since August.          Physical Exam      TODAYS LAB RESULTS:     PSA 11/01/2023  2.74  PSA 08/02/2023  2.31     POC Glucose, Urine  NEGATIVE mg/dl NEGATIVE     POC Bilirubin, Urine  NEGATIVE SMALL (1+) Abnormal      POC Ketones, Urine  NEGATIVE mg/dl TRACE Abnormal      POC Specific Gravity, Urine  1.005 - 1.035 1.025     POC Blood, Urine  NEGATIVE NEGATIVE     POC PH, Urine  No Reference Range Established PH 5.5     POC Protein, Urine  NEGATIVE, 30 (1+) mg/dl TRACE Abnormal      POC Urobilinogen, Urine  0.2, 1.0 EU/DL 1.0     Poc Nitrite, Urine  NEGATIVE NEGATIVE     POC Leukocytes, Urine  NEGATIVE TRACE Abnormal         ASSESSMENT&PLAN:        IMPRESSIONS:     05/19/2023  Voiding well, no weight loss no bone pain no poor appetite     Patient has no nausea, no vomiting, no fever.     NATI: Deferred     PSA slowly rising 1.81     Patient here for 6 month follow up for prostate Ca, BPH and ED   Last PSA done 04/27/23 1.81  Previous PSA done 02/01/23 1.69  He does get 2-3 times a night. He does have frequency during the day. Complains of urgency ad weak stream. He does not feel that he empties bladder well.   PVR: 43ml --Argelia     IO Glucose - Urine Negative REQUIRED    IO Bilirubin Negative REQUIRED    IO Ketones Negative REQUIRED    IO Specific Gravity 1.020 REQUIRED    IO Blood Negative REQUIRED    IO pH 5.5 REQUIRED    IO Protein, Urine Negative REQUIRED    IO Urobilinogen Normal (0.2-1.0 mg/dl) REQUIRED    IO Nitrite, Urine Negative REQUIRED    IO Leukocytes Negative      We discussed  the prostate cancer status post IMRT, rising PSA, continue monitoring PSA and possible Lupron injection if PSA rising above 2.0;  We discussed erectile dysfunction, failed medications, possible penile injection  All the questions were answered, the patient expressed understanding and agreed to the plan.     Impression  Prostate cancer [place] 7, status post IMRT  Rising PSA  BPH  ED     Plan  PSA in 3 months, 6 months  Appointment in 6 months  Possible Lupron injection in the future        11/18/2022  83-year-old gentleman history of prostate cancer [place] 7, status post IMRT, slowly rising PSA up to 1.56; also complained ED, failed medications; dysuria, on terazosin 5 mg daily     Patient has no nausea, no vomiting, no fever.     NATI: Deferred     PSA: 1.56     New patient here today to re-establish with urology for follow up on Prostate Cancer, ED, BPH, hx of radiation therapy. He previously was seeing Dr. Davina Camacho, last seen 5/6/22. [place] score 7 (4+3). He was to continue Terazosin 5mg daily.  PSA 10/26/22 1.56  PSA 5/2/22 1.25  Prostate biopsy done 4/16/14 per Dr. Jeff Casper---FINAL DIAGNOSIS  A. PROSTATE, RIGHT SIDE, BIOPSIES:  --ADENOCARCINOMA, [place]IRYAM SCORE 7 (4+3), INVOLVING APPROXIMATELY 50% OF THE  SPECIMEN WITH PERINEURAL INVASION  B. PROSTATE, LEFT SIDE, BIOPSIES:  --BENIGN PROSTATIC TISSUE     Patient denies dysuria, burning, hematuria. He has nocturia x2 depending on liquid intake.  -JMorgenstern CMA     IO Glucose - Urine Negative REQUIRED    IO Bilirubin Negative REQUIRED    IO Ketones Negative REQUIRED    IO Specific Gravity 1.025 REQUIRED    IO Blood Negative REQUIRED    IO pH 6.0 REQUIRED    IO Protein, Urine (+)30 REQUIRED    IO Urobilinogen 1 mg/dl REQUIRED    IO Nitrite, Urine Negative REQUIRED    IO Leukocytes Negative      We discussed the prostate cancer status post IMRT, rising PSA, continue monitoring PSA and possible Lupron injection if PSA rising above 2.0;  We discussed  erectile dysfunction, failed medications, possible penile injection  We discussed benign prostate hypertrophy, possible PVP laser prostatectomy  All the questions were answered, the patient expressed understanding and agreed to the plan.     Impression  Prostate cancer [place] 7, status post IMRT  BPH  ED     Plan  PSA in 3 months, 6 months  Appointment in 6 months           5/6/2023 Davian Camacho  Prostate Cancer, clinical stage T2a NX M0, [place]  score 7 (4 + 3), initial PSA 6.0 mg/mL, group IIA  completed xrt between the dates of July 1, 2014,  and August 8, 2014  PSA is stable     ED, no chest pain or nitro use     recommend physical therapy with a manual vacuum erection device  1. to use the pump for physical therapy every other day-pump up, hold for 20-30 sec, then repeat 5-6 times. In 6-8 weeks of consistent use they will see results of increased length and girth  2. may use BRAD for intercourse with or without constriction ring  3. may use constriction ring alone if they are able to obtain the erection but cannot sustain the erection     Contraindicated in men on blood thinners or problems with bruising  Recommend purchasing at [facility] locations:  [address] [place] [id number] [phone]  Hours Mon-Sat 10 am-Midnight  Sun Noon-10pm     urinary frequency and nocturia  diabetic, may be causing overactivity of the bladder  on terazosin 5 mg daily, may increase dose  good response to oxybutynin 10 mg ER daily. but had significant dry mouth and stopped this medication  denies glaucoma     return to clinic in 6 months with PSA prior           PSA   11/01/2023  2.74  08/02/2023  2.31  04/27/23 1.81  02/01/23 1.69  10/26/22 1.56   5/2/22 1.25        Surgery    July 1, 2014, IMRT

## 2023-12-22 RX ORDER — TERAZOSIN 5 MG/1
10 CAPSULE ORAL NIGHTLY
Qty: 90 CAPSULE | Refills: 3 | Status: SHIPPED | OUTPATIENT
Start: 2023-12-22 | End: 2024-06-10

## 2023-12-28 ENCOUNTER — LAB (OUTPATIENT)
Dept: LAB | Facility: LAB | Age: 83
End: 2023-12-28
Payer: MEDICARE

## 2023-12-28 DIAGNOSIS — Z51.81 ENCOUNTER FOR THERAPEUTIC DRUG MONITORING: ICD-10-CM

## 2023-12-28 DIAGNOSIS — I48.91 ATRIAL FIBRILLATION, UNSPECIFIED TYPE (MULTI): ICD-10-CM

## 2023-12-28 LAB
INR PPP: 2.3 (ref 0.9–1.1)
PROTHROMBIN TIME: 26.3 SECONDS (ref 9.8–12.8)

## 2023-12-28 PROCEDURE — 85610 PROTHROMBIN TIME: CPT

## 2023-12-28 PROCEDURE — 36415 COLL VENOUS BLD VENIPUNCTURE: CPT

## 2024-01-02 DIAGNOSIS — I48.20 CHRONIC ATRIAL FIBRILLATION (MULTI): Primary | ICD-10-CM

## 2024-01-02 DIAGNOSIS — E11.69 TYPE 2 DIABETES MELLITUS WITH OTHER SPECIFIED COMPLICATION, WITHOUT LONG-TERM CURRENT USE OF INSULIN (MULTI): ICD-10-CM

## 2024-01-02 DIAGNOSIS — I10 BENIGN ESSENTIAL HYPERTENSION: ICD-10-CM

## 2024-01-02 RX ORDER — SIMVASTATIN 20 MG/1
20 TABLET, FILM COATED ORAL DAILY
Qty: 90 TABLET | Refills: 0 | Status: SHIPPED | OUTPATIENT
Start: 2024-01-02 | End: 2024-03-28

## 2024-01-02 RX ORDER — LISINOPRIL 10 MG/1
10 TABLET ORAL DAILY
Qty: 90 TABLET | Refills: 0 | Status: SHIPPED | OUTPATIENT
Start: 2024-01-02 | End: 2024-03-28

## 2024-01-02 RX ORDER — METFORMIN HYDROCHLORIDE 500 MG/1
1000 TABLET ORAL 2 TIMES DAILY
Qty: 360 TABLET | Refills: 0 | Status: SHIPPED | OUTPATIENT
Start: 2024-01-02 | End: 2024-03-28

## 2024-01-02 RX ORDER — WARFARIN 10 MG/1
10 TABLET ORAL
Qty: 90 TABLET | Refills: 0 | Status: SHIPPED | OUTPATIENT
Start: 2024-01-02 | End: 2024-03-28

## 2024-01-02 RX ORDER — METOPROLOL TARTRATE 50 MG/1
TABLET ORAL 2 TIMES DAILY
Qty: 90 TABLET | Refills: 0 | Status: SHIPPED | OUTPATIENT
Start: 2024-01-02 | End: 2024-03-28

## 2024-01-03 DIAGNOSIS — N40.0 BENIGN PROSTATIC HYPERPLASIA, UNSPECIFIED WHETHER LOWER URINARY TRACT SYMPTOMS PRESENT: ICD-10-CM

## 2024-01-03 RX ORDER — TERAZOSIN 5 MG/1
5 CAPSULE ORAL DAILY
Qty: 90 CAPSULE | Refills: 0 | OUTPATIENT
Start: 2024-01-03

## 2024-01-29 ENCOUNTER — OFFICE VISIT (OUTPATIENT)
Dept: VASCULAR SURGERY | Facility: CLINIC | Age: 84
End: 2024-01-29
Payer: MEDICARE

## 2024-01-29 VITALS
HEART RATE: 84 BPM | BODY MASS INDEX: 40.75 KG/M2 | DIASTOLIC BLOOD PRESSURE: 85 MMHG | WEIGHT: 284 LBS | SYSTOLIC BLOOD PRESSURE: 163 MMHG

## 2024-01-29 DIAGNOSIS — M79.89 LEG SWELLING: Primary | ICD-10-CM

## 2024-01-29 PROCEDURE — 99213 OFFICE O/P EST LOW 20 MIN: CPT | Performed by: INTERNAL MEDICINE

## 2024-01-29 PROCEDURE — 1036F TOBACCO NON-USER: CPT | Performed by: INTERNAL MEDICINE

## 2024-01-29 PROCEDURE — 1159F MED LIST DOCD IN RCRD: CPT | Performed by: INTERNAL MEDICINE

## 2024-01-29 PROCEDURE — 3079F DIAST BP 80-89 MM HG: CPT | Performed by: INTERNAL MEDICINE

## 2024-01-29 PROCEDURE — 3077F SYST BP >= 140 MM HG: CPT | Performed by: INTERNAL MEDICINE

## 2024-01-29 ASSESSMENT — ENCOUNTER SYMPTOMS
NEUROLOGICAL NEGATIVE: 1
GASTROINTESTINAL NEGATIVE: 1
MUSCULOSKELETAL NEGATIVE: 1
ALLERGIC/IMMUNOLOGIC NEGATIVE: 1
CONSTITUTIONAL NEGATIVE: 1
EYES NEGATIVE: 1
ENDOCRINE NEGATIVE: 1
PSYCHIATRIC NEGATIVE: 1
HEMATOLOGIC/LYMPHATIC NEGATIVE: 1
RESPIRATORY NEGATIVE: 1

## 2024-01-29 NOTE — PROGRESS NOTES
"Subjective   Patient ID: Cade Olivas \"Louise" is a 83 y.o. male who presents for No chief complaint on file..  HPI  83 year old male with a past medical history of anemia, afib (on warfarin), obesity, cellulitis, HLD and diabetes mellitus type 2 that presents to the office for leg swelling. He states that he went to the urgent care last week, was diagnosed with cellulitis. He completed a 7 day course of keflex, with some improvement. Redness has decreased. Denies fever. He does have right leg swelling. Upon exam he has no open sores or wounds. He has strong pedal pulses bilaterally. He denies claudication. He does wear compression stockings, with no relief. States that his right leg started to swell since the beginning of Jan.     Vascular testing:  CT abd/pelvis with IV contrast 2/7/23: No findings of May Garcia. No abdominal aortic aneurysm  Venous insufficiency ultrasound 12/22: Deep venous reflux bilaterally. No evidence of DVT  PVR 12/22: No evidence of artery disease bilaterally   Venous ultrasound to rule out DVT in RLE 12/22: Negative for DVT    Review of Systems   Constitutional: Negative.    HENT: Negative.     Eyes: Negative.    Respiratory: Negative.     Cardiovascular:  Positive for leg swelling.   Gastrointestinal: Negative.    Endocrine: Negative.    Genitourinary: Negative.    Musculoskeletal: Negative.    Skin: Negative.    Allergic/Immunologic: Negative.    Neurological: Negative.    Hematological: Negative.    Psychiatric/Behavioral: Negative.         Objective   Physical Exam  Constitutional:       Appearance: He is obese.   Eyes:      Pupils: Pupils are equal, round, and reactive to light.   Pulmonary:      Effort: Pulmonary effort is normal.   Abdominal:      General: Bowel sounds are normal.   Musculoskeletal:         General: Swelling present.      Cervical back: Normal range of motion.   Skin:     General: Skin is warm and dry.      Capillary Refill: Capillary refill takes less than 2 " seconds.   Neurological:      General: No focal deficit present.      Mental Status: He is alert.   Psychiatric:         Mood and Affect: Mood normal.         Assessment/Plan   83 year old male with a past medical history of anemia, afib (on warfarin), obesity, cellulitis, HLD and diabetes mellitus type 2 that presents to the office for leg swelling.    Plan:  He finished a 7 day treatment for cellulitis- Keflex. Redness has improved, denies fever  Will order a right leg venous insufficiency   Follow up with Dr. Gillespie after testing  Call office if you develop worsening leg pain, swelling or develop a new wound/ulcer           TRUDI Sol-CNP 01/29/24 9:53 AM

## 2024-02-07 ENCOUNTER — APPOINTMENT (OUTPATIENT)
Dept: PRIMARY CARE | Facility: CLINIC | Age: 84
End: 2024-02-07
Payer: MEDICARE

## 2024-02-09 ENCOUNTER — HOSPITAL ENCOUNTER (OUTPATIENT)
Dept: VASCULAR MEDICINE | Facility: HOSPITAL | Age: 84
Discharge: HOME | End: 2024-02-09
Payer: MEDICARE

## 2024-02-09 ENCOUNTER — LAB (OUTPATIENT)
Dept: LAB | Facility: LAB | Age: 84
End: 2024-02-09
Payer: MEDICARE

## 2024-02-09 DIAGNOSIS — I48.91 ATRIAL FIBRILLATION, UNSPECIFIED TYPE (MULTI): ICD-10-CM

## 2024-02-09 DIAGNOSIS — Z51.81 ENCOUNTER FOR THERAPEUTIC DRUG MONITORING: ICD-10-CM

## 2024-02-09 DIAGNOSIS — M79.89 LEG SWELLING: ICD-10-CM

## 2024-02-09 DIAGNOSIS — R60.0 LOCALIZED EDEMA: ICD-10-CM

## 2024-02-09 LAB
INR PPP: 3.9 (ref 0.9–1.1)
PROTHROMBIN TIME: 44.7 SECONDS (ref 9.8–12.8)

## 2024-02-09 PROCEDURE — 36415 COLL VENOUS BLD VENIPUNCTURE: CPT

## 2024-02-09 PROCEDURE — 85610 PROTHROMBIN TIME: CPT

## 2024-02-09 PROCEDURE — 93971 EXTREMITY STUDY: CPT | Performed by: INTERNAL MEDICINE

## 2024-02-09 PROCEDURE — 93971 EXTREMITY STUDY: CPT

## 2024-02-12 ENCOUNTER — TELEPHONE (OUTPATIENT)
Dept: PRIMARY CARE | Facility: CLINIC | Age: 84
End: 2024-02-12
Payer: MEDICARE

## 2024-02-12 NOTE — TELEPHONE ENCOUNTER
----- Message from Ivan Parrish DO sent at 2/12/2024  8:07 AM EST -----  Please call the patient regarding his abnormal result. Please make sure he got my [employer] message over the weekend about inr

## 2024-02-14 ENCOUNTER — LAB (OUTPATIENT)
Dept: LAB | Facility: LAB | Age: 84
End: 2024-02-14
Payer: MEDICARE

## 2024-02-14 DIAGNOSIS — I48.91 ATRIAL FIBRILLATION, UNSPECIFIED TYPE (MULTI): ICD-10-CM

## 2024-02-14 DIAGNOSIS — Z51.81 ENCOUNTER FOR THERAPEUTIC DRUG MONITORING: ICD-10-CM

## 2024-02-14 LAB
INR PPP: 1.4 (ref 0.9–1.1)
PROTHROMBIN TIME: 16 SECONDS (ref 9.8–12.8)

## 2024-02-14 PROCEDURE — 36415 COLL VENOUS BLD VENIPUNCTURE: CPT

## 2024-02-14 PROCEDURE — 85610 PROTHROMBIN TIME: CPT

## 2024-02-14 NOTE — TELEPHONE ENCOUNTER
Patient states he did hold a does but then forgot to take his next does. States he was taking 5mg everyday except for Thursday was 10mg. He's now going to take 5mg everyday and will recheck next week.

## 2024-02-14 NOTE — TELEPHONE ENCOUNTER
----- Message from Ivan Parrish DO sent at 2/14/2024  1:29 PM EST -----  Please call the patient regarding his abnormal result.  Please let patient know INR slightly low.  Please try to find out what changes he made since we never got a hold of him after high result

## 2024-02-19 ENCOUNTER — TELEPHONE (OUTPATIENT)
Dept: VASCULAR SURGERY | Facility: CLINIC | Age: 84
End: 2024-02-19
Payer: MEDICARE

## 2024-02-19 NOTE — TELEPHONE ENCOUNTER
Result Communication    Resulted Orders   Vascular US lower extremity venous insufficiency right    [facility]  [address] [id number]       Phone [phone] Fax [phone]       Vascular Lab Report     VASC US LOWER EXTREMITY VENOUS INSUFFICIENCY RIGHT    Patient Name:      SERGEY URENA     Reading Physician:  [id number] Bethanie Danielle MD, RPVI  Study Date:        2/9/2024             Ordering Provider:  [id number] WILBERTO KIM  MRN/PID:           [id number]             Fellow:  Accession#:        [id number]         Technologist:       Nancy Blackman RVT  Date of Birth/Age: 1940 / 83 years Technologist 2:  Gender:            M                    Encounter#:         [id number]  Admission Status:  Outpatient           Location Performed: [facility]       Diagnosis/ICD:    Localized (leg) edema-R60.0  CPT Codes:        65904 Venous reflux study VV VI Limited  Patient Position: Study performed in a reverse Trendelenburg position.       CONCLUSIONS:     Right Lower Venous Insufficiency: Reflux is noted in the saphenofemoral junction vein. There is reflux noted in the common femoral vein. No other deep or superficial reflux is noted.  The small saphenous vein arises proximal to the popliteal vein (Giacomini). No reflux is seen in the Vein of Giacomini.     Right Lower Venous: No evidence of acute deep vein thrombus visualized in the right lower extremity. Cannot rule out thrombus in non-visualized peroneal vein due to edema and body habitus. Limited visualization of calf vessels due to extensive edema.     Imaging & Doppler Findings:     Right          Compress Thrombus  Diam   Depth    Time  SFJ              Yes      None   7.1 mm 29.1 mm 1.30 sec  Prox Thigh GSV   Yes      None  Mid Thigh GSV    Yes      None  Knee GSV         Yes      None  Prox Calf GSV    Yes      None  Mid Calf GSV      Yes      None  Dist Calf GSV    Yes      None  SPJ              Yes      None  SSV Prox         Yes      None  SSV Mid          Yes      None  SSV Distal       Yes      None  Common FV                                       3.00 sec       Right                 Compressible Thrombus        Flow  Distal External Iliac                None  CFV                       Yes        None         Reflux  PFV                       Yes        None  FV Proximal               Yes        None   Spontaneous/Phasic  FV Mid                    Yes        None  FV Distal                 Yes        None  Popliteal                 Yes        None   Spontaneous/Phasic  Peroneal                  Yes        None  PTV                       Yes        None       [id number] Bethanie Danielle MD, RPVI  Electronically signed by [id number] Bethanie Danielle MD, RPELIZABETH on 2/9/2024 at 10:39:12 AM         ** Final **         11:33 AM      Results were not successfully communicated with the patient and they did not acknowledge their understanding.    Pt has an upcoming appt to discuss results and next steps. Overall, ultrasound is negative for DVT! Does show deep venous reflux.

## 2024-02-21 NOTE — PROGRESS NOTES
"Subjective   Patient ID: Cade Olivas \"Louise" is a 83 y.o. male who presents for No chief complaint on file..  HPI  83 year old male with a past medical history of anemia, afib (on warfarin), obesity, cellulitis, HLD and diabetes mellitus type 2 that presents to the office for a follow up for leg swelling/cellulitis and to discuss results. He had a recent venous insufficiency that does show deep venous reflux on the right, no evidence of a DVT. Today he complains of right leg pain and swelling. His redness has improved.    Per Last note \" He completed a 7 day course of keflex, with some improvement. Redness has decreased. Denies fever. He does have right leg swelling. Upon exam he has no open sores or wounds. He has strong pedal pulses bilaterally. He denies claudication. He does wear compression stockings, with no relief. States that his right leg started to swell since the beginning of Jan.     Vascular testing:  Venous insufficieny right leg 2/24: Right Lower Venous Insufficiency: Reflux is noted in the saphenofemoral junction vein. There is reflux noted in the common femoral vein. No other deep or superficial reflux is noted. The small saphenous vein arises proximal to the popliteal vein (Giacomini). No reflux is seen in the Vein of Giacomini. Right Lower Venous: No evidence of acute deep vein thrombus visualized in the right lower extremity.    CT abd/pelvis with IV contrast 2/7/23: No findings of May Garcia. No abdominal aortic aneurysm  Venous insufficiency ultrasound 12/22: Deep venous reflux bilaterally. No evidence of DVT  PVR 12/22: No evidence of artery disease bilaterally   Venous ultrasound to rule out DVT in RLE 12/22: Negative for DVT    Review of Systems   Constitutional: Negative.    HENT: Negative.     Eyes: Negative.    Respiratory: Negative.     Cardiovascular:  Positive for leg swelling.   Gastrointestinal: Negative.    Endocrine: Negative.    Genitourinary: Negative.    Musculoskeletal: " Negative.    Skin: Negative.    Allergic/Immunologic: Negative.    Neurological: Negative.    Hematological: Negative.    Psychiatric/Behavioral: Negative.         Objective   Physical Exam  Constitutional:       Appearance: He is obese.   Eyes:      Pupils: Pupils are equal, round, and reactive to light.   Pulmonary:      Effort: Pulmonary effort is normal.   Abdominal:      General: Bowel sounds are normal.   Musculoskeletal:         General: Swelling present.      Cervical back: Normal range of motion.   Skin:     General: Skin is warm and dry.      Capillary Refill: Capillary refill takes less than 2 seconds.   Neurological:      General: No focal deficit present.      Mental Status: He is alert.   Psychiatric:         Mood and Affect: Mood normal.         Assessment/Plan   83 year old male with a past medical history of anemia, afib (on warfarin), obesity, cellulitis, HLD and diabetes mellitus type 2 that presents to the office for a follow up for leg swelling/cellulitis and to discuss results. He had a recent venous insufficiency that does show deep venous reflux on the right, no evidence of a DVT.     Plan:  Right leg venous insufficiency 2/24: Does show deep venous reflux, no evidence of a DVT  CT abd last year- negative for May- Thurners  CEAP 5. Dicussed a venogram procedure with associated risks such as infection, bleeding and DVT. He will proceed with procedure.   Cellulitis has improved with a 7 day course of oral antibiotics   Continue conservative measures such as compression stockings 20 mmhg, rest and elevation in the interim   Call office if you develop worsening leg pain, swelling or develop a new wound/ulcer              TRUDI Sol-CNP 02/21/24 2:38 PM

## 2024-02-22 ENCOUNTER — LAB (OUTPATIENT)
Dept: LAB | Facility: LAB | Age: 84
End: 2024-02-22
Payer: MEDICARE

## 2024-02-22 ENCOUNTER — OFFICE VISIT (OUTPATIENT)
Dept: VASCULAR SURGERY | Facility: CLINIC | Age: 84
End: 2024-02-22
Payer: MEDICARE

## 2024-02-22 VITALS
DIASTOLIC BLOOD PRESSURE: 77 MMHG | SYSTOLIC BLOOD PRESSURE: 130 MMHG | WEIGHT: 281 LBS | BODY MASS INDEX: 40.32 KG/M2 | HEART RATE: 76 BPM

## 2024-02-22 DIAGNOSIS — I48.91 ATRIAL FIBRILLATION, UNSPECIFIED TYPE (MULTI): ICD-10-CM

## 2024-02-22 DIAGNOSIS — I87.2 VENOUS INSUFFICIENCY: Primary | ICD-10-CM

## 2024-02-22 DIAGNOSIS — Z51.81 ENCOUNTER FOR THERAPEUTIC DRUG MONITORING: ICD-10-CM

## 2024-02-22 LAB
INR PPP: 2.8 (ref 0.9–1.1)
PROTHROMBIN TIME: 31.9 SECONDS (ref 9.8–12.8)

## 2024-02-22 PROCEDURE — 1159F MED LIST DOCD IN RCRD: CPT | Performed by: SURGERY

## 2024-02-22 PROCEDURE — 3075F SYST BP GE 130 - 139MM HG: CPT | Performed by: SURGERY

## 2024-02-22 PROCEDURE — 3078F DIAST BP <80 MM HG: CPT | Performed by: SURGERY

## 2024-02-22 PROCEDURE — 1160F RVW MEDS BY RX/DR IN RCRD: CPT | Performed by: SURGERY

## 2024-02-22 PROCEDURE — 99213 OFFICE O/P EST LOW 20 MIN: CPT | Performed by: SURGERY

## 2024-02-22 PROCEDURE — 1036F TOBACCO NON-USER: CPT | Performed by: SURGERY

## 2024-02-22 PROCEDURE — 85610 PROTHROMBIN TIME: CPT

## 2024-02-22 PROCEDURE — 36415 COLL VENOUS BLD VENIPUNCTURE: CPT

## 2024-02-22 ASSESSMENT — ENCOUNTER SYMPTOMS
ALLERGIC/IMMUNOLOGIC NEGATIVE: 1
CONSTITUTIONAL NEGATIVE: 1
ENDOCRINE NEGATIVE: 1
GASTROINTESTINAL NEGATIVE: 1
MUSCULOSKELETAL NEGATIVE: 1
RESPIRATORY NEGATIVE: 1
HEMATOLOGIC/LYMPHATIC NEGATIVE: 1
NEUROLOGICAL NEGATIVE: 1
EYES NEGATIVE: 1
PSYCHIATRIC NEGATIVE: 1

## 2024-03-07 ENCOUNTER — TELEPHONE (OUTPATIENT)
Dept: PRIMARY CARE | Facility: CLINIC | Age: 84
End: 2024-03-07
Payer: MEDICARE

## 2024-03-07 NOTE — TELEPHONE ENCOUNTER
Patient states Dr Gillespie's office is waiting on approval from you before he can perform a venogram & have not heard from our office yet. Please advise

## 2024-03-08 ENCOUNTER — LAB (OUTPATIENT)
Dept: LAB | Facility: LAB | Age: 84
End: 2024-03-08
Payer: MEDICARE

## 2024-03-08 DIAGNOSIS — I48.20 CHRONIC ATRIAL FIBRILLATION (MULTI): ICD-10-CM

## 2024-03-08 DIAGNOSIS — Z51.81 ENCOUNTER FOR THERAPEUTIC DRUG MONITORING: ICD-10-CM

## 2024-03-08 DIAGNOSIS — E11.69 TYPE 2 DIABETES MELLITUS WITH OTHER SPECIFIED COMPLICATION, WITHOUT LONG-TERM CURRENT USE OF INSULIN (MULTI): ICD-10-CM

## 2024-03-08 DIAGNOSIS — I10 BENIGN ESSENTIAL HYPERTENSION: Primary | ICD-10-CM

## 2024-03-08 DIAGNOSIS — I48.91 ATRIAL FIBRILLATION, UNSPECIFIED TYPE (MULTI): ICD-10-CM

## 2024-03-08 LAB
INR PPP: 2 (ref 0.9–1.1)
PROTHROMBIN TIME: 22.7 SECONDS (ref 9.8–12.8)

## 2024-03-08 PROCEDURE — 36415 COLL VENOUS BLD VENIPUNCTURE: CPT

## 2024-03-08 PROCEDURE — 85610 PROTHROMBIN TIME: CPT

## 2024-03-11 ENCOUNTER — LAB (OUTPATIENT)
Dept: LAB | Facility: LAB | Age: 84
End: 2024-03-11
Payer: MEDICARE

## 2024-03-11 DIAGNOSIS — Z51.81 ENCOUNTER FOR THERAPEUTIC DRUG MONITORING: ICD-10-CM

## 2024-03-11 DIAGNOSIS — I48.20 CHRONIC ATRIAL FIBRILLATION (MULTI): ICD-10-CM

## 2024-03-11 DIAGNOSIS — E11.69 TYPE 2 DIABETES MELLITUS WITH OTHER SPECIFIED COMPLICATION, WITHOUT LONG-TERM CURRENT USE OF INSULIN (MULTI): ICD-10-CM

## 2024-03-11 DIAGNOSIS — I48.91 ATRIAL FIBRILLATION, UNSPECIFIED TYPE (MULTI): ICD-10-CM

## 2024-03-11 DIAGNOSIS — I10 BENIGN ESSENTIAL HYPERTENSION: ICD-10-CM

## 2024-03-11 LAB
ALBUMIN SERPL BCP-MCNC: 3.8 G/DL (ref 3.4–5)
ALP SERPL-CCNC: 63 U/L (ref 33–136)
ALT SERPL W P-5'-P-CCNC: 11 U/L (ref 10–52)
ANION GAP SERPL CALC-SCNC: 11 MMOL/L (ref 10–20)
AST SERPL W P-5'-P-CCNC: 13 U/L (ref 9–39)
BASOPHILS # BLD AUTO: 0.04 X10*3/UL (ref 0–0.1)
BASOPHILS NFR BLD AUTO: 0.6 %
BILIRUB SERPL-MCNC: 1 MG/DL (ref 0–1.2)
BUN SERPL-MCNC: 13 MG/DL (ref 6–23)
CALCIUM SERPL-MCNC: 8.9 MG/DL (ref 8.6–10.3)
CHLORIDE SERPL-SCNC: 107 MMOL/L (ref 98–107)
CHOLEST SERPL-MCNC: 99 MG/DL (ref 0–199)
CHOLESTEROL/HDL RATIO: 2.4
CO2 SERPL-SCNC: 28 MMOL/L (ref 21–32)
CREAT SERPL-MCNC: 0.83 MG/DL (ref 0.5–1.3)
EGFRCR SERPLBLD CKD-EPI 2021: 87 ML/MIN/1.73M*2
EOSINOPHIL # BLD AUTO: 0.12 X10*3/UL (ref 0–0.4)
EOSINOPHIL NFR BLD AUTO: 1.7 %
ERYTHROCYTE [DISTWIDTH] IN BLOOD BY AUTOMATED COUNT: 14.9 % (ref 11.5–14.5)
EST. AVERAGE GLUCOSE BLD GHB EST-MCNC: 194 MG/DL
GLUCOSE SERPL-MCNC: 93 MG/DL (ref 74–99)
HBA1C MFR BLD: 8.4 %
HCT VFR BLD AUTO: 36.8 % (ref 41–52)
HDLC SERPL-MCNC: 40.5 MG/DL
HGB BLD-MCNC: 11.8 G/DL (ref 13.5–17.5)
IMM GRANULOCYTES # BLD AUTO: 0.03 X10*3/UL (ref 0–0.5)
IMM GRANULOCYTES NFR BLD AUTO: 0.4 % (ref 0–0.9)
INR PPP: 1.9 (ref 0.9–1.1)
LDLC SERPL CALC-MCNC: 38 MG/DL
LYMPHOCYTES # BLD AUTO: 1.86 X10*3/UL (ref 0.8–3)
LYMPHOCYTES NFR BLD AUTO: 25.7 %
MCH RBC QN AUTO: 30.9 PG (ref 26–34)
MCHC RBC AUTO-ENTMCNC: 32.1 G/DL (ref 32–36)
MCV RBC AUTO: 96 FL (ref 80–100)
MONOCYTES # BLD AUTO: 0.77 X10*3/UL (ref 0.05–0.8)
MONOCYTES NFR BLD AUTO: 10.7 %
NEUTROPHILS # BLD AUTO: 4.41 X10*3/UL (ref 1.6–5.5)
NEUTROPHILS NFR BLD AUTO: 60.9 %
NON HDL CHOLESTEROL: 59 MG/DL (ref 0–149)
NRBC BLD-RTO: 0 /100 WBCS (ref 0–0)
PLATELET # BLD AUTO: 208 X10*3/UL (ref 150–450)
POTASSIUM SERPL-SCNC: 4.4 MMOL/L (ref 3.5–5.3)
PROT SERPL-MCNC: 6.1 G/DL (ref 6.4–8.2)
PROTHROMBIN TIME: 21.8 SECONDS (ref 9.8–12.8)
RBC # BLD AUTO: 3.82 X10*6/UL (ref 4.5–5.9)
SODIUM SERPL-SCNC: 142 MMOL/L (ref 136–145)
TRIGL SERPL-MCNC: 103 MG/DL (ref 0–149)
VLDL: 21 MG/DL (ref 0–40)
WBC # BLD AUTO: 7.2 X10*3/UL (ref 4.4–11.3)

## 2024-03-11 PROCEDURE — 80053 COMPREHEN METABOLIC PANEL: CPT

## 2024-03-11 PROCEDURE — 85610 PROTHROMBIN TIME: CPT

## 2024-03-11 PROCEDURE — 36415 COLL VENOUS BLD VENIPUNCTURE: CPT

## 2024-03-11 PROCEDURE — 80061 LIPID PANEL: CPT

## 2024-03-11 PROCEDURE — 83036 HEMOGLOBIN GLYCOSYLATED A1C: CPT

## 2024-03-11 PROCEDURE — 85025 COMPLETE CBC W/AUTO DIFF WBC: CPT

## 2024-03-13 ENCOUNTER — OFFICE VISIT (OUTPATIENT)
Dept: PRIMARY CARE | Facility: CLINIC | Age: 84
End: 2024-03-13
Payer: MEDICARE

## 2024-03-13 VITALS
DIASTOLIC BLOOD PRESSURE: 84 MMHG | BODY MASS INDEX: 40.43 KG/M2 | TEMPERATURE: 97.3 F | SYSTOLIC BLOOD PRESSURE: 140 MMHG | WEIGHT: 281.8 LBS

## 2024-03-13 DIAGNOSIS — E11.69 TYPE 2 DIABETES MELLITUS WITH OTHER SPECIFIED COMPLICATION, WITHOUT LONG-TERM CURRENT USE OF INSULIN (MULTI): ICD-10-CM

## 2024-03-13 DIAGNOSIS — I87.1 MAY-THURNER SYNDROME: ICD-10-CM

## 2024-03-13 DIAGNOSIS — C61 PROSTATE CANCER (MULTI): ICD-10-CM

## 2024-03-13 DIAGNOSIS — E66.01 CLASS 2 SEVERE OBESITY DUE TO EXCESS CALORIES WITH SERIOUS COMORBIDITY AND BODY MASS INDEX (BMI) OF 38.0 TO 38.9 IN ADULT (MULTI): ICD-10-CM

## 2024-03-13 DIAGNOSIS — G63 POLYNEUROPATHY ASSOCIATED WITH UNDERLYING DISEASE (MULTI): Primary | ICD-10-CM

## 2024-03-13 DIAGNOSIS — I48.20 CHRONIC ATRIAL FIBRILLATION (MULTI): ICD-10-CM

## 2024-03-13 PROCEDURE — 1036F TOBACCO NON-USER: CPT | Performed by: FAMILY MEDICINE

## 2024-03-13 PROCEDURE — 99214 OFFICE O/P EST MOD 30 MIN: CPT | Performed by: FAMILY MEDICINE

## 2024-03-13 PROCEDURE — 1160F RVW MEDS BY RX/DR IN RCRD: CPT | Performed by: FAMILY MEDICINE

## 2024-03-13 PROCEDURE — 1159F MED LIST DOCD IN RCRD: CPT | Performed by: FAMILY MEDICINE

## 2024-03-13 PROCEDURE — 3077F SYST BP >= 140 MM HG: CPT | Performed by: FAMILY MEDICINE

## 2024-03-13 PROCEDURE — 3079F DIAST BP 80-89 MM HG: CPT | Performed by: FAMILY MEDICINE

## 2024-03-13 ASSESSMENT — ENCOUNTER SYMPTOMS
ANAL BLEEDING: 0
BLOOD IN STOOL: 0
NERVOUS/ANXIOUS: 0
WEAKNESS: 0
ABDOMINAL PAIN: 0
DIFFICULTY URINATING: 0
ARTHRALGIAS: 0
PALPITATIONS: 0
COUGH: 0
UNEXPECTED WEIGHT CHANGE: 0
MYALGIAS: 0
HEADACHES: 0
NAUSEA: 0
SLEEP DISTURBANCE: 0
BRUISES/BLEEDS EASILY: 1
SINUS PAIN: 0
APPETITE CHANGE: 0
VOMITING: 0
EYE PAIN: 0
FEVER: 0
ACTIVITY CHANGE: 0
DYSPHORIC MOOD: 0
SHORTNESS OF BREATH: 0
CONSTIPATION: 0
HEMATURIA: 0
ADENOPATHY: 0
DIARRHEA: 0

## 2024-03-13 ASSESSMENT — PATIENT HEALTH QUESTIONNAIRE - PHQ9
1. LITTLE INTEREST OR PLEASURE IN DOING THINGS: NOT AT ALL
SUM OF ALL RESPONSES TO PHQ9 QUESTIONS 1 AND 2: 0
2. FEELING DOWN, DEPRESSED OR HOPELESS: NOT AT ALL

## 2024-03-13 NOTE — PROGRESS NOTES
"Subjective   Patient ID: Cade Olivas \"Jack\" is a 83 y.o. male who presents for Follow-up (Pre-op clearance).    HPI     Jack is an 82 yo male past medical history of atrial fibrillation that is rate controlled on metoprolol and anticoagulated with Coumadin, BPH, B12 deficiency, type 2 diabetes and prostate cancer s/p radiation 2014, may thurner     CARE TEAM  urology- Tirso Camacho follows for erectile dysfunction, BPH as well as prostate cancer status post radiation therapy in 2014. Now on active surveillance- for UTIs 3 in last 6 months- has appt in Nov   ortho- Silver -follows as needed for knee replacement 10/18  vascular-may thurner-     Swelling much worse since dec - R>L  Seen by Dr Gillespie- planning for venogram   No issues with anaesthesia in past   No increased SOB, no CP     Taking all meds as directed    Afib- taking coumadin as directed     In terms of his diabetes he has been on a sweets kick.  He knows he is addicted to sugar  He takes his medication as directed but really would like to not have to go on any other medication    Slight anemia for years - thought to be 2/2 coumadin   Not noticing blood in BM or urine       Review of Systems   Constitutional:  Negative for activity change, appetite change, fever and unexpected weight change.   HENT:  Negative for congestion, ear pain and sinus pain.    Eyes:  Negative for pain and visual disturbance.   Respiratory:  Negative for cough and shortness of breath.    Cardiovascular:  Positive for leg swelling. Negative for chest pain and palpitations.   Gastrointestinal:  Negative for abdominal pain, anal bleeding, blood in stool, constipation, diarrhea, nausea and vomiting.   Endocrine: Negative for cold intolerance and heat intolerance.   Genitourinary:  Negative for difficulty urinating and hematuria.   Musculoskeletal:  Negative for arthralgias, gait problem and myalgias.   Skin:  Negative for rash.   Neurological:  Negative for weakness and headaches. "   Hematological:  Negative for adenopathy. Bruises/bleeds easily.   Psychiatric/Behavioral:  Negative for dysphoric mood and sleep disturbance. The patient is not nervous/anxious.        Objective   /84   Temp 36.3 °C (97.3 °F)   Wt 128 kg (281 lb 12.8 oz)   BMI 40.43 kg/m²     Physical Exam  Vitals and nursing note reviewed.   Constitutional:       Appearance: Normal appearance.   HENT:      Head: Normocephalic and atraumatic.      Nose: Nose normal.      Mouth/Throat:      Mouth: Mucous membranes are moist.   Eyes:      Pupils: Pupils are equal, round, and reactive to light.   Cardiovascular:      Rate and Rhythm: Normal rate. Rhythm irregular.      Pulses: Normal pulses.      Heart sounds: Normal heart sounds.   Pulmonary:      Effort: Pulmonary effort is normal.      Breath sounds: Normal breath sounds.   Musculoskeletal:         General: No swelling. Normal range of motion.      Cervical back: Normal range of motion and neck supple.      Right lower leg: Edema (moderate) present.      Left lower leg: Edema (moderate) present.   Skin:     Capillary Refill: Capillary refill takes less than 2 seconds.      Findings: Bruising (on forearms) present.   Neurological:      General: No focal deficit present.      Mental Status: He is alert. Mental status is at baseline.      Gait: Gait normal.   Psychiatric:         Mood and Affect: Mood normal.         Behavior: Behavior normal.         Thought Content: Thought content normal.         Judgment: Judgment normal.       Lab Results   Component Value Date    HGBA1C 8.4 (H) 03/11/2024     Lab Results   Component Value Date    GLUCOSE 93 03/11/2024    CALCIUM 8.9 03/11/2024     03/11/2024    K 4.4 03/11/2024    CO2 28 03/11/2024     03/11/2024    BUN 13 03/11/2024    CREATININE 0.83 03/11/2024     Lab Results   Component Value Date    ALT 11 03/11/2024    AST 13 03/11/2024    ALKPHOS 63 03/11/2024    BILITOT 1.0 03/11/2024     Lab Results   Component  "Value Date    CHOL 99 03/11/2024    CHOL 112 10/26/2022    CHOL 115 09/22/2021     Lab Results   Component Value Date    HDL 40.5 03/11/2024    HDL 39.9 (A) 10/26/2022    HDL 43.2 09/22/2021     Lab Results   Component Value Date    LDLCALC 38 03/11/2024     Lab Results   Component Value Date    TRIG 103 03/11/2024    TRIG 88 10/26/2022    TRIG 91 09/22/2021     No components found for: \"CHOLHDL\"  Lab Results   Component Value Date    WBC 7.2 03/11/2024    HGB 11.8 (L) 03/11/2024    HCT 36.8 (L) 03/11/2024    MCV 96 03/11/2024     03/11/2024         Assessment/Plan   1. Polyneuropathy associated with underlying disease (CMS/HCC)  No acute cocnerns    2. Chronic atrial fibrillation (CMS/HCC)  Rate controlled and anticoagulated    3. Prostate cancer (CMS/HCC)  Following with urology on Lupron    4. Class 2 severe obesity due to excess calories with serious comorbidity and body mass index (BMI) of 38.0 to 38.9 in adult (CMS/[facility])  Discussed healthy diet and exercise.  Will plan to follow-up in 3 months unless concerns sooner    5. Type 2 diabetes mellitus with other specified complication, without long-term current use of insulin (CMS/[facility])  A1c significantly worsened in last 6 months.  Patient states he knows that this is dietary.  He declines assistance of any increases or different medications and wishes to work on it  Rather than check in 6 months we will recheck in 3  - Hemoglobin A1C; Future  6. Leg swelling   RCRI score 0 - DM2 not well controlled but not on insulin  Vignesh العراقي and they will manage coumadin     7.  Anemia-longstanding chronic thought to be secondary to long-term Coumadin use.  Patient asymptomatic. Hunter d/mert Parrish, DO    "

## 2024-03-19 ENCOUNTER — TELEPHONE (OUTPATIENT)
Dept: VASCULAR SURGERY | Facility: CLINIC | Age: 84
End: 2024-03-19
Payer: MEDICARE

## 2024-03-25 ENCOUNTER — PRE-ADMISSION TESTING (OUTPATIENT)
Dept: PREADMISSION TESTING | Facility: HOSPITAL | Age: 84
End: 2024-03-25
Payer: MEDICARE

## 2024-03-25 VITALS
SYSTOLIC BLOOD PRESSURE: 134 MMHG | DIASTOLIC BLOOD PRESSURE: 85 MMHG | TEMPERATURE: 97.6 F | RESPIRATION RATE: 22 BRPM | HEART RATE: 77 BPM

## 2024-03-25 DIAGNOSIS — E11.69 TYPE 2 DIABETES MELLITUS WITH OTHER SPECIFIED COMPLICATION, WITHOUT LONG-TERM CURRENT USE OF INSULIN (MULTI): ICD-10-CM

## 2024-03-25 DIAGNOSIS — Z01.818 ENCOUNTER FOR PREADMISSION TESTING: Primary | ICD-10-CM

## 2024-03-25 LAB
ANION GAP SERPL CALC-SCNC: 10 MMOL/L (ref 10–20)
BUN SERPL-MCNC: 15 MG/DL (ref 6–23)
CALCIUM SERPL-MCNC: 8.6 MG/DL (ref 8.6–10.3)
CHLORIDE SERPL-SCNC: 106 MMOL/L (ref 98–107)
CO2 SERPL-SCNC: 27 MMOL/L (ref 21–32)
CREAT SERPL-MCNC: 0.84 MG/DL (ref 0.5–1.3)
EGFRCR SERPLBLD CKD-EPI 2021: 87 ML/MIN/1.73M*2
ERYTHROCYTE [DISTWIDTH] IN BLOOD BY AUTOMATED COUNT: 14.8 % (ref 11.5–14.5)
EST. AVERAGE GLUCOSE BLD GHB EST-MCNC: 192 MG/DL
GLUCOSE SERPL-MCNC: 162 MG/DL (ref 74–99)
HBA1C MFR BLD: 8.3 %
HCT VFR BLD AUTO: 35.1 % (ref 41–52)
HGB BLD-MCNC: 11.8 G/DL (ref 13.5–17.5)
MCH RBC QN AUTO: 31.6 PG (ref 26–34)
MCHC RBC AUTO-ENTMCNC: 33.6 G/DL (ref 32–36)
MCV RBC AUTO: 94 FL (ref 80–100)
NRBC BLD-RTO: 0 /100 WBCS (ref 0–0)
PLATELET # BLD AUTO: 186 X10*3/UL (ref 150–450)
POTASSIUM SERPL-SCNC: 4.3 MMOL/L (ref 3.5–5.3)
RBC # BLD AUTO: 3.73 X10*6/UL (ref 4.5–5.9)
SODIUM SERPL-SCNC: 139 MMOL/L (ref 136–145)
WBC # BLD AUTO: 5.9 X10*3/UL (ref 4.4–11.3)

## 2024-03-25 PROCEDURE — 83036 HEMOGLOBIN GLYCOSYLATED A1C: CPT

## 2024-03-25 PROCEDURE — 85027 COMPLETE CBC AUTOMATED: CPT

## 2024-03-25 PROCEDURE — 80048 BASIC METABOLIC PNL TOTAL CA: CPT

## 2024-03-25 PROCEDURE — 36415 COLL VENOUS BLD VENIPUNCTURE: CPT

## 2024-03-25 PROCEDURE — 99203 OFFICE O/P NEW LOW 30 MIN: CPT | Performed by: NURSE PRACTITIONER

## 2024-03-25 ASSESSMENT — ENCOUNTER SYMPTOMS
PALPITATIONS: 1
NEUROLOGICAL NEGATIVE: 1
WHEEZING: 0
NECK NEGATIVE: 1
JOINT SWELLING: 1
DIFFICULTY URINATING: 0
FEVER: 0
COUGH: 0
EYES NEGATIVE: 1
DIARRHEA: 1
SHORTNESS OF BREATH: 1
CONSTITUTIONAL NEGATIVE: 1
ABDOMINAL DISTENTION: 0
CHILLS: 0
ABDOMINAL PAIN: 0

## 2024-03-25 ASSESSMENT — DUKE ACTIVITY SCORE INDEX (DASI)
CAN YOU DO MODERATE WORK AROUND THE HOUSE LIKE VACUUMING, SWEEPING FLOORS OR CARRYING GROCERIES: YES
CAN YOU PARTICIPATE IN MODERATE RECREATIONAL ACTIVITIES LIKE GOLF, BOWLING, DANCING, DOUBLES TENNIS OR THROWING A BASEBALL OR FOOTBALL: NO
CAN YOU DO HEAVY WORK AROUND THE HOUSE LIKE SCRUBBING FLOORS OR LIFTING AND MOVING HEAVY FURNITURE: NO
CAN YOU DO LIGHT WORK AROUND THE HOUSE LIKE DUSTING OR WASHING DISHES: YES
CAN YOU DO YARD WORK LIKE RAKING LEAVES, WEEDING OR PUSHING A MOWER: NO
CAN YOU WALK A BLOCK OR TWO ON LEVEL GROUND: NO
CAN YOU TAKE CARE OF YOURSELF (EAT, DRESS, BATHE, OR USE TOILET): YES
CAN YOU CLIMB A FLIGHT OF STAIRS OR WALK UP A HILL: NO
CAN YOU PARTICIPATE IN STRENOUS SPORTS LIKE SWIMMING, SINGLES TENNIS, FOOTBALL, BASKETBALL, OR SKIING: NO
CAN YOU RUN A SHORT DISTANCE: NO
CAN YOU WALK INDOORS, SUCH AS AROUND YOUR HOUSE: YES

## 2024-03-25 ASSESSMENT — CHADS2 SCORE
DIABETES: YES
PRIOR STROKE OR TIA OR THROMBOEMBOLISM: NO
AGE GREATER THAN OR EQUAL TO 75: YES
HYPERTENSION: YES
CHF: NO
AGE GREATER THAN OR EQUAL TO 75: YES
CHADS2 SCORE: 3

## 2024-03-25 ASSESSMENT — LIFESTYLE VARIABLES: SMOKING_STATUS: NONSMOKER

## 2024-03-25 NOTE — PREPROCEDURE INSTRUCTIONS
Medication List            Accurate as of March 25, 2024 10:14 AM. Always use your most recent med list.                amoxicillin 500 mg capsule  Commonly known as: Amoxil  TAKE FOUR CAPSULES BY MOUTH ONE HOUR BEFORE APPOINTMENT  Notes to patient: Only takes when having any dental procedure     bicalutamide 50 mg tablet  Commonly known as: Casodex  Take 1 tablet (50 mg total) by mouth once daily.  Take at the same time every day.  Medication Adjustments for Surgery: Continue until night before surgery     cholecalciferol 50 mcg (2,000 unit) capsule  Commonly known as: Vitamin D-3  Medication Adjustments for Surgery: Continue until night before surgery     cyanocobalamin 100 mcg tablet  Commonly known as: Vitamin B-12  Medication Adjustments for Surgery: Continue until night before surgery     glimepiride 2 mg tablet  Commonly known as: Amaryl  Take 1 tablet (2 mg) by mouth once daily in the morning. Take before meals.  Medication Adjustments for Surgery: Continue until night before surgery     lisinopril 10 mg tablet  Take 1 tablet by mouth once daily  Medication Adjustments for Surgery: Take morning of surgery with sip of water, no other fluids     metFORMIN 500 mg tablet  Commonly known as: Glucophage  Take 2 tablets by mouth twice daily  Medication Adjustments for Surgery: Continue until night before surgery     metoprolol tartrate 50 mg tablet  Commonly known as: Lopressor  Take 1/2 (one-half) tablet by mouth twice daily  Medication Adjustments for Surgery: Take morning of surgery with sip of water, no other fluids     PreserVision AREDS 4,296 mcg-226 mg-90 mg capsule  Generic drug: vitamins A,C,E-zinc-copper  Medication Adjustments for Surgery: Continue until night before surgery     PreserVision AREDS-2 250-90-40-1 mg capsule  Generic drug: vit C,F-Cx-rodsc-lutein-zeaxan  Medication Adjustments for Surgery: Continue until night before surgery     simvastatin 20 mg tablet  Commonly known as: Zocor  Take 1  tablet by mouth once daily  Medication Adjustments for Surgery: Continue until night before surgery     terazosin 5 mg capsule  Commonly known as: Hytrin  Take 2 capsules (10 mg) by mouth once daily at bedtime.  Medication Adjustments for Surgery: Continue until night before surgery     * warfarin 5 mg tablet  Commonly known as: Coumadin  Notes to patient: Will call prescriber to advise when to stop     * warfarin 7.5 mg tablet  Commonly known as: Coumadin  TAKE 1 TABLET BY MOUTH ONCE DAILY AT BEDTIME. TAKE AS DIRECTED AFTER EACH INR.     * warfarin 10 mg tablet  Commonly known as: Coumadin  Take 1 tablet by mouth once daily           * This list has 3 medication(s) that are the same as other medications prescribed for you. Read the directions carefully, and ask your doctor or other care provider to review them with you.                    The medication bridging plan has been discussed with the surgeon and the patient has been informed of the plan.              NPO Instructions:    Do not eat any food after midnight the night before your surgery/procedure.    Additional Instructions:     The Day before Surgery:  Review your medication instructions, stop indicated medications  ER[facility] patients, continue using your incentive spirometry   You will be contacted regarding the time of your arrival to facility and surgery time  Do not eat any food after Midnight  Go to admitting upon arrival to the hospital, bring a ID and insurance card.  No lotions or aftershave after shower.  Keep all valuables at home.   Wear comfortable clothing to wear home.  Make sure you have a  to take you home.  No uber or bus.  They will call you as to what time to arrive.  Just a sip of water with AM meds.

## 2024-03-25 NOTE — SIGNIFICANT EVENT
Patient is very short of breath after walking a short distance.. pulse ox was low 90's.  91% on arrival for pre op testing.

## 2024-03-25 NOTE — CPM/PAT H&P
"CPM/PAT Evaluation       Name: Cade Olivas (Cade Olivas \"Kaden\")  /Age: 1940/83 y.o.     In-Person       Chief Complaint: Pre-Admission Screening-PAT     HPI-  84 yo male presents for Pre-Admission testing for  Scheduled 24 B/L Lower Venogram        Past Medical History:   Diagnosis Date    Encounter for other preprocedural examination 2018    Pre-op evaluation    Other forms of dyspnea 2018    Dyspnea on exertion    Pain due to internal orthopedic prosthetic devices, implants and grafts, initial encounter (CMS/[facility]) 10/19/2018    Pain due to total right knee replacement    Pain in right knee 2018    Right knee pain    Pain in right knee 2018    Pain in both knees, unspecified chronicity    Pain in unspecified knee 2018    Knee pain    Personal history of other drug therapy     History of influenza vaccination    Personal history of other specified conditions 2019    History of urinary frequency    Personal history of urinary (tract) infections 2019    History of urinary tract infection    Personal history of urinary (tract) infections 2019    History of urinary tract infection       Past Surgical History:   Procedure Laterality Date    HERNIA REPAIR  10/30/2013    Hernia Repair    OTHER SURGICAL HISTORY  2022    Knee replacement       Patient  has no history on file for sexual activity.    Family History   Adopted: Yes   Problem Relation Name Age of Onset    No Known Problems Mother      Other (adopted) Other         No Known Allergies    Prior to Admission medications    Medication Sig Start Date End Date Taking? Authorizing Provider   amoxicillin (Amoxil) 500 mg capsule TAKE FOUR CAPSULES BY MOUTH ONE HOUR BEFORE APPOINTMENT 23   Ivan Parrish DO   bicalutamide (Casodex) 50 mg tablet Take 1 tablet (50 mg total) by mouth once daily.  Take at the same time every day. 23  Cade Kellogg MD   cholecalciferol (Vitamin D-3) 50 " mcg (2,000 unit) capsule [facility] Vitamin D 2000 UNIT CAPS   Refills: 0       Active    Historical Provider, MD   cyanocobalamin (Vitamin B-12) 100 mcg tablet Take 1 tablet (100 mcg) by mouth once daily. As directed 11/19/20   Historical Provider, MD   glimepiride (Amaryl) 2 mg tablet Take 1 tablet (2 mg) by mouth once daily in the morning. Take before meals. 10/6/23   Ivan Parrish DO   lisinopril 10 mg tablet Take 1 tablet by mouth once daily 1/2/24   Ivan Parrish, DO   metFORMIN (Glucophage) 500 mg tablet Take 2 tablets by mouth twice daily 1/2/24   Ivan Parrish, DO   metoprolol tartrate (Lopressor) 50 mg tablet Take 1/2 (one-half) tablet by mouth twice daily 1/2/24   Ivan Parrish, DO   simvastatin (Zocor) 20 mg tablet Take 1 tablet by mouth once daily 1/2/24   Ivan Parrish,    terazosin (Hytrin) 5 mg capsule Take 2 capsules (10 mg) by mouth once daily at bedtime. 12/22/23 6/19/24  Cade Kellogg MD   vit C,A-Mk-xzclb-lutein-zeaxan (PreserVision AREDS-2) 250-90-40-1 mg capsule Take by mouth.    Historical Provider, MD   vitamins A,C,E-zinc-copper (PreserVision AREDS) 14,320-226-200 unit-mg-unit capsule PreserVision AREDS 2 Oral Capsule   Refills: 0       Active    Historical Provider, MD   warfarin (Coumadin) 10 mg tablet Take 1 tablet by mouth once daily 1/2/24   Ivan Parrish DO   warfarin (Coumadin) 5 mg tablet Take 1 tablet (5 mg) by mouth. Take 2 tab on Thrusday    Historical Provider, MD   warfarin (Coumadin) 7.5 mg tablet TAKE 1 TABLET BY MOUTH ONCE DAILY AT BEDTIME. TAKE AS DIRECTED AFTER EACH INR. 7/7/23   Ivan Parrish DO        PAT ROS:   Constitutional:   neg     no fever   no chills  Neuro/Psych:   neg    Eyes:   neg    Ears:   neg    Nose:   neg    Mouth:   neg    Throat:   neg    Neck:   neg    Cardio:    no chest pain   palpitations  Respiratory:    no cough   no wheezing   shortness of breath  Endocrine:    cold intolerance  GI:    no abdominal distention   no abdominal pain   diarrhea  (daily since having radiation for Prostate)  :    no difficulty urinating  Musculoskeletal:    swelling (ble . Left < right)  Hematologic:    no history of blood transfusion   no blood clots  Skin:  neg        Physical Exam  Vitals and nursing note reviewed.   Constitutional:       Appearance: He is obese.   Cardiovascular:      Rate and Rhythm: Normal rate and regular rhythm.      Heart sounds: S1 normal and S2 normal.      Comments: Skin discoloration to bilateral lower legs.  Right leg with slight erythema noted   Pulmonary:      Effort: Tachypnea present. No respiratory distress.      Breath sounds: Normal breath sounds and air entry. No wheezing, rhonchi or rales.   Musculoskeletal:      Cervical back: Normal range of motion and neck supple.      Right lower le+ Pitting Edema present.      Left lower le+ Pitting Edema present.   Skin:     General: Skin is warm and dry.   Neurological:      Mental Status: He is alert.          PAT AIRWAY:   Airway:     Mallampati::  I    TM distance::  >3 FB    Neck ROM::  Full      There were no vitals taken for this visit.    DASI Risk Score    No data to display       Caprini DVT Assessment    No data to display       Modified Frailty Index    No data to display       CHADS2 Stroke Risk  Current as of 5 hours ago        5.9% 3 - 100%: High Risk   2 - 3%: Medium Risk   0 - 2%: Low Risk     Last Change: N/A          This score determines the patient's risk of having a stroke if the patient has atrial fibrillation.          Points Metrics   0 Has Congestive Heart Failure:  No     Patients with congestive heart failure get 1 point.    Current as of 5 hours ago   1 Has Hypertension:  Yes     Patients with hypertension get 1 point.    Current as of 5 hours ago   1 Age:  83     Patients who are 75 years of age or older get 1 point.    Current as of 5 hours ago   1 Has Diabetes:  Yes     Patients with diabetes get 1 point.    Current as of 5 hours ago   0 Had Stroke:  No   Had TIA:  No  Had Thromboembolism:  No     Patients who have had a stroke, TIA, or thromboembolism get 2 points.    Current as of 5 hours ago             Revised Cardiac Risk Index    No data to display       Apfel Simplified Score    No data to display       Risk Analysis Index Results This Encounter    No data found in the last 1 encounters.         Assessment and Plan:   CBC and BMP Completed  EKG completed   H&P Completed   Patient SOB on exertion with movements.  Nurse Eleonora states that patient was very SOB when walking into the office today.  Patient states that his PCP is aware of his SOB, and his PCP referred him to Cardiology.  Cardiology workup, but no abnormalities found, patient states.  Will Speak to MD Renae and possibly refer to Pulmonary for Clearance  3/19/24 Cardiac Clearance , A1C 8.4, H/O Afib  10/17/22 Echocardiogram completed   10/13/22 Stress Test Completed   Pre-Admission Screening Completed.  All questions addressed and answered.  Patient verbalized an understanding.

## 2024-03-27 DIAGNOSIS — E11.69 TYPE 2 DIABETES MELLITUS WITH OTHER SPECIFIED COMPLICATION, WITHOUT LONG-TERM CURRENT USE OF INSULIN (MULTI): ICD-10-CM

## 2024-03-27 DIAGNOSIS — I10 BENIGN ESSENTIAL HYPERTENSION: ICD-10-CM

## 2024-03-27 DIAGNOSIS — I48.20 CHRONIC ATRIAL FIBRILLATION (MULTI): ICD-10-CM

## 2024-03-28 RX ORDER — GLIMEPIRIDE 2 MG/1
2 TABLET ORAL
Qty: 90 TABLET | Refills: 0 | Status: SHIPPED | OUTPATIENT
Start: 2024-03-28

## 2024-03-28 RX ORDER — SIMVASTATIN 20 MG/1
20 TABLET, FILM COATED ORAL DAILY
Qty: 90 TABLET | Refills: 0 | Status: SHIPPED | OUTPATIENT
Start: 2024-03-28

## 2024-03-28 RX ORDER — LISINOPRIL 10 MG/1
10 TABLET ORAL DAILY
Qty: 90 TABLET | Refills: 0 | Status: SHIPPED | OUTPATIENT
Start: 2024-03-28

## 2024-03-28 RX ORDER — METFORMIN HYDROCHLORIDE 500 MG/1
1000 TABLET ORAL 2 TIMES DAILY
Qty: 360 TABLET | Refills: 0 | Status: SHIPPED | OUTPATIENT
Start: 2024-03-28

## 2024-03-28 RX ORDER — WARFARIN 10 MG/1
10 TABLET ORAL
Qty: 90 TABLET | Refills: 0 | Status: SHIPPED | OUTPATIENT
Start: 2024-03-28

## 2024-03-28 RX ORDER — METOPROLOL TARTRATE 50 MG/1
TABLET ORAL 2 TIMES DAILY
Qty: 90 TABLET | Refills: 0 | Status: SHIPPED | OUTPATIENT
Start: 2024-03-28

## 2024-04-05 ENCOUNTER — OFFICE VISIT (OUTPATIENT)
Dept: PRIMARY CARE | Facility: CLINIC | Age: 84
End: 2024-04-05
Payer: MEDICARE

## 2024-04-05 ENCOUNTER — LAB (OUTPATIENT)
Dept: LAB | Facility: LAB | Age: 84
End: 2024-04-05
Payer: MEDICARE

## 2024-04-05 VITALS
SYSTOLIC BLOOD PRESSURE: 123 MMHG | DIASTOLIC BLOOD PRESSURE: 77 MMHG | HEART RATE: 70 BPM | OXYGEN SATURATION: 96 % | WEIGHT: 274.6 LBS | TEMPERATURE: 97.6 F | BODY MASS INDEX: 39.4 KG/M2

## 2024-04-05 DIAGNOSIS — L03.115 CELLULITIS OF RIGHT LEG: ICD-10-CM

## 2024-04-05 DIAGNOSIS — L03.115 CELLULITIS OF RIGHT LEG: Primary | ICD-10-CM

## 2024-04-05 DIAGNOSIS — I48.20 CHRONIC ATRIAL FIBRILLATION (MULTI): ICD-10-CM

## 2024-04-05 LAB
BASOPHILS # BLD AUTO: 0.05 X10*3/UL (ref 0–0.1)
BASOPHILS NFR BLD AUTO: 0.4 %
EOSINOPHIL # BLD AUTO: 0.06 X10*3/UL (ref 0–0.4)
EOSINOPHIL NFR BLD AUTO: 0.4 %
ERYTHROCYTE [DISTWIDTH] IN BLOOD BY AUTOMATED COUNT: 14.9 % (ref 11.5–14.5)
HCT VFR BLD AUTO: 37.8 % (ref 41–52)
HGB BLD-MCNC: 11.9 G/DL (ref 13.5–17.5)
IMM GRANULOCYTES # BLD AUTO: 0.03 X10*3/UL (ref 0–0.5)
IMM GRANULOCYTES NFR BLD AUTO: 0.2 % (ref 0–0.9)
INR PPP: 2.2 (ref 0.9–1.1)
LYMPHOCYTES # BLD AUTO: 1.5 X10*3/UL (ref 0.8–3)
LYMPHOCYTES NFR BLD AUTO: 11 %
MCH RBC QN AUTO: 30.4 PG (ref 26–34)
MCHC RBC AUTO-ENTMCNC: 31.5 G/DL (ref 32–36)
MCV RBC AUTO: 97 FL (ref 80–100)
MONOCYTES # BLD AUTO: 1.7 X10*3/UL (ref 0.05–0.8)
MONOCYTES NFR BLD AUTO: 12.4 %
NEUTROPHILS # BLD AUTO: 10.34 X10*3/UL (ref 1.6–5.5)
NEUTROPHILS NFR BLD AUTO: 75.6 %
NRBC BLD-RTO: 0 /100 WBCS (ref 0–0)
PLATELET # BLD AUTO: 205 X10*3/UL (ref 150–450)
PROTHROMBIN TIME: 25.2 SECONDS (ref 9.8–12.8)
RBC # BLD AUTO: 3.91 X10*6/UL (ref 4.5–5.9)
WBC # BLD AUTO: 13.7 X10*3/UL (ref 4.4–11.3)

## 2024-04-05 PROCEDURE — 3078F DIAST BP <80 MM HG: CPT | Performed by: NURSE PRACTITIONER

## 2024-04-05 PROCEDURE — 1160F RVW MEDS BY RX/DR IN RCRD: CPT | Performed by: NURSE PRACTITIONER

## 2024-04-05 PROCEDURE — 85025 COMPLETE CBC W/AUTO DIFF WBC: CPT

## 2024-04-05 PROCEDURE — 99214 OFFICE O/P EST MOD 30 MIN: CPT | Performed by: NURSE PRACTITIONER

## 2024-04-05 PROCEDURE — 3074F SYST BP LT 130 MM HG: CPT | Performed by: NURSE PRACTITIONER

## 2024-04-05 PROCEDURE — 1159F MED LIST DOCD IN RCRD: CPT | Performed by: NURSE PRACTITIONER

## 2024-04-05 PROCEDURE — 1036F TOBACCO NON-USER: CPT | Performed by: NURSE PRACTITIONER

## 2024-04-05 PROCEDURE — 36415 COLL VENOUS BLD VENIPUNCTURE: CPT

## 2024-04-05 PROCEDURE — 85610 PROTHROMBIN TIME: CPT

## 2024-04-05 RX ORDER — DOXYCYCLINE 100 MG/1
100 CAPSULE ORAL 2 TIMES DAILY
Qty: 14 CAPSULE | Refills: 0 | Status: SHIPPED | OUTPATIENT
Start: 2024-04-05 | End: 2024-04-19 | Stop reason: ALTCHOICE

## 2024-04-05 RX ORDER — AMOXICILLIN AND CLAVULANATE POTASSIUM 875; 125 MG/1; MG/1
1 TABLET, FILM COATED ORAL 2 TIMES DAILY
Qty: 14 TABLET | Refills: 0 | Status: SHIPPED | OUTPATIENT
Start: 2024-04-05 | End: 2024-04-19 | Stop reason: ALTCHOICE

## 2024-04-05 RX ORDER — AMOXICILLIN AND CLAVULANATE POTASSIUM 875; 125 MG/1; MG/1
875 TABLET, FILM COATED ORAL 2 TIMES DAILY
Qty: 14 TABLET | Refills: 0 | Status: SHIPPED | OUTPATIENT
Start: 2024-04-05 | End: 2024-04-05

## 2024-04-05 ASSESSMENT — ENCOUNTER SYMPTOMS
COLOR CHANGE: 1
PALPITATIONS: 0
CHILLS: 1
OCCASIONAL FEELINGS OF UNSTEADINESS: 1
FATIGUE: 0
LOSS OF SENSATION IN FEET: 0
SHORTNESS OF BREATH: 1
DEPRESSION: 0
FEVER: 0

## 2024-04-05 NOTE — PROGRESS NOTES
"Toribio Olivas \"Louise" is a 83 y.o. male who presents for Leg Swelling (Blisters on back and front of leg/Dec leg swelled up has a venogram scheduled for next friday).    HPI  He presents to the office today along with his wife for evaluation of the right leg. Has had issues with chronic swelling since summer 2022. Is seeing vascular and having a venogram in one week .  Since 12/2023 has had an increase in swelling of the right leg.  He is on Coumadin for Atrial Fibrillation and has had a negative US RLE 2/9/2024.  The swelling is not any worse.  About a week ago noticed blisters to the back of the left leg which has since opened. He reports a clear yellowish drainage. Now has developed a blister in the front of the leg about 3-4 days ago.  Over the last 2 days has developed some redness in the front of the leg.  He has not noticed an increase in pain. Does note some pain to touch but not when up walking.  No fever noted.  (+) chills over past couple  months.   No night sweats.  Reports overall is feeling well other than the right leg.   No chest pain or heart palpitations.   (+) chronic SOB (at baseline)     Review of Systems   Constitutional:  Positive for chills. Negative for fatigue and fever.   Respiratory:  Positive for shortness of breath.    Cardiovascular:  Positive for leg swelling. Negative for chest pain and palpitations.   Skin:  Positive for color change.     Objective   /77 (BP Location: Left arm, Patient Position: Sitting, BP Cuff Size: Adult)   Pulse 70   Temp 36.4 °C (97.6 °F) (Temporal)   Wt 125 kg (274 lb 9.6 oz)   SpO2 96%   BMI 39.40 kg/m²     Physical Exam  Constitutional:       General: He is not in acute distress.     Appearance: Normal appearance. He is not toxic-appearing.   Cardiovascular:      Rate and Rhythm: Normal rate. Rhythm irregular.      Pulses: Normal pulses.      Heart sounds: Normal heart sounds, S1 normal and S2 normal. No murmur heard.  Pulmonary:    "   Effort: Pulmonary effort is normal.      Breath sounds: Normal breath sounds and air entry.   Musculoskeletal:      Right lower leg: 3+ Edema present.      Left lower le+ Edema present.   Lymphadenopathy:      Cervical: No cervical adenopathy.   Skin:            Comments: Right lower leg with quarter sized blister with purple and yellow discoloration. Drainage is serous. (+) surrounding erythema and warmth. (+)TTP.  (+) scattered open areas to right calf with serous drainage but no erythema.  No open areas to right foot.  Areas were cleansed with sterile water, bacitracin was applied and wrapped in Kerlix.   Neurological:      Mental Status: He is alert and oriented to person, place, and time.   Psychiatric:         Mood and Affect: Mood normal.         Behavior: Behavior normal.         Thought Content: Thought content normal.         Judgment: Judgment normal.         Assessment/Plan   Problem List Items Addressed This Visit       Atrial fibrillation (CMS/HCC)    Relevant Orders    Protime-INR (Completed)    Protime-INR    Cellulitis of right leg - Primary     Augmentin and doxycycline with food as directed today.  Check CBC with differential and PT/INR today.  Plan to follow-up for recheck on Monday with Dr. Smith.  Will also have a PT/INR rechecked on Monday.  Advised to the ER if symptoms should become worse.  He and his wife were agreeable with the plan and verbalized understanding.         Relevant Medications    doxycycline (Vibramycin) 100 mg capsule    Other Relevant Orders    Protime-INR (Completed)    CBC and Auto Differential (Completed)    Protime-INR       It has been a pleasure seeing you today!

## 2024-04-05 NOTE — PATIENT INSTRUCTIONS
Obtain the blood work as ordered today.  Cleanse the right leg with normal saline solution and pat dry once a day.  Apply bacitracin to blister and cover with a nonadherent pad then wrap with Kerlix.  Begin taking Augmentin as directed with food.  Also begin taking doxycycline as directed with food.  Please notify vascular about the leg infection as they likely will want to reschedule surgery next week.  Follow-up as planned with Dr. Smith on Monday.  To the ER with fever or chills, increased redness, warmth, or swelling.

## 2024-04-06 NOTE — ASSESSMENT & PLAN NOTE
Augmentin and doxycycline with food as directed today.  Check CBC with differential and PT/INR today.  Plan to follow-up for recheck on Monday with Dr. Smith.  Will also have a PT/INR rechecked on Monday.  Advised to the ER if symptoms should become worse.  He and his wife were agreeable with the plan and verbalized understanding.

## 2024-04-08 ENCOUNTER — OFFICE VISIT (OUTPATIENT)
Dept: PRIMARY CARE | Facility: CLINIC | Age: 84
End: 2024-04-08
Payer: MEDICARE

## 2024-04-08 ENCOUNTER — ANESTHESIA EVENT (OUTPATIENT)
Dept: OPERATING ROOM | Facility: HOSPITAL | Age: 84
End: 2024-04-08
Payer: MEDICARE

## 2024-04-08 VITALS
DIASTOLIC BLOOD PRESSURE: 70 MMHG | HEART RATE: 81 BPM | WEIGHT: 275.2 LBS | BODY MASS INDEX: 39.49 KG/M2 | OXYGEN SATURATION: 92 % | SYSTOLIC BLOOD PRESSURE: 102 MMHG | TEMPERATURE: 97.3 F

## 2024-04-08 DIAGNOSIS — E11.622 TYPE 2 DIABETES MELLITUS WITH OTHER SKIN ULCER, WITHOUT LONG-TERM CURRENT USE OF INSULIN (MULTI): ICD-10-CM

## 2024-04-08 DIAGNOSIS — I87.2 VENOUS INSUFFICIENCY: ICD-10-CM

## 2024-04-08 DIAGNOSIS — L03.115 CELLULITIS OF RIGHT LEG: Primary | ICD-10-CM

## 2024-04-08 PROCEDURE — 3078F DIAST BP <80 MM HG: CPT | Performed by: FAMILY MEDICINE

## 2024-04-08 PROCEDURE — 99213 OFFICE O/P EST LOW 20 MIN: CPT | Performed by: FAMILY MEDICINE

## 2024-04-08 PROCEDURE — 1159F MED LIST DOCD IN RCRD: CPT | Performed by: FAMILY MEDICINE

## 2024-04-08 PROCEDURE — 1036F TOBACCO NON-USER: CPT | Performed by: FAMILY MEDICINE

## 2024-04-08 PROCEDURE — 1160F RVW MEDS BY RX/DR IN RCRD: CPT | Performed by: FAMILY MEDICINE

## 2024-04-08 PROCEDURE — 3074F SYST BP LT 130 MM HG: CPT | Performed by: FAMILY MEDICINE

## 2024-04-08 ASSESSMENT — ENCOUNTER SYMPTOMS
OCCASIONAL FEELINGS OF UNSTEADINESS: 0
LOSS OF SENSATION IN FEET: 1
DEPRESSION: 0

## 2024-04-08 ASSESSMENT — PATIENT HEALTH QUESTIONNAIRE - PHQ9
SUM OF ALL RESPONSES TO PHQ9 QUESTIONS 1 AND 2: 0
1. LITTLE INTEREST OR PLEASURE IN DOING THINGS: NOT AT ALL
2. FEELING DOWN, DEPRESSED OR HOPELESS: NOT AT ALL

## 2024-04-08 NOTE — PROGRESS NOTES
Subjective   Patient ID: Kaden Olivas is a 83 y.o. male who presents for Wound Check.  Patient returns for follow-up on wound care.  He states not much is changed.  He is taking the antibiotics as prescribed.  It is still draining.  It is hard for him to keep his leg elevated due to the discomfort and pain when he applies pressure to the posterior aspect of the leg.  He typically sits in a reclining chair and he is unable to keep his leg elevated due to that pressure.  He has been keeping a bandage on his wound.  It is no longer draining purulent drainage.    Patient did have echocardiogram done in 2022 showed an EF of 50 to 55%, left atrium severely dilated, right atrium moderately dilated, moderate tricuspid regurg, small patent foramen ovale.  He does follow with cardiology and vascular surgery.  He was diagnosed with right leg venous insufficiency and had venogram scheduled for this Friday for dental follow-up.  Objective   /70 (BP Location: Left arm, Patient Position: Sitting, BP Cuff Size: Large adult)   Pulse 81   Temp 36.3 °C (97.3 °F) (Skin)   Wt 125 kg (275 lb 3.2 oz)   SpO2 92%   BMI 39.49 kg/m²     Physical Exam  Skin:     General: Skin is warm and moist.      Capillary Refill: Capillary refill takes 2 to 3 seconds.             Comments: Right lower leg with quarter sized blister with purple and yellow discoloration. Drainage is serous. (+) surrounding erythema and warmth. (+)TTP.  (+) scattered open areas to right calf with serous drainage but no erythema.  Neurovascularly intact         Assessment/Plan   Diagnoses and all orders for this visit:  Cellulitis of right leg  Venous insufficiency  Type 2 diabetes mellitus with other skin ulcer, without long-term current use of insulin (CMS/[facility])    Cade is an 83-year-old male presenting to the office for follow-up on imaging complicated by cellulitis in the setting of type 2 diabetes and venous insufficiency.  The wound is being treated with  dry bandaging intermittently will add another times a day open to air.  He has been treated for cellulitis and is currently on his course of antibiotics with amoxicillin and doxycycline.  Since Friday when he started therapy the wound did appear to open and has less purulence around the opening of the wound.  There is still surrounding erythema to the mid calf region.  We discussed that he needs to continue to have his leg elevated is much as he can I can tolerate the pain.  Patient due to prop up his leg.  He is to have his venogram on Friday of this week.  Will have him return to clinic next week.      Jayla Smith,      I spent a total of 15 minutes on the date of the service which included preparing to see the patient, face-to-face patient care, completing clinical documentation, performing a medically appropriate examination, counseling and educating the patient/family/caregiver and ordering medications, tests, or procedures.

## 2024-04-08 NOTE — H&P (VIEW-ONLY)
Subjective   Patient ID: Kaden Olivas is a 83 y.o. male who presents for Wound Check.  Patient returns for follow-up on wound care.  He states not much is changed.  He is taking the antibiotics as prescribed.  It is still draining.  It is hard for him to keep his leg elevated due to the discomfort and pain when he applies pressure to the posterior aspect of the leg.  He typically sits in a reclining chair and he is unable to keep his leg elevated due to that pressure.  He has been keeping a bandage on his wound.  It is no longer draining purulent drainage.    Patient did have echocardiogram done in 2022 showed an EF of 50 to 55%, left atrium severely dilated, right atrium moderately dilated, moderate tricuspid regurg, small patent foramen ovale.  He does follow with cardiology and vascular surgery.  He was diagnosed with right leg venous insufficiency and had venogram scheduled for this Friday for dental follow-up.  Objective   /70 (BP Location: Left arm, Patient Position: Sitting, BP Cuff Size: Large adult)   Pulse 81   Temp 36.3 °C (97.3 °F) (Skin)   Wt 125 kg (275 lb 3.2 oz)   SpO2 92%   BMI 39.49 kg/m²     Physical Exam  Skin:     General: Skin is warm and moist.      Capillary Refill: Capillary refill takes 2 to 3 seconds.             Comments: Right lower leg with quarter sized blister with purple and yellow discoloration. Drainage is serous. (+) surrounding erythema and warmth. (+)TTP.  (+) scattered open areas to right calf with serous drainage but no erythema.  Neurovascularly intact         Assessment/Plan   Diagnoses and all orders for this visit:  Cellulitis of right leg  Venous insufficiency  Type 2 diabetes mellitus with other skin ulcer, without long-term current use of insulin (CMS/[facility])    Cade is an 83-year-old male presenting to the office for follow-up on imaging complicated by cellulitis in the setting of type 2 diabetes and venous insufficiency.  The wound is being treated with  dry bandaging intermittently will add another times a day open to air.  He has been treated for cellulitis and is currently on his course of antibiotics with amoxicillin and doxycycline.  Since Friday when he started therapy the wound did appear to open and has less purulence around the opening of the wound.  There is still surrounding erythema to the mid calf region.  We discussed that he needs to continue to have his leg elevated is much as he can I can tolerate the pain.  Patient due to prop up his leg.  He is to have his venogram on Friday of this week.  Will have him return to clinic next week.      Jayla Smith,      I spent a total of 15 minutes on the date of the service which included preparing to see the patient, face-to-face patient care, completing clinical documentation, performing a medically appropriate examination, counseling and educating the patient/family/caregiver and ordering medications, tests, or procedures.

## 2024-04-10 ENCOUNTER — TELEPHONE (OUTPATIENT)
Dept: PRIMARY CARE | Facility: CLINIC | Age: 84
End: 2024-04-10

## 2024-04-10 ENCOUNTER — LAB (OUTPATIENT)
Dept: LAB | Facility: LAB | Age: 84
End: 2024-04-10
Payer: MEDICARE

## 2024-04-10 DIAGNOSIS — I48.20 CHRONIC ATRIAL FIBRILLATION (MULTI): ICD-10-CM

## 2024-04-10 DIAGNOSIS — L03.115 CELLULITIS OF RIGHT LEG: ICD-10-CM

## 2024-04-10 LAB
INR PPP: 1.6 (ref 0.9–1.1)
PROTHROMBIN TIME: 17.6 SECONDS (ref 9.8–12.8)

## 2024-04-10 PROCEDURE — 85610 PROTHROMBIN TIME: CPT

## 2024-04-10 PROCEDURE — 36415 COLL VENOUS BLD VENIPUNCTURE: CPT

## 2024-04-10 NOTE — TELEPHONE ENCOUNTER
Pt called back and said he has stopped taking Coudamin on Monday 04/08/24 because he has a procedure he's getting done on Friday 04/12/24 and was advised to stop taking Coudamin 5 days prior. He said he will get INR  done if you really want but it will probably be high due to stopping.

## 2024-04-10 NOTE — TELEPHONE ENCOUNTER
----- Message from ROULA Gillespie sent at 4/9/2024  4:58 PM EDT -----  Patient needed to have his INR rechecked on Monday when he was in for his office visit. Since he is on antibiotics we need to monitor closely. Please advise he have checked ASAP- the order is pending.

## 2024-04-11 NOTE — TELEPHONE ENCOUNTER
----- Message from ROULA Gillespie sent at 4/10/2024  4:44 PM EDT -----  Please let him know his INR is 1.6.  He should follow the instructions given to him by vascular regarding his Coumadin.

## 2024-04-11 NOTE — TELEPHONE ENCOUNTER
Talked with pt about his results and he verbalized understanding. He had no further questions at this time.

## 2024-04-12 ENCOUNTER — APPOINTMENT (OUTPATIENT)
Dept: RADIOLOGY | Facility: HOSPITAL | Age: 84
End: 2024-04-12
Payer: MEDICARE

## 2024-04-12 ENCOUNTER — ANESTHESIA (OUTPATIENT)
Dept: OPERATING ROOM | Facility: HOSPITAL | Age: 84
End: 2024-04-12
Payer: MEDICARE

## 2024-04-12 ENCOUNTER — HOSPITAL ENCOUNTER (OUTPATIENT)
Facility: HOSPITAL | Age: 84
Setting detail: OUTPATIENT SURGERY
Discharge: HOME | End: 2024-04-12
Attending: SURGERY | Admitting: SURGERY
Payer: MEDICARE

## 2024-04-12 ENCOUNTER — APPOINTMENT (OUTPATIENT)
Dept: CARDIOLOGY | Facility: HOSPITAL | Age: 84
End: 2024-04-12
Payer: MEDICARE

## 2024-04-12 VITALS
RESPIRATION RATE: 16 BRPM | SYSTOLIC BLOOD PRESSURE: 109 MMHG | TEMPERATURE: 98 F | HEART RATE: 76 BPM | OXYGEN SATURATION: 96 % | DIASTOLIC BLOOD PRESSURE: 89 MMHG

## 2024-04-12 DIAGNOSIS — I87.2 VENOUS INSUFFICIENCY: Primary | ICD-10-CM

## 2024-04-12 LAB
ATRIAL RATE: 0 BPM
GLUCOSE BLD MANUAL STRIP-MCNC: 135 MG/DL (ref 74–99)
INR PPP: 1.3 (ref 0.9–1.1)
PR INTERVAL: 184 MS
PROTHROMBIN TIME: 14.7 SECONDS (ref 9.8–12.8)
Q ONSET: 254 MS
QRS COUNT: 11 BEATS
QRS DURATION: 96 MS
QT INTERVAL: 467 MS
QTC CALCULATION(BAZETT): 522 MS
QTC FREDERICIA: 502 MS
R AXIS: 53 DEGREES
T AXIS: 18 DEGREES
T OFFSET: 487 MS
VENTRICULAR RATE: 75 BPM

## 2024-04-12 PROCEDURE — 76000 FLUOROSCOPY <1 HR PHYS/QHP: CPT

## 2024-04-12 PROCEDURE — 85610 PROTHROMBIN TIME: CPT | Performed by: ANESTHESIOLOGY

## 2024-04-12 PROCEDURE — 7100000001 HC RECOVERY ROOM TIME - INITIAL BASE CHARGE: Performed by: SURGERY

## 2024-04-12 PROCEDURE — 2550000001 HC RX 255 CONTRASTS: Performed by: SURGERY

## 2024-04-12 PROCEDURE — 93005 ELECTROCARDIOGRAM TRACING: CPT | Mod: 59

## 2024-04-12 PROCEDURE — 2500000004 HC RX 250 GENERAL PHARMACY W/ HCPCS (ALT 636 FOR OP/ED): Performed by: NURSE ANESTHETIST, CERTIFIED REGISTERED

## 2024-04-12 PROCEDURE — 7100000009 HC PHASE TWO TIME - INITIAL BASE CHARGE: Performed by: SURGERY

## 2024-04-12 PROCEDURE — 7100000010 HC PHASE TWO TIME - EACH INCREMENTAL 1 MINUTE: Performed by: SURGERY

## 2024-04-12 PROCEDURE — 2500000001 HC RX 250 WO HCPCS SELF ADMINISTERED DRUGS (ALT 637 FOR MEDICARE OP): Performed by: ANESTHESIOLOGY

## 2024-04-12 PROCEDURE — 36415 COLL VENOUS BLD VENIPUNCTURE: CPT | Performed by: ANESTHESIOLOGY

## 2024-04-12 PROCEDURE — C1769 GUIDE WIRE: HCPCS | Performed by: SURGERY

## 2024-04-12 PROCEDURE — 3700000001 HC GENERAL ANESTHESIA TIME - INITIAL BASE CHARGE: Performed by: SURGERY

## 2024-04-12 PROCEDURE — 36011 PLACE CATHETER IN VEIN: CPT | Performed by: SURGERY

## 2024-04-12 PROCEDURE — 76937 US GUIDE VASCULAR ACCESS: CPT | Performed by: SURGERY

## 2024-04-12 PROCEDURE — 93010 ELECTROCARDIOGRAM REPORT: CPT | Performed by: STUDENT IN AN ORGANIZED HEALTH CARE EDUCATION/TRAINING PROGRAM

## 2024-04-12 PROCEDURE — 2500000005 HC RX 250 GENERAL PHARMACY W/O HCPCS: Performed by: ANESTHESIOLOGY

## 2024-04-12 PROCEDURE — 2720000007 HC OR 272 NO HCPCS: Performed by: SURGERY

## 2024-04-12 PROCEDURE — 3700000002 HC GENERAL ANESTHESIA TIME - EACH INCREMENTAL 1 MINUTE: Performed by: SURGERY

## 2024-04-12 PROCEDURE — 7100000002 HC RECOVERY ROOM TIME - EACH INCREMENTAL 1 MINUTE: Performed by: SURGERY

## 2024-04-12 PROCEDURE — 2500000005 HC RX 250 GENERAL PHARMACY W/O HCPCS: Performed by: NURSE ANESTHETIST, CERTIFIED REGISTERED

## 2024-04-12 PROCEDURE — 3600000004 HC OR TIME - INITIAL BASE CHARGE - PROCEDURE LEVEL FOUR: Performed by: SURGERY

## 2024-04-12 PROCEDURE — C1753 CATH, INTRAVAS ULTRASOUND: HCPCS | Performed by: SURGERY

## 2024-04-12 PROCEDURE — 3600000009 HC OR TIME - EACH INCREMENTAL 1 MINUTE - PROCEDURE LEVEL FOUR: Performed by: SURGERY

## 2024-04-12 PROCEDURE — 82947 ASSAY GLUCOSE BLOOD QUANT: CPT

## 2024-04-12 PROCEDURE — 75822 VEIN X-RAY ARMS/LEGS: CPT | Performed by: SURGERY

## 2024-04-12 PROCEDURE — 2500000004 HC RX 250 GENERAL PHARMACY W/ HCPCS (ALT 636 FOR OP/ED): Performed by: ANESTHESIOLOGY

## 2024-04-12 PROCEDURE — 37252 INTRVASC US NONCORONARY 1ST: CPT | Performed by: SURGERY

## 2024-04-12 PROCEDURE — 37253 INTRVASC US NONCORONARY ADDL: CPT | Performed by: SURGERY

## 2024-04-12 RX ORDER — MORPHINE SULFATE 2 MG/ML
2 INJECTION, SOLUTION INTRAMUSCULAR; INTRAVENOUS EVERY 5 MIN PRN
Status: DISCONTINUED | OUTPATIENT
Start: 2024-04-12 | End: 2024-04-12 | Stop reason: HOSPADM

## 2024-04-12 RX ORDER — FENTANYL CITRATE 50 UG/ML
INJECTION, SOLUTION INTRAMUSCULAR; INTRAVENOUS AS NEEDED
Status: DISCONTINUED | OUTPATIENT
Start: 2024-04-12 | End: 2024-04-12

## 2024-04-12 RX ORDER — SODIUM CHLORIDE 9 MG/ML
50 INJECTION, SOLUTION INTRAVENOUS CONTINUOUS
Status: DISCONTINUED | OUTPATIENT
Start: 2024-04-12 | End: 2024-04-12 | Stop reason: HOSPADM

## 2024-04-12 RX ORDER — ACETAMINOPHEN 325 MG/1
975 TABLET ORAL ONCE
Status: COMPLETED | OUTPATIENT
Start: 2024-04-12 | End: 2024-04-12

## 2024-04-12 RX ORDER — SODIUM CITRATE AND CITRIC ACID MONOHYDRATE 334; 500 MG/5ML; MG/5ML
30 SOLUTION ORAL ONCE
Status: COMPLETED | OUTPATIENT
Start: 2024-04-12 | End: 2024-04-12

## 2024-04-12 RX ORDER — CEFAZOLIN SODIUM 2 G/100ML
INJECTION, SOLUTION INTRAVENOUS AS NEEDED
Status: DISCONTINUED | OUTPATIENT
Start: 2024-04-12 | End: 2024-04-12

## 2024-04-12 RX ORDER — ALBUTEROL SULFATE 0.83 MG/ML
2.5 SOLUTION RESPIRATORY (INHALATION) ONCE
Status: DISCONTINUED | OUTPATIENT
Start: 2024-04-12 | End: 2024-04-12 | Stop reason: HOSPADM

## 2024-04-12 RX ORDER — MORPHINE SULFATE 2 MG/ML
1 INJECTION, SOLUTION INTRAMUSCULAR; INTRAVENOUS EVERY 5 MIN PRN
Status: DISCONTINUED | OUTPATIENT
Start: 2024-04-12 | End: 2024-04-12 | Stop reason: HOSPADM

## 2024-04-12 RX ORDER — PROPOFOL 10 MG/ML
INJECTION, EMULSION INTRAVENOUS CONTINUOUS PRN
Status: DISCONTINUED | OUTPATIENT
Start: 2024-04-12 | End: 2024-04-12

## 2024-04-12 RX ORDER — LIDOCAINE HCL/PF 100 MG/5ML
SYRINGE (ML) INTRAVENOUS AS NEEDED
Status: DISCONTINUED | OUTPATIENT
Start: 2024-04-12 | End: 2024-04-12

## 2024-04-12 RX ORDER — ONDANSETRON HYDROCHLORIDE 2 MG/ML
4 INJECTION, SOLUTION INTRAVENOUS ONCE
Status: COMPLETED | OUTPATIENT
Start: 2024-04-12 | End: 2024-04-12

## 2024-04-12 RX ORDER — METOCLOPRAMIDE HYDROCHLORIDE 5 MG/ML
10 INJECTION INTRAMUSCULAR; INTRAVENOUS ONCE
Status: COMPLETED | OUTPATIENT
Start: 2024-04-12 | End: 2024-04-12

## 2024-04-12 RX ORDER — IODIXANOL 270 MG/ML
INJECTION, SOLUTION INTRAVASCULAR AS NEEDED
Status: DISCONTINUED | OUTPATIENT
Start: 2024-04-12 | End: 2024-04-12 | Stop reason: HOSPADM

## 2024-04-12 RX ORDER — FAMOTIDINE 10 MG/ML
20 INJECTION INTRAVENOUS ONCE
Status: COMPLETED | OUTPATIENT
Start: 2024-04-12 | End: 2024-04-12

## 2024-04-12 RX ADMIN — PROPOFOL 200 MCG/KG/MIN: 10 INJECTION, EMULSION INTRAVENOUS at 09:00

## 2024-04-12 RX ADMIN — METOCLOPRAMIDE 10 MG: 5 INJECTION, SOLUTION INTRAMUSCULAR; INTRAVENOUS at 08:39

## 2024-04-12 RX ADMIN — LIDOCAINE HYDROCHLORIDE 50 MG: 20 INJECTION INTRAVENOUS at 09:00

## 2024-04-12 RX ADMIN — SODIUM CHLORIDE 50 ML/HR: 9 INJECTION, SOLUTION INTRAVENOUS at 08:39

## 2024-04-12 RX ADMIN — Medication: at 08:00

## 2024-04-12 RX ADMIN — ACETAMINOPHEN 975 MG: 325 TABLET ORAL at 08:19

## 2024-04-12 RX ADMIN — ONDANSETRON 4 MG: 2 INJECTION INTRAMUSCULAR; INTRAVENOUS at 08:39

## 2024-04-12 RX ADMIN — FENTANYL CITRATE 50 MCG: 50 INJECTION INTRAMUSCULAR; INTRAVENOUS at 09:00

## 2024-04-12 RX ADMIN — NITROGLYCERIN 0.5 INCH: 20 OINTMENT TOPICAL at 08:19

## 2024-04-12 RX ADMIN — FAMOTIDINE 20 MG: 10 INJECTION, SOLUTION INTRAVENOUS at 08:39

## 2024-04-12 RX ADMIN — SODIUM CITRATE AND CITRIC ACID MONOHYDRATE 30 ML: 500; 334 SOLUTION ORAL at 08:19

## 2024-04-12 RX ADMIN — CEFAZOLIN SODIUM 2 G: 2 INJECTION, SOLUTION INTRAVENOUS at 08:54

## 2024-04-12 SDOH — HEALTH STABILITY: MENTAL HEALTH: CURRENT SMOKER: 0

## 2024-04-12 ASSESSMENT — COLUMBIA-SUICIDE SEVERITY RATING SCALE - C-SSRS
6. HAVE YOU EVER DONE ANYTHING, STARTED TO DO ANYTHING, OR PREPARED TO DO ANYTHING TO END YOUR LIFE?: NO
2. HAVE YOU ACTUALLY HAD ANY THOUGHTS OF KILLING YOURSELF?: NO
1. IN THE PAST MONTH, HAVE YOU WISHED YOU WERE DEAD OR WISHED YOU COULD GO TO SLEEP AND NOT WAKE UP?: NO

## 2024-04-12 ASSESSMENT — PAIN SCALES - GENERAL: PAIN_LEVEL: 0

## 2024-04-12 NOTE — ANESTHESIA POSTPROCEDURE EVALUATION
"Patient: Cade Olivas \"Kaden\"    Procedure Summary       Date: 04/12/24 Room / Location: P[facility] OR 08 / Virtual [facility] OR    Anesthesia Start: 0854 Anesthesia Stop: 0933    Procedure: BILATERAL LOWER VENOGRAM WITH INTRAVASCULAR ULTRASOUND *C-ARM*HOPE WITH AMOR (IVUS)  GINA WITH METRONIC (Bilateral) Diagnosis:       Venous insufficiency      (I87.2)    Surgeons: Hood Gillespie DO Responsible Provider: MANJINDER Leiva    Anesthesia Type: MAC ASA Status: 3            Anesthesia Type: MAC    Vitals Value Taken Time   /67 04/12/24 1022   Temp 37.1 °C (98.7 °F) 04/12/24 0929   Pulse 74 04/12/24 1027   Resp 15 04/12/24 1027   SpO2 92 % 04/12/24 1027   Vitals shown include unfiled device data.    Anesthesia Post Evaluation    Patient location during evaluation: PACU  Patient participation: complete - patient participated  Level of consciousness: awake and alert  Pain score: 0  Pain management: adequate  Airway patency: patent  Cardiovascular status: acceptable and hemodynamically stable  Respiratory status: acceptable and room air  Hydration status: acceptable  Postoperative Nausea and Vomiting: none    There were no known notable events for this encounter.    "

## 2024-04-12 NOTE — ANESTHESIA PREPROCEDURE EVALUATION
"Patient: Cade Olivas \"Kaden\"    Procedure Information       Date/Time: 04/12/24 0900    Procedure: BILATERAL LOWER VENOGRAM WITH INTRAVASCULAR ULTRASOUND *C-ARM*HOPE WITH AMOR (IVUS)  FUENTES WITH METRONIC (Bilateral) - 60 MINUTES PROCEDURE TIME    Location: [facility] OR 08 / Virtual [facility] OR    Surgeons: Hood Gillespie, DO            Relevant Problems   Cardiac   (+) Atrial fibrillation (Multi)   (+) Benign essential hypertension   (+) Hyperlipidemia   (+) Nonrheumatic tricuspid valve regurgitation      Pulmonary   (+) SOB (shortness of breath) on exertion      Neuro   (+) Polyneuropathy associated with underlying disease (Multi)      /Renal   (+) BPH (benign prostatic hyperplasia)   (+) Prostate cancer (Multi)      Endocrine   (+) Class 2 severe obesity with serious comorbidity and body mass index (BMI) of 38.0 to 38.9 in adult (CMS/HCC)   (+) Type 2 diabetes mellitus with other specified complication, without long-term current use of insulin (Multi)      Hematology   (+) Anemia       Clinical information reviewed:   Tobacco  Allergies  Meds  Problems  Med Hx  Surg Hx   Fam Hx  Soc   Hx        NPO Detail:  No data recorded     Physical Exam    Airway  Mallampati: III  TM distance: >3 FB  Neck ROM: full     Cardiovascular - normal exam     Dental   Comments: Caps on all his front teeth.   Pulmonary - normal exam     Abdominal   (+) obese         Anesthesia Plan    History of general anesthesia?: yes  History of complications of general anesthesia?: no    ASA 3     MAC     The patient is not a current smoker.    intravenous induction   Postoperative administration of opioids is intended.  Anesthetic plan and risks discussed with patient.  Use of blood products discussed with patient who.    Plan discussed with CRNA.  " Call to see if she is tolerating the injections well.   We may be able to increase then dose    Call patient

## 2024-04-12 NOTE — OP NOTE
"BILATERAL LOWER VENOGRAM WITH INTRAVASCULAR ULTRASOUND *C-ARM*HOPE WITH AMOR (IVUS)  FUENTES WITH METRONIC (B) Operative Note     Date: 2024  OR Location: [facility]    Name: Cade Olivas \"Louise", : 1940, Age: 83 y.o., MRN: [id number], Sex: male    Diagnosis  Pre-op Diagnosis     * Venous insufficiency [I87.2] Post-op Diagnosis     * Venous insufficiency [I87.2]     Procedures  BILATERAL LOWER VENOGRAM WITH INTRAVASCULAR ULTRASOUND *C-ARM*HOPE WITH AMOR (IVUS)  FUENTES WITH METRONIC  27110 - [place] [facility] CATH PLMT TONY SYS 1ST ORDER BRANCH    BILATERAL LOWER VENOGRAM WITH INTRAVASCULAR ULTRASOUND *C-ARM*HOPE WITH AMOR (IVUS)  FUENTES WITH METRONIC  72437 - [place] INTRAVASCULAR US NONCORONARY RS&I INTIAL VESSEL    BILATERAL LOWER VENOGRAM WITH INTRAVASCULAR ULTRASOUND *C-ARM*HOPE WITH AMOR (IVUS)  FUENTES WITH METRONIC  81161 - [place] INTRAVASCULAR US NONCORONARY RS&I ADDL VESSEL    BILATERAL LOWER VENOGRAM WITH INTRAVASCULAR ULTRASOUND *C-ARM*HOPE WITH AMOR (IVUS)  FUENTES WITH METRONIC  95007 - [facility] VENOGRAPHY EXTREMITY BILATERAL RS&I      Surgeons      * Hood Gillespie - Primary    Resident/Fellow/Other Assistant:  Surgeons and Role:  * No surgeons found with a matching role *    Procedure Summary  Anesthesia: Monitor Anesthesia Care  ASA: III  Anesthesia Staff: CRNA: TRUDI Leiva-CRNA  Estimated Blood Loss: 10mL  Intra-op Medications:   Administrations occurring from 0900 to 1030 on 24:   Medication Name Total Dose   sodium chloride 0.9% infusion Cannot be calculated              Anesthesia Record               Intraprocedure I/O Totals          Intake    Propofol Drip 0.00 mL    The total shown is the total volume documented since Anesthesia Start was filed.    sodium chloride 0.9% infusion 400.00 mL    ceFAZolin in dextrose (iso-os) 2 gram/100 mL 100.00 mL    Total Intake 500 mL       Output    Est. Blood Loss 10 mL    Total Output 10 mL       Net    Net Volume 490 mL          Specimen: No " specimens collected     Staff:   Circulator: Norma Li RN  Scrub Person: Hayley Barksdale         Drains and/or Catheters: * None in log *    Tourniquet Times:         Implants:     Findings: normal venogram IVUS    Indications: Kaden Olivas is an 83 y.o. male who is having surgery for I87.2. Bilateral lower extremity edema with right ulcer    The patient was seen in the preoperative area. The risks, benefits, complications, treatment options, non-operative alternatives, expected recovery and outcomes were discussed with the patient. The possibilities of reaction to medication, pulmonary aspiration, injury to surrounding structures, bleeding, recurrent infection, the need for additional procedures, failure to diagnose a condition, and creating a complication requiring transfusion or operation were discussed with the patient. The patient concurred with the proposed plan, giving informed consent.  The site of surgery was properly noted/marked if necessary per policy. The patient has been actively warmed in preoperative area. Preoperative antibiotics have been ordered and given within 1 hours of incision. Venous thrombosis prophylaxis are not indicated.    Procedure Details: Patient was brought to the OR suite placed in the supine position administered monitored anesthesia care both groins are sterilely prepped and draped standard fashion.  Ultrasound guidance was utilized to access the left femoral vein advancing wire to the vena cava with no difficulty sheath was advanced contrast venography was performed demonstrating no evidence of intraluminal filling defect in the vena cava or iliac vein.  IVUS device was subsequently advanced and withdrawn inferior vena cava surface reference was 395 mm², left common iliac vein surface reference was 219 mm² no area of significant compression was noted.  Left external  iliac surface reference was 167 mm²,  left common femoral vein surface reference was 160 mm².  Ultrasound  guidance was utilized to access the right femoral vein Bentson wire to the vena cava with no difficulty sheath was advanced contrast venography was performed demonstrated no evidence of intraluminal filling defect in the iliac circuit.  The right common iliac surface reference was 257 mm², right extrailiac vein surface reference was 167 mm², the right common femoral vein surface reference was 136 mm².      At the termination procedure all catheter wires were removed manual compression was applied excellent hemostasis maintained patient will consent the PACU in stable condition.       Complications:  None; patient tolerated the procedure well.    Disposition: PACU - hemodynamically stable.  Condition: stable         Additional Details:      Attending Attestation: I was present and scrubbed for the entire procedure.    Hood Gillespie  Phone Number: [phone]

## 2024-04-15 ENCOUNTER — APPOINTMENT (OUTPATIENT)
Dept: PRIMARY CARE | Facility: CLINIC | Age: 84
End: 2024-04-15
Payer: MEDICARE

## 2024-04-15 ASSESSMENT — PAIN SCALES - GENERAL: PAINLEVEL_OUTOF10: 0 - NO PAIN

## 2024-04-19 ENCOUNTER — TELEPHONE (OUTPATIENT)
Dept: PRIMARY CARE | Facility: CLINIC | Age: 84
End: 2024-04-19

## 2024-04-19 ENCOUNTER — HOSPITAL ENCOUNTER (EMERGENCY)
Facility: HOSPITAL | Age: 84
Discharge: HOME | End: 2024-04-19
Attending: STUDENT IN AN ORGANIZED HEALTH CARE EDUCATION/TRAINING PROGRAM
Payer: MEDICARE

## 2024-04-19 ENCOUNTER — APPOINTMENT (OUTPATIENT)
Dept: RADIOLOGY | Facility: HOSPITAL | Age: 84
End: 2024-04-19
Payer: MEDICARE

## 2024-04-19 ENCOUNTER — OFFICE VISIT (OUTPATIENT)
Dept: PRIMARY CARE | Facility: CLINIC | Age: 84
End: 2024-04-19
Payer: MEDICARE

## 2024-04-19 ENCOUNTER — LAB (OUTPATIENT)
Dept: LAB | Facility: LAB | Age: 84
End: 2024-04-19
Payer: MEDICARE

## 2024-04-19 VITALS
HEART RATE: 78 BPM | HEIGHT: 70 IN | OXYGEN SATURATION: 99 % | RESPIRATION RATE: 16 BRPM | DIASTOLIC BLOOD PRESSURE: 80 MMHG | WEIGHT: 273 LBS | SYSTOLIC BLOOD PRESSURE: 134 MMHG | BODY MASS INDEX: 39.08 KG/M2 | TEMPERATURE: 97.8 F

## 2024-04-19 VITALS
DIASTOLIC BLOOD PRESSURE: 80 MMHG | TEMPERATURE: 97.4 F | SYSTOLIC BLOOD PRESSURE: 126 MMHG | OXYGEN SATURATION: 94 % | BODY MASS INDEX: 39.23 KG/M2 | HEART RATE: 76 BPM | WEIGHT: 273.4 LBS

## 2024-04-19 DIAGNOSIS — L03.115 CELLULITIS OF RIGHT LOWER EXTREMITY: ICD-10-CM

## 2024-04-19 DIAGNOSIS — I48.20 CHRONIC ATRIAL FIBRILLATION (MULTI): ICD-10-CM

## 2024-04-19 DIAGNOSIS — S81.801D WOUND OF RIGHT LOWER EXTREMITY, SUBSEQUENT ENCOUNTER: Primary | ICD-10-CM

## 2024-04-19 DIAGNOSIS — L03.115 CELLULITIS OF RIGHT LOWER EXTREMITY: Primary | ICD-10-CM

## 2024-04-19 DIAGNOSIS — R79.1 SUBTHERAPEUTIC INTERNATIONAL NORMALIZED RATIO (INR): ICD-10-CM

## 2024-04-19 DIAGNOSIS — S81.801D WOUND OF RIGHT LOWER EXTREMITY, SUBSEQUENT ENCOUNTER: ICD-10-CM

## 2024-04-19 LAB
ALBUMIN SERPL BCP-MCNC: 3.9 G/DL (ref 3.4–5)
ALP SERPL-CCNC: 58 U/L (ref 33–136)
ALT SERPL W P-5'-P-CCNC: 13 U/L (ref 10–52)
ANION GAP SERPL CALC-SCNC: 10 MMOL/L (ref 10–20)
AST SERPL W P-5'-P-CCNC: 16 U/L (ref 9–39)
BASOPHILS # BLD AUTO: 0.04 X10*3/UL (ref 0–0.1)
BASOPHILS NFR BLD AUTO: 0.6 %
BILIRUB SERPL-MCNC: 0.8 MG/DL (ref 0–1.2)
BNP SERPL-MCNC: 176 PG/ML (ref 0–99)
BUN SERPL-MCNC: 18 MG/DL (ref 6–23)
CALCIUM SERPL-MCNC: 9.1 MG/DL (ref 8.6–10.3)
CARDIAC TROPONIN I PNL SERPL HS: 7 NG/L (ref 0–20)
CHLORIDE SERPL-SCNC: 107 MMOL/L (ref 98–107)
CO2 SERPL-SCNC: 29 MMOL/L (ref 21–32)
CREAT SERPL-MCNC: 0.88 MG/DL (ref 0.5–1.3)
EGFRCR SERPLBLD CKD-EPI 2021: 85 ML/MIN/1.73M*2
EOSINOPHIL # BLD AUTO: 0.14 X10*3/UL (ref 0–0.4)
EOSINOPHIL NFR BLD AUTO: 2.1 %
ERYTHROCYTE [DISTWIDTH] IN BLOOD BY AUTOMATED COUNT: 14.9 % (ref 11.5–14.5)
GLUCOSE SERPL-MCNC: 94 MG/DL (ref 74–99)
HCT VFR BLD AUTO: 34.1 % (ref 41–52)
HGB BLD-MCNC: 11.5 G/DL (ref 13.5–17.5)
IMM GRANULOCYTES # BLD AUTO: 0.02 X10*3/UL (ref 0–0.5)
IMM GRANULOCYTES NFR BLD AUTO: 0.3 % (ref 0–0.9)
INR PPP: 1.3 (ref 0.9–1.1)
LACTATE SERPL-SCNC: 1.1 MMOL/L (ref 0.4–2)
LYMPHOCYTES # BLD AUTO: 1.41 X10*3/UL (ref 0.8–3)
LYMPHOCYTES NFR BLD AUTO: 20.7 %
MAGNESIUM SERPL-MCNC: 1.87 MG/DL (ref 1.6–2.4)
MCH RBC QN AUTO: 31.3 PG (ref 26–34)
MCHC RBC AUTO-ENTMCNC: 33.7 G/DL (ref 32–36)
MCV RBC AUTO: 93 FL (ref 80–100)
MONOCYTES # BLD AUTO: 0.73 X10*3/UL (ref 0.05–0.8)
MONOCYTES NFR BLD AUTO: 10.7 %
NEUTROPHILS # BLD AUTO: 4.47 X10*3/UL (ref 1.6–5.5)
NEUTROPHILS NFR BLD AUTO: 65.6 %
NRBC BLD-RTO: 0 /100 WBCS (ref 0–0)
PLATELET # BLD AUTO: 256 X10*3/UL (ref 150–450)
POTASSIUM SERPL-SCNC: 4.4 MMOL/L (ref 3.5–5.3)
PROT SERPL-MCNC: 6.8 G/DL (ref 6.4–8.2)
PROTHROMBIN TIME: 14.8 SECONDS (ref 9.8–12.8)
RBC # BLD AUTO: 3.68 X10*6/UL (ref 4.5–5.9)
SODIUM SERPL-SCNC: 142 MMOL/L (ref 136–145)
WBC # BLD AUTO: 6.8 X10*3/UL (ref 4.4–11.3)

## 2024-04-19 PROCEDURE — 83735 ASSAY OF MAGNESIUM: CPT | Performed by: STUDENT IN AN ORGANIZED HEALTH CARE EDUCATION/TRAINING PROGRAM

## 2024-04-19 PROCEDURE — 85025 COMPLETE CBC W/AUTO DIFF WBC: CPT | Performed by: STUDENT IN AN ORGANIZED HEALTH CARE EDUCATION/TRAINING PROGRAM

## 2024-04-19 PROCEDURE — 99284 EMERGENCY DEPT VISIT MOD MDM: CPT | Mod: 25

## 2024-04-19 PROCEDURE — 84484 ASSAY OF TROPONIN QUANT: CPT | Performed by: STUDENT IN AN ORGANIZED HEALTH CARE EDUCATION/TRAINING PROGRAM

## 2024-04-19 PROCEDURE — 80053 COMPREHEN METABOLIC PANEL: CPT | Performed by: STUDENT IN AN ORGANIZED HEALTH CARE EDUCATION/TRAINING PROGRAM

## 2024-04-19 PROCEDURE — 85610 PROTHROMBIN TIME: CPT | Mod: REPEAT CLINICAL DIAGNOSTIC LABORATORY TEST

## 2024-04-19 PROCEDURE — 2500000006 HC RX 250 W HCPCS SELF ADMINISTERED DRUGS (ALT 637 FOR ALL PAYERS): Performed by: STUDENT IN AN ORGANIZED HEALTH CARE EDUCATION/TRAINING PROGRAM

## 2024-04-19 PROCEDURE — 87040 BLOOD CULTURE FOR BACTERIA: CPT | Mod: PORLAB | Performed by: STUDENT IN AN ORGANIZED HEALTH CARE EDUCATION/TRAINING PROGRAM

## 2024-04-19 PROCEDURE — 93970 EXTREMITY STUDY: CPT

## 2024-04-19 PROCEDURE — 83605 ASSAY OF LACTIC ACID: CPT | Performed by: STUDENT IN AN ORGANIZED HEALTH CARE EDUCATION/TRAINING PROGRAM

## 2024-04-19 PROCEDURE — 93970 EXTREMITY STUDY: CPT | Performed by: RADIOLOGY

## 2024-04-19 PROCEDURE — 83880 ASSAY OF NATRIURETIC PEPTIDE: CPT | Performed by: STUDENT IN AN ORGANIZED HEALTH CARE EDUCATION/TRAINING PROGRAM

## 2024-04-19 PROCEDURE — 2500000001 HC RX 250 WO HCPCS SELF ADMINISTERED DRUGS (ALT 637 FOR MEDICARE OP): Performed by: STUDENT IN AN ORGANIZED HEALTH CARE EDUCATION/TRAINING PROGRAM

## 2024-04-19 PROCEDURE — 36415 COLL VENOUS BLD VENIPUNCTURE: CPT | Performed by: STUDENT IN AN ORGANIZED HEALTH CARE EDUCATION/TRAINING PROGRAM

## 2024-04-19 RX ORDER — CEPHALEXIN 250 MG/1
500 CAPSULE ORAL ONCE
Status: COMPLETED | OUTPATIENT
Start: 2024-04-19 | End: 2024-04-19

## 2024-04-19 RX ORDER — SULFAMETHOXAZOLE AND TRIMETHOPRIM 800; 160 MG/1; MG/1
1 TABLET ORAL 2 TIMES DAILY
Qty: 14 TABLET | Refills: 0 | Status: SHIPPED | OUTPATIENT
Start: 2024-04-19 | End: 2024-04-19

## 2024-04-19 RX ORDER — SULFAMETHOXAZOLE AND TRIMETHOPRIM 800; 160 MG/1; MG/1
1 TABLET ORAL 2 TIMES DAILY
Qty: 14 TABLET | Refills: 0 | Status: SHIPPED | OUTPATIENT
Start: 2024-04-19 | End: 2024-04-26

## 2024-04-19 RX ORDER — FUROSEMIDE 40 MG/1
40 TABLET ORAL 2 TIMES DAILY
Qty: 28 TABLET | Refills: 0 | Status: SHIPPED | OUTPATIENT
Start: 2024-04-19 | End: 2024-05-13 | Stop reason: SDUPTHER

## 2024-04-19 RX ORDER — SULFAMETHOXAZOLE AND TRIMETHOPRIM 800; 160 MG/1; MG/1
1 TABLET ORAL ONCE
Status: COMPLETED | OUTPATIENT
Start: 2024-04-19 | End: 2024-04-19

## 2024-04-19 RX ORDER — CEPHALEXIN 500 MG/1
500 CAPSULE ORAL 4 TIMES DAILY
Qty: 28 CAPSULE | Refills: 0 | Status: SHIPPED | OUTPATIENT
Start: 2024-04-19 | End: 2024-04-26

## 2024-04-19 RX ADMIN — SULFAMETHOXAZOLE AND TRIMETHOPRIM 1 TABLET: 800; 160 TABLET ORAL at 18:20

## 2024-04-19 RX ADMIN — CEPHALEXIN 500 MG: 250 CAPSULE ORAL at 18:20

## 2024-04-19 ASSESSMENT — LIFESTYLE VARIABLES
TOTAL SCORE: 0
HAVE PEOPLE ANNOYED YOU BY CRITICIZING YOUR DRINKING: NO
HAVE YOU EVER FELT YOU SHOULD CUT DOWN ON YOUR DRINKING: NO
EVER FELT BAD OR GUILTY ABOUT YOUR DRINKING: NO
EVER HAD A DRINK FIRST THING IN THE MORNING TO STEADY YOUR NERVES TO GET RID OF A HANGOVER: NO

## 2024-04-19 ASSESSMENT — PAIN DESCRIPTION - PAIN TYPE: TYPE: ACUTE PAIN

## 2024-04-19 ASSESSMENT — PAIN DESCRIPTION - LOCATION: LOCATION: LEG

## 2024-04-19 ASSESSMENT — PAIN SCALES - GENERAL: PAINLEVEL_OUTOF10: 4

## 2024-04-19 ASSESSMENT — PAIN - FUNCTIONAL ASSESSMENT: PAIN_FUNCTIONAL_ASSESSMENT: 0-10

## 2024-04-19 NOTE — TELEPHONE ENCOUNTER
I spoke with Dr. Smith and she said that she realizes that there is an interaction, but the pt has an infection in his leg that they are trying to get healed before they have to amputate the leg.  She said that they will be monitoring his INR's very closely.  I called the pharmacy and relayed this msg to the pharmacist.

## 2024-04-19 NOTE — TELEPHONE ENCOUNTER
[facility] pharmacy in [place] left a msg stating that there is a drug interaction with the Bactrim and his Warfarin.  It causes the blood to get really thin.  Please replace the Batrim.

## 2024-04-19 NOTE — ED PROVIDER NOTES
HPI   Chief Complaint   Patient presents with    Leg Swelling     Pt reports he's had right leg swelling since December. Pt reports its a 4/10    Swollen Glands     Pt also has swollen facial glands since Friday after he had a venogram       HPI: Patient is an 83-year-old male, patient is presenting to the emergency department today for concern for wound on his right lower extremity.  He was recently on doxycycline and Augmentin 2 to 3 weeks ago, had initially improved but then began blistering after the antibiotics were finished.  He did have a venogram on the 12th of the bilateral lower extremities for which he then developed cervical lymphadenopathy.  He is also having some painful nodular nodes on the palms of his hands bilaterally.  He had been off of the Coumadin for the venogram.  He was told to come to the ER for further evaluation..      ROS: Complete 12 point review of systems performed, otherwise negative except as noted in the history of present illness    PMH: Reviewed, documented below in note. Pertinents in HPI  PSH: Reviewed and documented below in note. Pertinents in HPI  SH: No tobacco alcohol or illicits   Fam: Reviewed, noncontributory to patients current complaint  MEDS: Reviewed and documented below in note. Pertinents in HPI  ALLERGIES: Reviewed and documented below in note.        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    Pain in right knee 03/07/2018    Right knee pain    Pain in right knee 09/17/2018    Pain in both knees,  "unspecified chronicity    Pain in unspecified knee 2018    Knee pain    Personal history of other drug therapy     History of influenza vaccination    Personal history of other specified conditions 2019    History of urinary frequency    Personal history of urinary (tract) infections 2019    History of urinary tract infection    Personal history of urinary (tract) infections 2019    History of urinary tract infection     Past Surgical History:   Procedure Laterality Date    HERNIA REPAIR  10/30/2013    Hernia Repair    OTHER SURGICAL HISTORY  2022    Knee replacement     Family History   Adopted: Yes   Problem Relation Name Age of Onset    No Known Problems Mother      Other (adopted) Other       Social History     Tobacco Use    Smoking status: Former     Current packs/day: 0.00     Average packs/day: 0.5 packs/day for 10.0 years (5.0 ttl pk-yrs)     Types: Cigarettes     Start date:      Quit date:      Years since quittin.3     Passive exposure: Past    Smokeless tobacco: Never   Vaping Use    Vaping status: Never Used   Substance Use Topics    Alcohol use: Yes     Comment: Rare    Drug use: Never       Physical Exam   Visit Vitals  /80   Pulse 78   Temp 36.6 °C (97.8 °F) (Temporal)   Resp 16   Ht 1.778 m (5' 10\")   Wt 124 kg (273 lb)   SpO2 99%   BMI 39.17 kg/m²   Smoking Status Former   BSA 2.47 m²      Physical Exam  Vitals and nursing note reviewed.   Constitutional:       Appearance: Normal appearance.   HENT:      Head: Normocephalic and atraumatic.   Neck:      Vascular: No carotid bruit.   Cardiovascular:      Rate and Rhythm: Normal rate. Rhythm irregular.      Pulses: Normal pulses.      Heart sounds: Normal heart sounds.   Pulmonary:      Effort: Pulmonary effort is normal.      Breath sounds: Normal breath sounds.   Abdominal:      General: There is no distension.      Palpations: Abdomen is soft.      Tenderness: There is no abdominal tenderness. There " is no guarding or rebound.   Musculoskeletal:         General: Tenderness present. No deformity or signs of injury. Normal range of motion.      Cervical back: Normal range of motion. No rigidity.      Right lower leg: Edema present.      Left lower leg: Edema present.      Comments: Does have some painful nodules, no overlying skin changes, no fluctuance, to the bilateral palmar aspects of the hands.  They are not on the oral mucosa, not in the genitalia, they are not on the feet.  The patient also has evidence of cellulitis with central blistering noted to the right lower extremity, does not cross any joint line, is erythematous, warm, tender to palpation.  He also has bilateral edema of the lower extremities   Skin:     General: Skin is warm and dry.      Capillary Refill: Capillary refill takes less than 2 seconds.   Neurological:      General: No focal deficit present.      Mental Status: He is alert and oriented to person, place, and time.      Sensory: No sensory deficit.      Motor: No weakness.   Psychiatric:         Mood and Affect: Mood normal.         Behavior: Behavior normal.         Vascular US lower extremity venous duplex bilateral   Final Result   No evidence of DVT in the bilateral lower extremities.             Signed by: Ab Burkett 4/19/2024 5:19 PM   Dictation workstation:   [id number]          Labs Reviewed   CBC WITH AUTO DIFFERENTIAL - Abnormal       Result Value    WBC 6.8      nRBC 0.0      RBC 3.68 (*)     Hemoglobin 11.5 (*)     Hematocrit 34.1 (*)     MCV 93      MCH 31.3      MCHC 33.7      RDW 14.9 (*)     Platelets 256      Neutrophils % 65.6      Immature Granulocytes %, Automated 0.3      Lymphocytes % 20.7      Monocytes % 10.7      Eosinophils % 2.1      Basophils % 0.6      Neutrophils Absolute 4.47      Immature Granulocytes Absolute, Automated 0.02      Lymphocytes Absolute 1.41      Monocytes Absolute 0.73      Eosinophils Absolute 0.14      Basophils Absolute 0.04      B-TYPE NATRIURETIC PEPTIDE - Abnormal     (*)     Narrative:        <100 pg/mL - Heart failure unlikely  100-299 pg/mL - Intermediate probability of acute heart                  failure exacerbation. Correlate with clinical                  context and patient history.    >=300 pg/mL - Heart Failure likely. Correlate with clinical                  context and patient history.    BNP testing is performed using different testing methodology at [facility] than at other [facility]. Direct result comparisons should only be made within the same method.      MAGNESIUM - Normal    Magnesium 1.87     COMPREHENSIVE METABOLIC PANEL - Normal    Glucose 94      Sodium 142      Potassium 4.4      Chloride 107      Bicarbonate 29      Anion Gap 10      Urea Nitrogen 18      Creatinine 0.88      eGFR 85      Calcium 9.1      Albumin 3.9      Alkaline Phosphatase 58      Total Protein 6.8      AST 16      Bilirubin, Total 0.8      ALT 13     TROPONIN I, HIGH SENSITIVITY - Normal    Troponin I, High Sensitivity 7      Narrative:     Less than 99th percentile of normal range cutoff-  Female and children under 18 years old <14 ng/L; Male <21 ng/L: Negative  Repeat testing should be performed if clinically indicated.     Female and children under 18 years old 14-50 ng/L; Male 21-50 ng/L:  Consistent with possible cardiac damage and possible increased clinical   risk. Serial measurements may help to assess extent of myocardial damage.     >50 ng/L: Consistent with cardiac damage, increased clinical risk and  myocardial infarction. Serial measurements may help assess extent of   myocardial damage.      NOTE: Children less than 1 year old may have higher baseline troponin   levels and results should be interpreted in conjunction with the overall   clinical context.     NOTE: Troponin I testing is performed using a different   testing methodology at [facility] than at other   [facility].  Direct result comparisons should only   be made within the same method.   LACTATE - Normal    Lactate 1.1      Narrative:     Venipuncture immediately after or during the administration of Metamizole may lead to falsely low results. Testing should be performed immediately  prior to Metamizole dosing.   BLOOD CULTURE   BLOOD CULTURE         ED Course & MDM   Diagnoses as of 04/19/24 1844   Cellulitis of right lower extremity   Subtherapeutic international normalized ratio (INR)           Medical Decision Making  All mentioned lab results, ECGs, and imaging were independently reviewed by myself  - Patient evaluated. The patient is presenting to the emergency department today for evaluation for multiple concerns.  Concern for septic emboli in the hands, concern for cellulitis of the right lower extremity, and concern for potential DVTs of the bilateral lower extremities given he has been off of his Coumadin.  Basic labs were obtained as well as ultrasounds.  He does not have any leukocytosis, has had stable anemia, kidney and liver function are within normal limits, lactate is normal.  I did draw blood cultures.  DVT ultrasounds of the bilateral lower extremities are negative.  I feel that given the patient is otherwise hemodynamically stable without leukocytosis or an elevated lactate that the cellulitis of the right lower extremity given he has been off of antibiotics for over a week can be sent home on Bactrim as well as Keflex as he has close outpatient follow-up with the wound center in 4 days.  In terms of the tender nodules on the hands, will septic emboli is on my differential, he shows no evidence of any microvascular pathology with normal kidney and liver function.  Blood cultures were obtained and we will follow-up on these results.  In the meantime I feel he can go home on dual antibiotics with close follow-up.  He was instructed to increase his Coumadin given his subtherapeutic INR to 10 mg this evening,  7.5 mg on Saturday, and resume his normal dose on Sunday and a recheck of his INR next week with his primary care physician.  All questions were answered.  I did give the patient very strict return precautions which him and the wife expressed understanding with and the patient was discharged otherwise stable condition      - Monitored for any changes in stability or symptomatology. Patient remained stable.   - Counseled regarding labs, imaging, diagnosis, and plan. Patient was agreeable. All questions were answered. The patient was receptive and agreeable to the plan of care.   -The patient was instructed to return to the emergency department if any symptoms recurred, worsened, or if there were any additional concerns.    *Disclaimer: This note was dictated by speech recognition. Minor errors in transcription may be present. Please call with questions.    Reynaldo Monroy MD             Your medication list        START taking these medications        Instructions Last Dose Given Next Dose Due   cephalexin 500 mg capsule  Commonly known as: Keflex      Take 1 capsule (500 mg) by mouth 4 times a day for 7 days.              CHANGE how you take these medications        Instructions Last Dose Given Next Dose Due   sulfamethoxazole-trimethoprim 800-160 mg tablet  Commonly known as: Bactrim DS  What changed: You were already taking a medication with the same name, and this prescription was added. Make sure you understand how and when to take each.      Take 1 tablet by mouth 2 times a day for 7 days.       sulfamethoxazole-trimethoprim 800-160 mg tablet  Commonly known as: Bactrim DS  What changed: Another medication with the same name was added. Make sure you understand how and when to take each.      Take 1 tablet by mouth 2 times a day for 7 days.              ASK your doctor about these medications        Instructions Last Dose Given Next Dose Due   cholecalciferol 50 mcg (2,000 unit) capsule  Commonly known as:  Vitamin D-3           cyanocobalamin 100 mcg tablet  Commonly known as: Vitamin B-12           furosemide 40 mg tablet  Commonly known as: Lasix      Take 1 tablet (40 mg) by mouth 2 times a day for 14 days.       glimepiride 2 mg tablet  Commonly known as: Amaryl      TAKE 1 TABLET BY MOUTH ONCE DAILY IN THE MORNING BEFORE MEAL(S)       lisinopril 10 mg tablet      Take 1 tablet by mouth once daily       metFORMIN 500 mg tablet  Commonly known as: Glucophage      Take 2 tablets by mouth twice daily       metoprolol tartrate 50 mg tablet  Commonly known as: Lopressor      Take 1/2 (one-half) tablet by mouth twice daily       PreserVision AREDS-2 250-90-40-1 mg capsule  Generic drug: vit C,S-Ud-fkcwo-lutein-zeaxan           simvastatin 20 mg tablet  Commonly known as: Zocor      Take 1 tablet by mouth once daily       terazosin 5 mg capsule  Commonly known as: Hytrin      Take 2 capsules (10 mg) by mouth once daily at bedtime.       warfarin 5 mg tablet  Commonly known as: Coumadin           warfarin 7.5 mg tablet  Commonly known as: Coumadin      TAKE 1 TABLET BY MOUTH ONCE DAILY AT BEDTIME. TAKE AS DIRECTED AFTER EACH INR.       warfarin 10 mg tablet  Commonly known as: Coumadin      Take 1 tablet by mouth once daily                 Where to Get Your Medications        These medications were sent to [facility], [place]  [address] [facility] [address]  [address]) [place] [address]      Phone: [phone]   cephalexin 500 mg capsule  sulfamethoxazole-trimethoprim 800-160 mg tablet  sulfamethoxazole-trimethoprim 800-160 mg tablet         Procedure  Procedures     *This report was transcribed using voice recognition software.  Every effort was made to ensure accuracy; however, inadvertent computerized transcription errors may be present.*  Cesar Monroy MD  04/19/24         Cesar Monroy MD  04/19/24 4797

## 2024-04-19 NOTE — PROGRESS NOTES
Subjective   Patient ID: Kaden Olivas is a 83 y.o. male who presents for Wound Check.    Mr. Olivas returns to clinic for follow-up on wound on RLE; one week ago he had venogram that was wnl.     Today, the wound looks like it's worsening / more blistering appearance.   After the venogram he did notice cervical lymphadenopathy.   He has these painful mass/nodule-like lesions on palms of hands that recently appeared as well. He was off warfarin for time frame around his venogram.     No fevers or other systemic symptoms.     Objective   /80 (BP Location: Left arm, Patient Position: Sitting, BP Cuff Size: Large adult)   Pulse 76   Temp 36.3 °C (97.4 °F) (Skin)   Wt 124 kg (273 lb 6.4 oz)   SpO2 94%   BMI 39.23 kg/m²     Physical Exam  Cardiovascular:      Rate and Rhythm: Normal rate. Rhythm irregular.   Lymphadenopathy:      Cervical: Cervical adenopathy present.      Right cervical: Superficial cervical adenopathy present.      Left cervical: Superficial cervical adenopathy present.   Skin:     General: Skin is warm.             Comments: Wound on anterior aspect of right lower extremity that seems to have increased in size expanding mildly laterally.  The ulceration does appear to be mildly bulging.  There is extension of erythematous skin extending about 2 cm inferior to the knee and then 2 cm superior to the ankle there is still +4 pitting edema.  The back of the leg is weeping         Assessment/Plan   Diagnoses and all orders for this visit:  Wound of right lower extremity, subsequent encounter  -     Referral to Wound Clinic; Future  -     sulfamethoxazole-trimethoprim (Bactrim DS) 800-160 mg tablet; Take 1 tablet by mouth 2 times a day for 7 days.  -     furosemide (Lasix) 40 mg tablet; Take 1 tablet (40 mg) by mouth 2 times a day for 14 days.  Cellulitis of right lower extremity  -     sulfamethoxazole-trimethoprim (Bactrim DS) 800-160 mg tablet; Take 1 tablet by mouth 2 times a day for 7  days.  -     furosemide (Lasix) 40 mg tablet; Take 1 tablet (40 mg) by mouth 2 times a day for 14 days.  Chronic atrial fibrillation (Multi)  -     Protime-INR; Future    Kaden is an 83-year-old male presenting to the clinic to follow-up malik wound. The wound appeared approximatley 2-3 weeks ago on the anterior aspect of the RLE and was treated w/ augmentin and doxycycline. At follow-up initially the wound appeared to have opened but surrounding erythema was relatively the same. He had a venogram on 4/12 and later that day developed cervical lymphadenoapthy. IN the past few days he developed  painful nodular/masses on palms of hands bilaterally.   He has remained afebrile and does appear well. Initially I thought we may be able to cover w/ bactrim & lasix to treat cellulitis and decrease swelling of BLE. After further consideration, with the nodules in palms I would be concerned that he may have developed blood clots from the discontinuation of warfarin for his procedure. Alternatively, there could be concern for septic emboli despite lack of fever and B symptoms. Eitherway, the safestplace to rule this out would be the ED. Patient was instructed to go to the ED for further evaluation; he has also failed outpatient therapy for cellulitis; especially in the setting of type 2 diabetes it would be wise to be treated in the hospital.     Jayla Smith DO     I spent a total of 45 minutes on the date of the service which included preparing to see the patient, face-to-face patient care, completing clinical documentation, performing a medically appropriate examination, counseling and educating the patient/family/caregiver and ordering medications, tests, or procedures.

## 2024-04-19 NOTE — DISCHARGE INSTRUCTIONS
Take your antibiotics as prescribed and follow-up with the wound clinic at your scheduled appointment on Tuesday, 23 April.  For your low INR, take 10 mg of your Coumadin this evening, 7.5 mg of your Coumadin on Saturday evening, and resume your normal 5 mg dose on Sunday.  Follow-up at your next scheduled INR check.

## 2024-04-23 ENCOUNTER — OFFICE VISIT (OUTPATIENT)
Dept: WOUND CARE | Facility: CLINIC | Age: 84
End: 2024-04-23
Payer: MEDICARE

## 2024-04-23 PROCEDURE — 99203 OFFICE O/P NEW LOW 30 MIN: CPT | Performed by: CLINICAL NURSE SPECIALIST

## 2024-04-23 PROCEDURE — 11045 DBRDMT SUBQ TISS EACH ADDL: CPT | Performed by: CLINICAL NURSE SPECIALIST

## 2024-04-23 PROCEDURE — 99213 OFFICE O/P EST LOW 20 MIN: CPT | Mod: 25

## 2024-04-23 PROCEDURE — 11045 DBRDMT SUBQ TISS EACH ADDL: CPT | Mod: ZK

## 2024-04-23 PROCEDURE — 11042 DBRDMT SUBQ TIS 1ST 20SQCM/<: CPT | Performed by: CLINICAL NURSE SPECIALIST

## 2024-04-23 PROCEDURE — 11042 DBRDMT SUBQ TIS 1ST 20SQCM/<: CPT | Mod: ZK

## 2024-04-24 LAB
BACTERIA BLD CULT: NORMAL
BACTERIA BLD CULT: NORMAL

## 2024-04-25 ENCOUNTER — TELEPHONE (OUTPATIENT)
Dept: PRIMARY CARE | Facility: CLINIC | Age: 84
End: 2024-04-25

## 2024-04-25 ENCOUNTER — OFFICE VISIT (OUTPATIENT)
Dept: VASCULAR SURGERY | Facility: CLINIC | Age: 84
End: 2024-04-25
Payer: MEDICARE

## 2024-04-25 VITALS — SYSTOLIC BLOOD PRESSURE: 110 MMHG | DIASTOLIC BLOOD PRESSURE: 69 MMHG | BODY MASS INDEX: 38.17 KG/M2 | WEIGHT: 266 LBS

## 2024-04-25 DIAGNOSIS — I48.20 CHRONIC ATRIAL FIBRILLATION (MULTI): Primary | ICD-10-CM

## 2024-04-25 DIAGNOSIS — I87.2 VENOUS INSUFFICIENCY: Primary | ICD-10-CM

## 2024-04-25 PROCEDURE — 1036F TOBACCO NON-USER: CPT | Performed by: INTERNAL MEDICINE

## 2024-04-25 PROCEDURE — 99213 OFFICE O/P EST LOW 20 MIN: CPT | Performed by: INTERNAL MEDICINE

## 2024-04-25 PROCEDURE — 1159F MED LIST DOCD IN RCRD: CPT | Performed by: INTERNAL MEDICINE

## 2024-04-25 PROCEDURE — 3074F SYST BP LT 130 MM HG: CPT | Performed by: INTERNAL MEDICINE

## 2024-04-25 PROCEDURE — 1160F RVW MEDS BY RX/DR IN RCRD: CPT | Performed by: INTERNAL MEDICINE

## 2024-04-25 PROCEDURE — 3078F DIAST BP <80 MM HG: CPT | Performed by: INTERNAL MEDICINE

## 2024-04-25 ASSESSMENT — ENCOUNTER SYMPTOMS
EYES NEGATIVE: 1
ALLERGIC/IMMUNOLOGIC NEGATIVE: 1
HEMATOLOGIC/LYMPHATIC NEGATIVE: 1
MUSCULOSKELETAL NEGATIVE: 1
CONSTITUTIONAL NEGATIVE: 1
GASTROINTESTINAL NEGATIVE: 1
COLOR CHANGE: 1
NEUROLOGICAL NEGATIVE: 1
RESPIRATORY NEGATIVE: 1
PSYCHIATRIC NEGATIVE: 1
ENDOCRINE NEGATIVE: 1

## 2024-04-25 NOTE — TELEPHONE ENCOUNTER
Patient requesting a new standing order for his PT/INR for the next year to be entered in his chart. States went to the lab to have it drawn today & it had .  Please advise

## 2024-04-25 NOTE — PROGRESS NOTES
"Subjective   Patient ID: Cade Olivas \"Louise" is a 83 y.o. male who presents for Follow-up (B/l venogram With IVUS).  HPI  83 year old male with a past medical history of anemia, afib (on warfarin), obesity, cellulitis, HLD and diabetes mellitus type 2 that presents to the office for a follow up for s/p a venogram with no iliac vein stent placement per Dr. Gillespie on 4/12/24. He did go to the ED on 4/19 for worsening leg swelling/cellulitis. Today, his doing well post-op. He denies groin pain. He does have some right leg swelling/redness. However, has improved since his antibiotics from the ED. He is almost done with his antibiotics. He is going to the wound clinic once a week.       Vascular testing:  Venous insufficieny right leg 2/24: Right Lower Venous Insufficiency: Reflux is noted in the saphenofemoral junction vein. There is reflux noted in the common femoral vein. No other deep or superficial reflux is noted. The small saphenous vein arises proximal to the popliteal vein (Giacomini). No reflux is seen in the Vein of Giacomini. Right Lower Venous: No evidence of acute deep vein thrombus visualized in the right lower extremity.     CT abd/pelvis with IV contrast 2/7/23: No findings of May Garcia. No abdominal aortic aneurysm  Venous insufficiency ultrasound 12/22: Deep venous reflux bilaterally. No evidence of DVT  PVR 12/22: No evidence of artery disease bilaterally   Venous ultrasound to rule out DVT in RLE 12/22: Negative for DVT    Vascular procedures:  4/12/24: Venogram with no evidence of iliac vein stenosis therefore, no iliac stenting performed.     Review of Systems   Constitutional: Negative.    HENT: Negative.     Eyes: Negative.    Respiratory: Negative.     Cardiovascular:  Positive for leg swelling.   Gastrointestinal: Negative.    Endocrine: Negative.    Genitourinary: Negative.    Musculoskeletal: Negative.    Skin:  Positive for color change.   Allergic/Immunologic: Negative.    Neurological: " Negative.    Hematological: Negative.    Psychiatric/Behavioral: Negative.         Objective   Physical Exam  Constitutional:       Appearance: He is obese.   Eyes:      Pupils: Pupils are equal, round, and reactive to light.   Cardiovascular:      Pulses: Normal pulses.   Pulmonary:      Effort: Pulmonary effort is normal.   Abdominal:      General: Bowel sounds are normal.   Musculoskeletal:         General: Swelling present.      Cervical back: Normal range of motion.   Skin:     General: Skin is warm and dry.      Capillary Refill: Capillary refill takes less than 2 seconds.   Neurological:      General: No focal deficit present.      Mental Status: He is alert.   Psychiatric:         Mood and Affect: Mood normal.         Assessment/Plan   83 year old male with a past medical history of anemia, afib (on warfarin), obesity, cellulitis, HLD and diabetes mellitus type 2 that presents to the office for a follow up for s/p a venogram with no iliac vein stent placement per Dr. Gillespie on 4/12/24.     Plan:  Continue the rest of the antibiotics  Continue going to the wound clinic  Follow up in 3 mths  Call office if you develop worsening leg pain, swelling or develop a fever              ROULA Sol 04/25/24 1:16 PM

## 2024-04-26 ENCOUNTER — LAB (OUTPATIENT)
Dept: LAB | Facility: LAB | Age: 84
End: 2024-04-26
Payer: MEDICARE

## 2024-04-26 DIAGNOSIS — I48.20 CHRONIC ATRIAL FIBRILLATION (MULTI): ICD-10-CM

## 2024-04-26 LAB
INR PPP: 2.5 (ref 0.9–1.1)
PROTHROMBIN TIME: 28.1 SECONDS (ref 9.8–12.8)

## 2024-04-26 PROCEDURE — 36415 COLL VENOUS BLD VENIPUNCTURE: CPT

## 2024-04-26 PROCEDURE — 85610 PROTHROMBIN TIME: CPT

## 2024-04-30 ENCOUNTER — OFFICE VISIT (OUTPATIENT)
Dept: WOUND CARE | Facility: CLINIC | Age: 84
End: 2024-04-30
Payer: MEDICARE

## 2024-04-30 PROCEDURE — 99212 OFFICE O/P EST SF 10 MIN: CPT

## 2024-04-30 PROCEDURE — 99212 OFFICE O/P EST SF 10 MIN: CPT | Performed by: CLINICAL NURSE SPECIALIST

## 2024-05-07 ENCOUNTER — APPOINTMENT (OUTPATIENT)
Dept: WOUND CARE | Facility: CLINIC | Age: 84
End: 2024-05-07
Payer: MEDICARE

## 2024-05-13 ENCOUNTER — OFFICE VISIT (OUTPATIENT)
Dept: PRIMARY CARE | Facility: CLINIC | Age: 84
End: 2024-05-13
Payer: MEDICARE

## 2024-05-13 VITALS — DIASTOLIC BLOOD PRESSURE: 60 MMHG | SYSTOLIC BLOOD PRESSURE: 140 MMHG | TEMPERATURE: 97.6 F

## 2024-05-13 DIAGNOSIS — S81.801D WOUND OF RIGHT LOWER EXTREMITY, SUBSEQUENT ENCOUNTER: ICD-10-CM

## 2024-05-13 DIAGNOSIS — L03.115 CELLULITIS OF RIGHT LEG: Primary | ICD-10-CM

## 2024-05-13 DIAGNOSIS — L03.115 CELLULITIS OF RIGHT LOWER EXTREMITY: ICD-10-CM

## 2024-05-13 DIAGNOSIS — I48.20 CHRONIC ATRIAL FIBRILLATION (MULTI): ICD-10-CM

## 2024-05-13 PROCEDURE — 3077F SYST BP >= 140 MM HG: CPT | Performed by: FAMILY MEDICINE

## 2024-05-13 PROCEDURE — 3078F DIAST BP <80 MM HG: CPT | Performed by: FAMILY MEDICINE

## 2024-05-13 PROCEDURE — 1160F RVW MEDS BY RX/DR IN RCRD: CPT | Performed by: FAMILY MEDICINE

## 2024-05-13 PROCEDURE — 1159F MED LIST DOCD IN RCRD: CPT | Performed by: FAMILY MEDICINE

## 2024-05-13 PROCEDURE — 1036F TOBACCO NON-USER: CPT | Performed by: FAMILY MEDICINE

## 2024-05-13 PROCEDURE — 99214 OFFICE O/P EST MOD 30 MIN: CPT | Performed by: FAMILY MEDICINE

## 2024-05-13 RX ORDER — FUROSEMIDE 40 MG/1
40 TABLET ORAL DAILY
Qty: 30 TABLET | Refills: 1 | Status: SHIPPED | OUTPATIENT
Start: 2024-05-13 | End: 2024-05-29 | Stop reason: SDUPTHER

## 2024-05-13 RX ORDER — SULFAMETHOXAZOLE AND TRIMETHOPRIM 800; 160 MG/1; MG/1
1 TABLET ORAL 2 TIMES DAILY
Qty: 14 TABLET | Refills: 0 | Status: SHIPPED | OUTPATIENT
Start: 2024-05-13 | End: 2024-05-20

## 2024-05-13 RX ORDER — CEPHALEXIN 500 MG/1
500 CAPSULE ORAL 4 TIMES DAILY
Qty: 28 CAPSULE | Refills: 0 | Status: SHIPPED | OUTPATIENT
Start: 2024-05-13 | End: 2024-05-20

## 2024-05-14 NOTE — PROGRESS NOTES
"Subjective   Patient ID: Cade Olivas \"Louise" is a 84 y.o. male who presents for Cellulitis.    HPI   Patient here for complaining of cellulitis of his right lower leg  He has had significant issues with this over the last month requiring multiple antibiotics, Lasix and an ER visit  He was also recommended vascular surgery and wound clinic  Recent notes reviewed  He states that about a week ago he noticed slight redness and punctum in the anterior shin  In the last 24 hours redness and tenderness have spread and he has developed a few blisters he has not been taking LASEK as he ran out of his prescription most recently effective antibiotics with Keflex and Bactrim together  He is not having any fevers, chills, nausea, vomiting, headaches, dizziness, myalgias    Review of Systems    Objective   /60   Temp 36.4 °C (97.6 °F)     Physical Exam  Constitutional:       Appearance: Normal appearance. He is obese.   Cardiovascular:      Rate and Rhythm: Normal rate.   Pulmonary:      Effort: Pulmonary effort is normal.   Skin:         Neurological:      Mental Status: He is alert.         Assessment/Plan   1. Wound of right lower extremity, subsequent encounter  2. Cellulitis of right lower extremity  Discussed with patient restarting antibiotics that were effective previously  Area of cellulitis was outlined and dated  He is not currently in wound care although I think that he would benefit from continuing to follow there  We will need to restart Lasix to get the swelling down which is likely contributing to the cellulitis-will do 40 mg once a day  Will need to check INR due to antibiotics in about 48 hours  He will call with any new or worsening symptoms  Will plan to see him back in 5 to 7 days unless concerns sooner  - furosemide (Lasix) 40 mg tablet; Take 1 tablet (40 mg) by mouth once daily.  Dispense: 30 tablet; Refill: 1  - sulfamethoxazole-trimethoprim (Bactrim DS) 800-160 mg tablet; Take 1 tablet by mouth " 2 times a day for 7 days.  Dispense: 14 tablet; Refill: 0  - cephalexin (Keflex) 500 mg capsule; Take 1 capsule (500 mg) by mouth 4 times a day for 7 days.  Dispense: 28 capsule; Refill: 0  - Comprehensive Metabolic Panel; Future  - Protime-INR; Future  - CBC and Auto Differential; Future          Ivan Parrish, DO

## 2024-05-15 ENCOUNTER — LAB (OUTPATIENT)
Dept: LAB | Facility: LAB | Age: 84
End: 2024-05-15
Payer: MEDICARE

## 2024-05-15 ENCOUNTER — APPOINTMENT (OUTPATIENT)
Dept: PRIMARY CARE | Facility: CLINIC | Age: 84
End: 2024-05-15
Payer: MEDICARE

## 2024-05-15 DIAGNOSIS — I48.20 CHRONIC ATRIAL FIBRILLATION (MULTI): ICD-10-CM

## 2024-05-15 DIAGNOSIS — L03.115 CELLULITIS OF RIGHT LEG: ICD-10-CM

## 2024-05-15 DIAGNOSIS — L03.115 CELLULITIS OF RIGHT LOWER EXTREMITY: ICD-10-CM

## 2024-05-15 DIAGNOSIS — S81.801D WOUND OF RIGHT LOWER EXTREMITY, SUBSEQUENT ENCOUNTER: ICD-10-CM

## 2024-05-15 LAB
ALBUMIN SERPL BCP-MCNC: 4.1 G/DL (ref 3.4–5)
ALP SERPL-CCNC: 67 U/L (ref 33–136)
ALT SERPL W P-5'-P-CCNC: 21 U/L (ref 10–52)
ANION GAP SERPL CALC-SCNC: 13 MMOL/L (ref 10–20)
AST SERPL W P-5'-P-CCNC: 20 U/L (ref 9–39)
BASOPHILS # BLD AUTO: 0.04 X10*3/UL (ref 0–0.1)
BASOPHILS NFR BLD AUTO: 0.6 %
BILIRUB SERPL-MCNC: 0.7 MG/DL (ref 0–1.2)
BUN SERPL-MCNC: 21 MG/DL (ref 6–23)
CALCIUM SERPL-MCNC: 9.1 MG/DL (ref 8.6–10.3)
CHLORIDE SERPL-SCNC: 105 MMOL/L (ref 98–107)
CO2 SERPL-SCNC: 27 MMOL/L (ref 21–32)
CREAT SERPL-MCNC: 1.29 MG/DL (ref 0.5–1.3)
EGFRCR SERPLBLD CKD-EPI 2021: 55 ML/MIN/1.73M*2
EOSINOPHIL # BLD AUTO: 0.11 X10*3/UL (ref 0–0.4)
EOSINOPHIL NFR BLD AUTO: 1.7 %
ERYTHROCYTE [DISTWIDTH] IN BLOOD BY AUTOMATED COUNT: 15.8 % (ref 11.5–14.5)
GLUCOSE SERPL-MCNC: 98 MG/DL (ref 74–99)
HCT VFR BLD AUTO: 33.9 % (ref 41–52)
HGB BLD-MCNC: 11.1 G/DL (ref 13.5–17.5)
IMM GRANULOCYTES # BLD AUTO: 0.01 X10*3/UL (ref 0–0.5)
IMM GRANULOCYTES NFR BLD AUTO: 0.2 % (ref 0–0.9)
INR PPP: 1.8 (ref 0.9–1.1)
LYMPHOCYTES # BLD AUTO: 1.46 X10*3/UL (ref 0.8–3)
LYMPHOCYTES NFR BLD AUTO: 22.6 %
MCH RBC QN AUTO: 30.1 PG (ref 26–34)
MCHC RBC AUTO-ENTMCNC: 32.7 G/DL (ref 32–36)
MCV RBC AUTO: 92 FL (ref 80–100)
MONOCYTES # BLD AUTO: 0.66 X10*3/UL (ref 0.05–0.8)
MONOCYTES NFR BLD AUTO: 10.2 %
NEUTROPHILS # BLD AUTO: 4.18 X10*3/UL (ref 1.6–5.5)
NEUTROPHILS NFR BLD AUTO: 64.7 %
NRBC BLD-RTO: 0 /100 WBCS (ref 0–0)
PLATELET # BLD AUTO: 199 X10*3/UL (ref 150–450)
POTASSIUM SERPL-SCNC: 5.2 MMOL/L (ref 3.5–5.3)
PROT SERPL-MCNC: 6.7 G/DL (ref 6.4–8.2)
PROTHROMBIN TIME: 20.2 SECONDS (ref 9.8–12.8)
RBC # BLD AUTO: 3.69 X10*6/UL (ref 4.5–5.9)
SODIUM SERPL-SCNC: 140 MMOL/L (ref 136–145)
WBC # BLD AUTO: 6.5 X10*3/UL (ref 4.4–11.3)

## 2024-05-15 PROCEDURE — 85610 PROTHROMBIN TIME: CPT

## 2024-05-15 PROCEDURE — 80053 COMPREHEN METABOLIC PANEL: CPT

## 2024-05-15 PROCEDURE — 85025 COMPLETE CBC W/AUTO DIFF WBC: CPT

## 2024-05-15 PROCEDURE — 36415 COLL VENOUS BLD VENIPUNCTURE: CPT

## 2024-05-17 ENCOUNTER — TELEPHONE (OUTPATIENT)
Dept: PRIMARY CARE | Facility: CLINIC | Age: 84
End: 2024-05-17
Payer: MEDICARE

## 2024-05-17 NOTE — TELEPHONE ENCOUNTER
----- Message from Ivan Parrish DO sent at 5/17/2024  6:38 AM EDT -----  Please call and see if patient cellulitis is improving.  His labs are overall stable.  Plan to recheck INR Tuesday or Wednesday next week  ##DO NOT CLOSE UNTIL YOU SPEAK TO PATIENT##

## 2024-05-20 ENCOUNTER — OFFICE VISIT (OUTPATIENT)
Dept: PRIMARY CARE | Facility: CLINIC | Age: 84
End: 2024-05-20
Payer: MEDICARE

## 2024-05-20 VITALS — DIASTOLIC BLOOD PRESSURE: 60 MMHG | SYSTOLIC BLOOD PRESSURE: 120 MMHG

## 2024-05-20 DIAGNOSIS — L03.115 CELLULITIS OF RIGHT LEG: Primary | ICD-10-CM

## 2024-05-20 DIAGNOSIS — I48.20 CHRONIC ATRIAL FIBRILLATION (MULTI): ICD-10-CM

## 2024-05-20 PROCEDURE — 3074F SYST BP LT 130 MM HG: CPT | Performed by: FAMILY MEDICINE

## 2024-05-20 PROCEDURE — 1036F TOBACCO NON-USER: CPT | Performed by: FAMILY MEDICINE

## 2024-05-20 PROCEDURE — 3078F DIAST BP <80 MM HG: CPT | Performed by: FAMILY MEDICINE

## 2024-05-20 PROCEDURE — 99214 OFFICE O/P EST MOD 30 MIN: CPT | Performed by: FAMILY MEDICINE

## 2024-05-20 PROCEDURE — 1160F RVW MEDS BY RX/DR IN RCRD: CPT | Performed by: FAMILY MEDICINE

## 2024-05-20 PROCEDURE — 1159F MED LIST DOCD IN RCRD: CPT | Performed by: FAMILY MEDICINE

## 2024-05-20 RX ORDER — AMOXICILLIN 500 MG/1
CAPSULE ORAL
COMMUNITY
Start: 2024-05-13

## 2024-05-20 NOTE — PATIENT INSTRUCTIONS
Please increase Lasix 40 mg to 2 times a day for the next 48 hours  Please call wound care ASAP and get set up for a visit hopefully in the next 48 hours  Please increase your water intake as you are able  Please get blood work done Wednesday morning  If you develop fevers, increased redness or swelling please go to the emergency room  I will call you on Wednesday and follow-up with how you are doing

## 2024-05-20 NOTE — PROGRESS NOTES
"Subjective   Patient ID: Cade Olivas \"Louise" is a 84 y.o. male who presents for Follow-up (Cellulitis recheck).    HPI   Seen today for follow-up  Been taking Keflex and Bactrim as directed.  Last dose is today  Has been taking Lasix 40 mg once a day.  He does feel swelling in his leg is a little bit better but not nearly as good as it was when he was taking Lasix twice daily  He does feel the redness has slightly improved but not resolved  Blisters have opened but no other new wounds  He continues to overall feel well without fevers, chills, nausea, vomiting, headache, dizziness    Review of Systems  As noted HPI otherwise 10 point review of systems negative  Objective   /60     Physical Exam  Constitutional:       Appearance: Normal appearance. He is obese.   Cardiovascular:      Rate and Rhythm: Normal rate.      Pulses:           Dorsalis pedis pulses are 1+ on the right side and 1+ on the left side.        Posterior tibial pulses are 1+ on the right side and 1+ on the left side.   Pulmonary:      Effort: Pulmonary effort is normal.   Musculoskeletal:      Right lower le+ Edema present.      Left lower le+ Edema present.   Skin:         Neurological:      Mental Status: He is alert.         Assessment/Plan   1. Cellulitis of right leg  Discussed with patient I believe the cellulitis has significantly improved with antibiotics  He still has some swelling so we will increase his Lasix to 40 mg twice daily for the next 48 hours and then he will either send us a photo or we will check patient on the phone to see how doing  Need to protect kidneys still will do this gently and hold off on other antibiotics at this point  We will call over to wound care to get him seen ASAP as well  - Comprehensive Metabolic Panel; Future  - CBC and Auto Differential; Future  - Protime-INR; Future    2. Chronic atrial fibrillation (Multi)  Check 48 hrs  - Protime-INR; Future     Ed if worse   Patient verbalized " understanding of plan of care and all questions were answered.     Ivan Parrish, DO

## 2024-05-21 ENCOUNTER — OFFICE VISIT (OUTPATIENT)
Dept: WOUND CARE | Facility: CLINIC | Age: 84
End: 2024-05-21
Payer: MEDICARE

## 2024-05-21 PROCEDURE — 11045 DBRDMT SUBQ TISS EACH ADDL: CPT

## 2024-05-21 PROCEDURE — 11042 DBRDMT SUBQ TIS 1ST 20SQCM/<: CPT

## 2024-05-22 ENCOUNTER — TELEPHONE (OUTPATIENT)
Dept: PRIMARY CARE | Facility: CLINIC | Age: 84
End: 2024-05-22

## 2024-05-22 ENCOUNTER — LAB (OUTPATIENT)
Dept: LAB | Facility: LAB | Age: 84
End: 2024-05-22
Payer: MEDICARE

## 2024-05-22 DIAGNOSIS — I48.20 CHRONIC ATRIAL FIBRILLATION (MULTI): ICD-10-CM

## 2024-05-22 DIAGNOSIS — L03.115 CELLULITIS OF RIGHT LEG: Primary | ICD-10-CM

## 2024-05-22 DIAGNOSIS — L03.115 CELLULITIS OF RIGHT LEG: ICD-10-CM

## 2024-05-22 LAB
ALBUMIN SERPL BCP-MCNC: 4.1 G/DL (ref 3.4–5)
ALP SERPL-CCNC: 67 U/L (ref 33–136)
ALT SERPL W P-5'-P-CCNC: 24 U/L (ref 10–52)
ANION GAP SERPL CALC-SCNC: 13 MMOL/L (ref 10–20)
AST SERPL W P-5'-P-CCNC: 21 U/L (ref 9–39)
BASOPHILS # BLD AUTO: 0.05 X10*3/UL (ref 0–0.1)
BASOPHILS NFR BLD AUTO: 0.7 %
BILIRUB SERPL-MCNC: 0.7 MG/DL (ref 0–1.2)
BUN SERPL-MCNC: 33 MG/DL (ref 6–23)
CALCIUM SERPL-MCNC: 9.2 MG/DL (ref 8.6–10.3)
CHLORIDE SERPL-SCNC: 104 MMOL/L (ref 98–107)
CO2 SERPL-SCNC: 27 MMOL/L (ref 21–32)
CREAT SERPL-MCNC: 1.28 MG/DL (ref 0.5–1.3)
EGFRCR SERPLBLD CKD-EPI 2021: 55 ML/MIN/1.73M*2
EOSINOPHIL # BLD AUTO: 0.31 X10*3/UL (ref 0–0.4)
EOSINOPHIL NFR BLD AUTO: 4.6 %
ERYTHROCYTE [DISTWIDTH] IN BLOOD BY AUTOMATED COUNT: 16 % (ref 11.5–14.5)
GLUCOSE SERPL-MCNC: 169 MG/DL (ref 74–99)
HCT VFR BLD AUTO: 35.5 % (ref 41–52)
HGB BLD-MCNC: 11.5 G/DL (ref 13.5–17.5)
IMM GRANULOCYTES # BLD AUTO: 0.02 X10*3/UL (ref 0–0.5)
IMM GRANULOCYTES NFR BLD AUTO: 0.3 % (ref 0–0.9)
INR PPP: 2.7 (ref 0.9–1.1)
LYMPHOCYTES # BLD AUTO: 1.91 X10*3/UL (ref 0.8–3)
LYMPHOCYTES NFR BLD AUTO: 28.5 %
MCH RBC QN AUTO: 30.4 PG (ref 26–34)
MCHC RBC AUTO-ENTMCNC: 32.4 G/DL (ref 32–36)
MCV RBC AUTO: 94 FL (ref 80–100)
MONOCYTES # BLD AUTO: 0.73 X10*3/UL (ref 0.05–0.8)
MONOCYTES NFR BLD AUTO: 10.9 %
NEUTROPHILS # BLD AUTO: 3.68 X10*3/UL (ref 1.6–5.5)
NEUTROPHILS NFR BLD AUTO: 55 %
NRBC BLD-RTO: 0 /100 WBCS (ref 0–0)
PLATELET # BLD AUTO: 213 X10*3/UL (ref 150–450)
POTASSIUM SERPL-SCNC: 4.7 MMOL/L (ref 3.5–5.3)
PROT SERPL-MCNC: 6.6 G/DL (ref 6.4–8.2)
PROTHROMBIN TIME: 30.9 SECONDS (ref 9.8–12.8)
RBC # BLD AUTO: 3.78 X10*6/UL (ref 4.5–5.9)
SODIUM SERPL-SCNC: 139 MMOL/L (ref 136–145)
WBC # BLD AUTO: 6.7 X10*3/UL (ref 4.4–11.3)

## 2024-05-22 PROCEDURE — 36415 COLL VENOUS BLD VENIPUNCTURE: CPT

## 2024-05-22 PROCEDURE — 85610 PROTHROMBIN TIME: CPT

## 2024-05-22 PROCEDURE — 85025 COMPLETE CBC W/AUTO DIFF WBC: CPT

## 2024-05-22 PROCEDURE — 80053 COMPREHEN METABOLIC PANEL: CPT

## 2024-05-22 NOTE — TELEPHONE ENCOUNTER
Called patient to discuss results and see how his leg is doing  He was seen by wound clinic yesterday and given ointment  He states his swelling and redness are continuing to improve despite being done with antibiotics for about 48 hours at this point  Reviewed labs with him  Kidney function is stable we will plan to recheck this in 1 week  INR within range plan to recheck this in 1 week  Blood counts stable recheck 1 week  He will call with any new or worsening symptoms    Patient verbalized understanding of plan of care and all questions were answered.

## 2024-05-28 ENCOUNTER — TELEPHONE (OUTPATIENT)
Dept: UROLOGY | Facility: CLINIC | Age: 84
End: 2024-05-28
Payer: MEDICARE

## 2024-05-28 ENCOUNTER — OFFICE VISIT (OUTPATIENT)
Dept: WOUND CARE | Facility: CLINIC | Age: 84
End: 2024-05-28
Payer: MEDICARE

## 2024-05-28 DIAGNOSIS — C61 PROSTATE CANCER (MULTI): Primary | ICD-10-CM

## 2024-05-28 PROCEDURE — 11042 DBRDMT SUBQ TIS 1ST 20SQCM/<: CPT

## 2024-05-28 RX ORDER — BICALUTAMIDE 50 MG/1
50 TABLET, FILM COATED ORAL DAILY
Qty: 14 TABLET | Refills: 0 | Status: SHIPPED | OUTPATIENT
Start: 2024-05-28 | End: 2025-05-28

## 2024-05-28 NOTE — TELEPHONE ENCOUNTER
----- Message from Hood Gillespie DO sent at 3/18/2024  9:16 PM EDT -----  Regarding: RE: Venogram  opened  ----- Message -----  From: Marianna Milian LPN  Sent: 3/15/2024  11:15 AM EDT  To: Hood Gillespie DO; Marianna Milian LPN  Subject: FW: Venogram                                     Please start case for 4/12/24    Comments    2/22/24 CLEARANCE FAXED TO DR. PARRISH    Received clearance 3/14/24  ----- Message -----  From: Marianna Milian LPN  Sent: 2/22/2024  11:05 AM EDT  To: Marianna Milian LPN  Subject: Venogram                                         Needs clearance from Dr. Parrish       Comments    PROCEDURE 4/12/24   FUV 4/25/24 1:30      friend

## 2024-05-28 NOTE — TELEPHONE ENCOUNTER
Pt calling in needing a prescription of Casodex 50mg to take 7 days prior to the lupron injection and 7 days after. (Pt is aware it will be in after 6pm Thursday)

## 2024-05-29 ENCOUNTER — TELEPHONE (OUTPATIENT)
Dept: PRIMARY CARE | Facility: CLINIC | Age: 84
End: 2024-05-29

## 2024-05-29 ENCOUNTER — LAB (OUTPATIENT)
Dept: LAB | Facility: LAB | Age: 84
End: 2024-05-29
Payer: MEDICARE

## 2024-05-29 DIAGNOSIS — L03.115 CELLULITIS OF RIGHT LEG: ICD-10-CM

## 2024-05-29 DIAGNOSIS — S81.801D WOUND OF RIGHT LOWER EXTREMITY, SUBSEQUENT ENCOUNTER: ICD-10-CM

## 2024-05-29 DIAGNOSIS — I48.20 CHRONIC ATRIAL FIBRILLATION (MULTI): ICD-10-CM

## 2024-05-29 DIAGNOSIS — L03.115 CELLULITIS OF RIGHT LOWER EXTREMITY: ICD-10-CM

## 2024-05-29 LAB
ANION GAP SERPL CALC-SCNC: 10 MMOL/L (ref 10–20)
BASOPHILS # BLD AUTO: 0.04 X10*3/UL (ref 0–0.1)
BASOPHILS NFR BLD AUTO: 0.6 %
BUN SERPL-MCNC: 21 MG/DL (ref 6–23)
CALCIUM SERPL-MCNC: 9.2 MG/DL (ref 8.6–10.3)
CHLORIDE SERPL-SCNC: 107 MMOL/L (ref 98–107)
CO2 SERPL-SCNC: 29 MMOL/L (ref 21–32)
CREAT SERPL-MCNC: 0.88 MG/DL (ref 0.5–1.3)
EGFRCR SERPLBLD CKD-EPI 2021: 85 ML/MIN/1.73M*2
EOSINOPHIL # BLD AUTO: 0.18 X10*3/UL (ref 0–0.4)
EOSINOPHIL NFR BLD AUTO: 2.8 %
ERYTHROCYTE [DISTWIDTH] IN BLOOD BY AUTOMATED COUNT: 15.7 % (ref 11.5–14.5)
GLUCOSE SERPL-MCNC: 132 MG/DL (ref 74–99)
HCT VFR BLD AUTO: 35 % (ref 41–52)
HGB BLD-MCNC: 11.6 G/DL (ref 13.5–17.5)
IMM GRANULOCYTES # BLD AUTO: 0.02 X10*3/UL (ref 0–0.5)
IMM GRANULOCYTES NFR BLD AUTO: 0.3 % (ref 0–0.9)
INR PPP: 1.9 (ref 0.9–1.1)
LYMPHOCYTES # BLD AUTO: 1.73 X10*3/UL (ref 0.8–3)
LYMPHOCYTES NFR BLD AUTO: 26.9 %
MCH RBC QN AUTO: 30.7 PG (ref 26–34)
MCHC RBC AUTO-ENTMCNC: 33.1 G/DL (ref 32–36)
MCV RBC AUTO: 93 FL (ref 80–100)
MONOCYTES # BLD AUTO: 0.69 X10*3/UL (ref 0.05–0.8)
MONOCYTES NFR BLD AUTO: 10.7 %
NEUTROPHILS # BLD AUTO: 3.76 X10*3/UL (ref 1.6–5.5)
NEUTROPHILS NFR BLD AUTO: 58.7 %
NRBC BLD-RTO: 0 /100 WBCS (ref 0–0)
PLATELET # BLD AUTO: 203 X10*3/UL (ref 150–450)
POTASSIUM SERPL-SCNC: 5.1 MMOL/L (ref 3.5–5.3)
PROTHROMBIN TIME: 21.5 SECONDS (ref 9.8–12.8)
RBC # BLD AUTO: 3.78 X10*6/UL (ref 4.5–5.9)
SODIUM SERPL-SCNC: 141 MMOL/L (ref 136–145)
WBC # BLD AUTO: 6.4 X10*3/UL (ref 4.4–11.3)

## 2024-05-29 PROCEDURE — 36415 COLL VENOUS BLD VENIPUNCTURE: CPT

## 2024-05-29 PROCEDURE — 80048 BASIC METABOLIC PNL TOTAL CA: CPT

## 2024-05-29 PROCEDURE — 85025 COMPLETE CBC W/AUTO DIFF WBC: CPT

## 2024-05-29 PROCEDURE — 85610 PROTHROMBIN TIME: CPT

## 2024-05-29 RX ORDER — FUROSEMIDE 40 MG/1
40 TABLET ORAL DAILY
Qty: 30 TABLET | Refills: 1 | Status: SHIPPED | OUTPATIENT
Start: 2024-05-29 | End: 2024-07-28

## 2024-05-29 NOTE — TELEPHONE ENCOUNTER
Spoke with patient, he verbalized understanding.    Blisters are healing but leg is still red and slightly swollen still. Patient goes back to wound clinic next Tuesday. He has enough of the water pill until next week.

## 2024-05-29 NOTE — TELEPHONE ENCOUNTER
----- Message from Ivan Parrish DO sent at 5/29/2024  2:23 PM EDT -----  Let pt know kidney is improving nicely.  How has his leg doing?  No change on Coumadin dosing and recheck 1 week    ##DO NOT CLOSE UNTIL YOU SPEAK TO PATIENT##

## 2024-05-31 NOTE — TELEPHONE ENCOUNTER
Attempted to call patient, voicemail not set up. I did send a Vigstert message requested patient contact you after seeing wound clinic

## 2024-06-04 ENCOUNTER — OFFICE VISIT (OUTPATIENT)
Dept: WOUND CARE | Facility: CLINIC | Age: 84
End: 2024-06-04
Payer: MEDICARE

## 2024-06-04 ENCOUNTER — LAB REQUISITION (OUTPATIENT)
Dept: LAB | Facility: HOSPITAL | Age: 84
End: 2024-06-04
Payer: MEDICARE

## 2024-06-04 DIAGNOSIS — S81.801S UNSPECIFIED OPEN WOUND, RIGHT LOWER LEG, SEQUELA: ICD-10-CM

## 2024-06-04 PROCEDURE — 99213 OFFICE O/P EST LOW 20 MIN: CPT

## 2024-06-04 PROCEDURE — 87070 CULTURE OTHR SPECIMN AEROBIC: CPT | Mod: OUT,PORLAB

## 2024-06-06 LAB
BACTERIA SPEC CULT: NORMAL
GRAM STN SPEC: NORMAL
GRAM STN SPEC: NORMAL

## 2024-06-10 ENCOUNTER — OFFICE VISIT (OUTPATIENT)
Dept: UROLOGY | Facility: CLINIC | Age: 84
End: 2024-06-10
Payer: MEDICARE

## 2024-06-10 ENCOUNTER — LAB (OUTPATIENT)
Dept: LAB | Facility: LAB | Age: 84
End: 2024-06-10
Payer: MEDICARE

## 2024-06-10 DIAGNOSIS — N40.0 BENIGN PROSTATIC HYPERPLASIA, UNSPECIFIED WHETHER LOWER URINARY TRACT SYMPTOMS PRESENT: Primary | ICD-10-CM

## 2024-06-10 DIAGNOSIS — C61 PROSTATE CANCER (MULTI): ICD-10-CM

## 2024-06-10 DIAGNOSIS — I48.20 CHRONIC ATRIAL FIBRILLATION (MULTI): ICD-10-CM

## 2024-06-10 LAB
INR PPP: 1.8 (ref 0.9–1.1)
POC BILIRUBIN, URINE: NEGATIVE
POC BLOOD, URINE: NEGATIVE
POC GLUCOSE, URINE: NEGATIVE MG/DL
POC KETONES, URINE: NEGATIVE MG/DL
POC LEUKOCYTES, URINE: NEGATIVE
POC NITRITE,URINE: NEGATIVE
POC PH, URINE: 5.5 PH
POC PROTEIN, URINE: NEGATIVE MG/DL
POC SPECIFIC GRAVITY, URINE: 1.02
POC UROBILINOGEN, URINE: 0.2 EU/DL
PROTHROMBIN TIME: 19.9 SECONDS (ref 9.8–12.8)
PSA SERPL-MCNC: 0.07 NG/ML

## 2024-06-10 PROCEDURE — 84153 ASSAY OF PSA TOTAL: CPT

## 2024-06-10 PROCEDURE — 1159F MED LIST DOCD IN RCRD: CPT | Performed by: UROLOGY

## 2024-06-10 PROCEDURE — 85610 PROTHROMBIN TIME: CPT

## 2024-06-10 PROCEDURE — 96402 CHEMO HORMON ANTINEOPL SQ/IM: CPT | Performed by: UROLOGY

## 2024-06-10 PROCEDURE — 36415 COLL VENOUS BLD VENIPUNCTURE: CPT

## 2024-06-10 PROCEDURE — 99213 OFFICE O/P EST LOW 20 MIN: CPT | Performed by: UROLOGY

## 2024-06-10 PROCEDURE — 81003 URINALYSIS AUTO W/O SCOPE: CPT | Performed by: UROLOGY

## 2024-06-10 RX ORDER — TERAZOSIN 5 MG/1
10 CAPSULE ORAL NIGHTLY
Qty: 90 CAPSULE | Refills: 3 | Status: SHIPPED | OUTPATIENT
Start: 2024-06-10 | End: 2024-12-07

## 2024-06-10 NOTE — PROGRESS NOTES
06/10/2024  Prostate cancer status post IMRT rising PSA and for second Lupron shot    No hot flash    We discussed continue Lupron injection  We discussed PSA checked every 6 months  All the questions were answered, the patient expressed understanding and agreed to the plan.      Pt given Lupron 45 mg in right hip with no reactions.  Tpotts NR-CMA    Impression  UTI  Dysuria  Prostate cancer [place] 7, status post IMRT  Rising PSA  BPH  ED     Plan  Continue Lupron injection every 6 months  Liu continue terazosin to 10 mg daily  PSA now and in 6 months    Chief Complaint   Patient presents with    Prostate Cancer     Patient here for his second 6 month Lupron injection. He is voiding well.         Physical Exam     TODAYS LAB RESULTS:    No recent PSA.    POC Glucose, Urine  NEGATIVE mg/dl NEGATIVE   POC Bilirubin, Urine  NEGATIVE NEGATIVE   POC Ketones, Urine  NEGATIVE mg/dl NEGATIVE   POC Specific Gravity, Urine  1.005 - 1.035 1.020   POC Blood, Urine  NEGATIVE NEGATIVE   POC PH, Urine  No Reference Range Established PH 5.5   POC Protein, Urine  NEGATIVE, 30 (1+) mg/dl NEGATIVE   POC Urobilinogen, Urine  0.2, 1.0 EU/DL 0.2   Poc Nitrite, Urine  NEGATIVE NEGATIVE   POC Leukocytes, Urine  NEGATIVE NEGATIVE     ASSESSMENT&PLAN:      IMPRESSIONS:     12/21/2023  Patient here for first Lupron injection.  Started Casodex 7 days ago     Patient started Hytrin 10 mg daily, working better     We discussed prostate cancer status post IMRT, rising PSA  We discussed the Lupron injection Casodex, indication risk-benefit and alternative  We discussed UTI urine culture  We discussed BPH, increased terazosin to 10 mg daily  All the questions were answered, the patient expressed understanding and agreed to the plan.     Impression  UTI  Dysuria  Prostate cancer [place] 7, status post IMRT  Rising PSA  BPH  ED     Plan  Lupron injection today  Finish Casodex 50 mg daily for 2 weeks,   Liu continue terazosin to 10 mg daily  Will  see patient at second Lupron injection appointment        Patient here today for Lupron injection. Lupron injection prepared per manufacturers instructions. Patient's skin prepped with alcohol and administered Lupron 45mg IM into LT dorsal glut per MELVIN Campos. Patient advised to report swelling, pain, and or any issues with injection site. Patient is to return in months for next injection.  Audelia Torres LPN                Chief Complaint   Patient presents with    Prostate Cancer       Patient here for first Lupron injection. Patient took Casodex 50 mg for 7 days before this appointment, as instructed.          Physical Exam      TODAYS LAB RESULTS:        ASSESSMENT&PLAN:        IMPRESSIONS:      12/04/2023  Complaining UTI episodes, dysuria finished antibiotics.     Patient has no nausea, no vomiting, no fever.     NATI: Deferred     PSA rising     We discussed prostate cancer status post IMRT, rising PSA  We discussed the Lupron injection Casodex, indication risk-benefit and alternative  We discussed UTI urine culture  We discussed BPH, increased terazosin to 10 mg daily  All the questions were answered, the patient expressed understanding and agreed to the plan.     Impression  UTI  Dysuria  Prostate cancer [place] 7, status post IMRT  Rising PSA  BPH  ED     Plan  Lupron injection month for 2 years  Casodex 50 mg daily for 2 weeks, start 7 days before first Lupron  Urine culture  Change terazosin to 10 mg daily  Will see patient a first Lupron injection appointment             Chief Complaint   Patient presents with    Benign Prostatic Hypertrophy       Voiding well.     Prostate Cancer       Denies weight changes and bone pain.     Recurrent UTI       Patient claims he has had 5 UTIs since August.          Physical Exam      TODAYS LAB RESULTS:     PSA 11/01/2023  2.74  PSA 08/02/2023  2.31     POC Glucose, Urine  NEGATIVE mg/dl NEGATIVE     POC Bilirubin, Urine  NEGATIVE SMALL (1+) Abnormal      POC  Ketones, Urine  NEGATIVE mg/dl TRACE Abnormal      POC Specific Gravity, Urine  1.005 - 1.035 1.025     POC Blood, Urine  NEGATIVE NEGATIVE     POC PH, Urine  No Reference Range Established PH 5.5     POC Protein, Urine  NEGATIVE, 30 (1+) mg/dl TRACE Abnormal      POC Urobilinogen, Urine  0.2, 1.0 EU/DL 1.0     Poc Nitrite, Urine  NEGATIVE NEGATIVE     POC Leukocytes, Urine  NEGATIVE TRACE Abnormal         ASSESSMENT&PLAN:        IMPRESSIONS:     05/19/2023  Voiding well, no weight loss no bone pain no poor appetite     Patient has no nausea, no vomiting, no fever.     NATI: Deferred     PSA slowly rising 1.81     Patient here for 6 month follow up for prostate Ca, BPH and ED   Last PSA done 04/27/23 1.81  Previous PSA done 02/01/23 1.69  He does get 2-3 times a night. He does have frequency during the day. Complains of urgency ad weak stream. He does not feel that he empties bladder well.   PVR: 43ml --Argelia     IO Glucose - Urine Negative REQUIRED    IO Bilirubin Negative REQUIRED    IO Ketones Negative REQUIRED    IO Specific Gravity 1.020 REQUIRED    IO Blood Negative REQUIRED    IO pH 5.5 REQUIRED    IO Protein, Urine Negative REQUIRED    IO Urobilinogen Normal (0.2-1.0 mg/dl) REQUIRED    IO Nitrite, Urine Negative REQUIRED    IO Leukocytes Negative      We discussed the prostate cancer status post IMRT, rising PSA, continue monitoring PSA and possible Lupron injection if PSA rising above 2.0;  We discussed erectile dysfunction, failed medications, possible penile injection  All the questions were answered, the patient expressed understanding and agreed to the plan.     Impression  Prostate cancer [place] 7, status post IMRT  Rising PSA  BPH  ED     Plan  PSA in 3 months, 6 months  Appointment in 6 months  Possible Lupron injection in the future        11/18/2022  83-year-old gentleman history of prostate cancer [place] 7, status post IMRT, slowly rising PSA up to 1.56; also complained ED, failed medications;  dysuria, on terazosin 5 mg daily     Patient has no nausea, no vomiting, no fever.     NATI: Deferred     PSA: 1.56     New patient here today to re-establish with urology for follow up on Prostate Cancer, ED, BPH, hx of radiation therapy. He previously was seeing Dr. Davina Camacho, last seen 5/6/22. Jackson score 7 (4+3). He was to continue Terazosin 5mg daily.  PSA 10/26/22 1.56  PSA 5/2/22 1.25  Prostate biopsy done 4/16/14 per Dr. Jeff Casper---FINAL DIAGNOSIS  A. PROSTATE, RIGHT SIDE, BIOPSIES:  --ADENOCARCINOMA, MIRYAM SCORE 7 (4+3), INVOLVING APPROXIMATELY 50% OF THE  SPECIMEN WITH PERINEURAL INVASION  B. PROSTATE, LEFT SIDE, BIOPSIES:  --BENIGN PROSTATIC TISSUE     Patient denies dysuria, burning, hematuria. He has nocturia x2 depending on liquid intake.  -JMorgenstern CMA     IO Glucose - Urine Negative REQUIRED    IO Bilirubin Negative REQUIRED    IO Ketones Negative REQUIRED    IO Specific Gravity 1.025 REQUIRED    IO Blood Negative REQUIRED    IO pH 6.0 REQUIRED    IO Protein, Urine (+)30 REQUIRED    IO Urobilinogen 1 mg/dl REQUIRED    IO Nitrite, Urine Negative REQUIRED    IO Leukocytes Negative      We discussed the prostate cancer status post IMRT, rising PSA, continue monitoring PSA and possible Lupron injection if PSA rising above 2.0;  We discussed erectile dysfunction, failed medications, possible penile injection  We discussed benign prostate hypertrophy, possible PVP laser prostatectomy  All the questions were answered, the patient expressed understanding and agreed to the plan.     Impression  Prostate cancer [place] 7, status post IMRT  BPH  ED     Plan  PSA in 3 months, 6 months  Appointment in 6 months           5/6/2023 Davina Camacho  Prostate Cancer, clinical stage T2a NX M0, Miryam  score 7 (4 + 3), initial PSA 6.0 mg/mL, group IIA  completed xrt between the dates of July 1, 2014,  and August 8, 2014  PSA is stable     ED, no chest pain or nitro use     recommend physical therapy with a  manual vacuum erection device  1. to use the pump for physical therapy every other day-pump up, hold for 20-30 sec, then repeat 5-6 times. In 6-8 weeks of consistent use they will see results of increased length and girth  2. may use BRAD for intercourse with or without constriction ring  3. may use constriction ring alone if they are able to obtain the erection but cannot sustain the erection     Contraindicated in men on blood thinners or problems with bruising  Recommend purchasing at [facility] locations:  [address] [place] [id number] [phone]  Hours Mon-Sat 10 am-Midnight  Sun Noon-10pm     urinary frequency and nocturia  diabetic, may be causing overactivity of the bladder  on terazosin 5 mg daily, may increase dose  good response to oxybutynin 10 mg ER daily. but had significant dry mouth and stopped this medication  denies glaucoma     return to clinic in 6 months with PSA prior           PSA   11/01/2023  2.74  08/02/2023  2.31  04/27/23 1.81  02/01/23 1.69  10/26/22 1.56   5/2/22 1.25        Surgery    July 1, 2014, IMRT

## 2024-06-11 ENCOUNTER — OFFICE VISIT (OUTPATIENT)
Dept: WOUND CARE | Facility: CLINIC | Age: 84
End: 2024-06-11
Payer: MEDICARE

## 2024-06-11 PROCEDURE — 11042 DBRDMT SUBQ TIS 1ST 20SQCM/<: CPT

## 2024-06-13 ENCOUNTER — TELEPHONE (OUTPATIENT)
Dept: PRIMARY CARE | Facility: CLINIC | Age: 84
End: 2024-06-13
Payer: MEDICARE

## 2024-06-13 DIAGNOSIS — E11.69 TYPE 2 DIABETES MELLITUS WITH OTHER SPECIFIED COMPLICATION, WITHOUT LONG-TERM CURRENT USE OF INSULIN (MULTI): Primary | ICD-10-CM

## 2024-06-13 DIAGNOSIS — I48.20 CHRONIC ATRIAL FIBRILLATION (MULTI): ICD-10-CM

## 2024-06-13 NOTE — TELEPHONE ENCOUNTER
PT CALLED AND STATED THAT HE HAS AN APT ON 06/19/24 WITH YOU AND THOUGHT HE WAS TO HAVE LABS THAT ALSO INCLUDED A HGB A1C LEVEL.    PLEASE REVIEW AND ADVISE

## 2024-06-14 NOTE — TELEPHONE ENCOUNTER
Pt notified that lab order has been placed into the system and the lab work should be done fasting

## 2024-06-17 ENCOUNTER — LAB (OUTPATIENT)
Dept: LAB | Facility: LAB | Age: 84
End: 2024-06-17
Payer: MEDICARE

## 2024-06-17 DIAGNOSIS — E11.69 TYPE 2 DIABETES MELLITUS WITH OTHER SPECIFIED COMPLICATION, WITHOUT LONG-TERM CURRENT USE OF INSULIN (MULTI): ICD-10-CM

## 2024-06-17 DIAGNOSIS — I48.20 CHRONIC ATRIAL FIBRILLATION (MULTI): ICD-10-CM

## 2024-06-17 LAB
ALBUMIN SERPL BCP-MCNC: 3.9 G/DL (ref 3.4–5)
ALP SERPL-CCNC: 55 U/L (ref 33–136)
ALT SERPL W P-5'-P-CCNC: 14 U/L (ref 10–52)
ANION GAP SERPL CALC-SCNC: 10 MMOL/L (ref 10–20)
AST SERPL W P-5'-P-CCNC: 15 U/L (ref 9–39)
BASOPHILS # BLD AUTO: 0.05 X10*3/UL (ref 0–0.1)
BASOPHILS NFR BLD AUTO: 0.8 %
BILIRUB SERPL-MCNC: 0.8 MG/DL (ref 0–1.2)
BUN SERPL-MCNC: 18 MG/DL (ref 6–23)
CALCIUM SERPL-MCNC: 9.1 MG/DL (ref 8.6–10.3)
CHLORIDE SERPL-SCNC: 106 MMOL/L (ref 98–107)
CO2 SERPL-SCNC: 31 MMOL/L (ref 21–32)
CREAT SERPL-MCNC: 1.02 MG/DL (ref 0.5–1.3)
EGFRCR SERPLBLD CKD-EPI 2021: 72 ML/MIN/1.73M*2
EOSINOPHIL # BLD AUTO: 0.24 X10*3/UL (ref 0–0.4)
EOSINOPHIL NFR BLD AUTO: 3.7 %
ERYTHROCYTE [DISTWIDTH] IN BLOOD BY AUTOMATED COUNT: 15.9 % (ref 11.5–14.5)
GLUCOSE SERPL-MCNC: 106 MG/DL (ref 74–99)
HCT VFR BLD AUTO: 33.2 % (ref 41–52)
HGB BLD-MCNC: 10.8 G/DL (ref 13.5–17.5)
IMM GRANULOCYTES # BLD AUTO: 0.02 X10*3/UL (ref 0–0.5)
IMM GRANULOCYTES NFR BLD AUTO: 0.3 % (ref 0–0.9)
INR PPP: 2.4 (ref 0.9–1.1)
LYMPHOCYTES # BLD AUTO: 1.68 X10*3/UL (ref 0.8–3)
LYMPHOCYTES NFR BLD AUTO: 26.1 %
MCH RBC QN AUTO: 30.9 PG (ref 26–34)
MCHC RBC AUTO-ENTMCNC: 32.5 G/DL (ref 32–36)
MCV RBC AUTO: 95 FL (ref 80–100)
MONOCYTES # BLD AUTO: 0.69 X10*3/UL (ref 0.05–0.8)
MONOCYTES NFR BLD AUTO: 10.7 %
NEUTROPHILS # BLD AUTO: 3.76 X10*3/UL (ref 1.6–5.5)
NEUTROPHILS NFR BLD AUTO: 58.4 %
NRBC BLD-RTO: 0 /100 WBCS (ref 0–0)
PLATELET # BLD AUTO: 206 X10*3/UL (ref 150–450)
POTASSIUM SERPL-SCNC: 4.7 MMOL/L (ref 3.5–5.3)
PROT SERPL-MCNC: 6.2 G/DL (ref 6.4–8.2)
PROTHROMBIN TIME: 27.2 SECONDS (ref 9.8–12.8)
RBC # BLD AUTO: 3.5 X10*6/UL (ref 4.5–5.9)
SODIUM SERPL-SCNC: 142 MMOL/L (ref 136–145)
WBC # BLD AUTO: 6.4 X10*3/UL (ref 4.4–11.3)

## 2024-06-17 PROCEDURE — 85610 PROTHROMBIN TIME: CPT

## 2024-06-17 PROCEDURE — 80053 COMPREHEN METABOLIC PANEL: CPT

## 2024-06-17 PROCEDURE — 85025 COMPLETE CBC W/AUTO DIFF WBC: CPT

## 2024-06-18 ENCOUNTER — OFFICE VISIT (OUTPATIENT)
Dept: WOUND CARE | Facility: CLINIC | Age: 84
End: 2024-06-18
Payer: MEDICARE

## 2024-06-18 PROCEDURE — 99212 OFFICE O/P EST SF 10 MIN: CPT

## 2024-06-19 ENCOUNTER — APPOINTMENT (OUTPATIENT)
Dept: PRIMARY CARE | Facility: CLINIC | Age: 84
End: 2024-06-19
Payer: MEDICARE

## 2024-06-19 VITALS
WEIGHT: 259 LBS | DIASTOLIC BLOOD PRESSURE: 62 MMHG | BODY MASS INDEX: 37.16 KG/M2 | SYSTOLIC BLOOD PRESSURE: 132 MMHG | HEART RATE: 64 BPM | TEMPERATURE: 97.3 F | OXYGEN SATURATION: 96 %

## 2024-06-19 DIAGNOSIS — L03.115 CELLULITIS OF RIGHT LEG: ICD-10-CM

## 2024-06-19 DIAGNOSIS — E11.69 TYPE 2 DIABETES MELLITUS WITH OTHER SPECIFIED COMPLICATION, WITHOUT LONG-TERM CURRENT USE OF INSULIN (MULTI): ICD-10-CM

## 2024-06-19 DIAGNOSIS — D64.9 ANEMIA, UNSPECIFIED TYPE: Primary | ICD-10-CM

## 2024-06-19 DIAGNOSIS — I87.2 VENOUS INSUFFICIENCY: ICD-10-CM

## 2024-06-19 PROCEDURE — 1159F MED LIST DOCD IN RCRD: CPT | Performed by: FAMILY MEDICINE

## 2024-06-19 PROCEDURE — 99214 OFFICE O/P EST MOD 30 MIN: CPT | Performed by: FAMILY MEDICINE

## 2024-06-19 PROCEDURE — 3078F DIAST BP <80 MM HG: CPT | Performed by: FAMILY MEDICINE

## 2024-06-19 PROCEDURE — 1036F TOBACCO NON-USER: CPT | Performed by: FAMILY MEDICINE

## 2024-06-19 PROCEDURE — 1160F RVW MEDS BY RX/DR IN RCRD: CPT | Performed by: FAMILY MEDICINE

## 2024-06-19 PROCEDURE — 3075F SYST BP GE 130 - 139MM HG: CPT | Performed by: FAMILY MEDICINE

## 2024-06-19 NOTE — PROGRESS NOTES
"Subjective   Patient ID: Cade Olivas \"Louise" is a 84 y.o. male who presents for Follow-up (Cellulitis of RLE).    HPI   Here today to follow-up on cellulitis and swelling of legs  He has been working with wound care and was released in the last week as wounds are clear  He feels that the Lasix once a day is keeping his swelling down well  He is not having pain in the leg  He has not noticed any increasing redness  He is not having any fevers, chills, nausea, vomiting    He did recently have labs done that showed mild worsening of his anemia  He states this has been very longstanding and was discussed at length with his previous PCP.  He is unsure if he would want to do any further workup for anemia at this point  He was always told it was just due to his Coumadin  He has not noted any blood in his urine or bowel movements  Review of Systems  As noted HPI  Objective   /62 (BP Location: Left arm, Patient Position: Sitting)   Pulse 64   Temp 36.3 °C (97.3 °F) (Temporal)   Wt 117 kg (259 lb)   SpO2 96%   BMI 37.16 kg/m²     Physical Exam  Constitutional:       Appearance: Normal appearance. He is obese.   Cardiovascular:      Rate and Rhythm: Normal rate.      Pulses:           Dorsalis pedis pulses are 1+ on the right side and 1+ on the left side.        Posterior tibial pulses are 1+ on the right side and 1+ on the left side.   Pulmonary:      Effort: Pulmonary effort is normal.   Musculoskeletal:      Right lower le+ Edema present.      Left lower le+ Edema present.   Skin:     Coloration: Skin is not pale.      Findings: No bruising or petechiae.          Neurological:      Mental Status: He is alert.         Assessment/Plan   1. Anemia, unspecified type  Lab Results   Component Value Date    WBC 6.4 2024    HGB 10.8 (L) 2024    HCT 33.2 (L) 2024    MCV 95 2024     2024     Discussed with patient next possible steps.  He agrees to recheck labs in about 3 " months unless concerns sooner  - CBC and Auto Differential; Future    2. Type 2 diabetes mellitus with other specified complication, without long-term current use of insulin (Multi)  Continue on current medications.  Continue work on healthy lifestyle choices and check A1c 3months  - Comprehensive Metabolic Panel; Future  - Hemoglobin A1C; Future    3. Cellulitis of right leg 4. Venous insufficiency  At this point cellulitis resolved.  Discussed going back to wound clinic if any new wounds develop  He will continue on Lasix to keep swelling down              Ivan Parrish, DO

## 2024-06-20 ENCOUNTER — TELEPHONE (OUTPATIENT)
Dept: UROLOGY | Facility: CLINIC | Age: 84
End: 2024-06-20
Payer: MEDICARE

## 2024-06-20 DIAGNOSIS — E11.69 TYPE 2 DIABETES MELLITUS WITH OTHER SPECIFIED COMPLICATION, WITHOUT LONG-TERM CURRENT USE OF INSULIN (MULTI): ICD-10-CM

## 2024-06-20 DIAGNOSIS — I10 BENIGN ESSENTIAL HYPERTENSION: ICD-10-CM

## 2024-06-20 DIAGNOSIS — I48.20 CHRONIC ATRIAL FIBRILLATION (MULTI): ICD-10-CM

## 2024-06-20 DIAGNOSIS — C61 PROSTATE CANCER (MULTI): ICD-10-CM

## 2024-06-20 DIAGNOSIS — N40.0 BENIGN PROSTATIC HYPERPLASIA, UNSPECIFIED WHETHER LOWER URINARY TRACT SYMPTOMS PRESENT: ICD-10-CM

## 2024-06-20 RX ORDER — METOPROLOL TARTRATE 50 MG/1
TABLET ORAL 2 TIMES DAILY
Qty: 90 TABLET | Refills: 3 | Status: SHIPPED | OUTPATIENT
Start: 2024-06-20 | End: 2025-06-20

## 2024-06-20 RX ORDER — TERAZOSIN 5 MG/1
10 CAPSULE ORAL NIGHTLY
Qty: 90 CAPSULE | Refills: 3 | Status: SHIPPED | OUTPATIENT
Start: 2024-06-20 | End: 2024-12-17

## 2024-06-20 RX ORDER — SIMVASTATIN 20 MG/1
20 TABLET, FILM COATED ORAL DAILY
Qty: 90 TABLET | Refills: 3 | Status: SHIPPED | OUTPATIENT
Start: 2024-06-20 | End: 2025-06-20

## 2024-06-20 RX ORDER — GLIMEPIRIDE 2 MG/1
2 TABLET ORAL
Qty: 90 TABLET | Refills: 3 | Status: SHIPPED | OUTPATIENT
Start: 2024-06-20

## 2024-06-20 RX ORDER — LISINOPRIL 10 MG/1
10 TABLET ORAL DAILY
Qty: 90 TABLET | Refills: 3 | Status: SHIPPED | OUTPATIENT
Start: 2024-06-20

## 2024-06-20 NOTE — TELEPHONE ENCOUNTER
Pt calling in asking if we can switch the dosage to a full 10mg tablet of terazosin for him to take one tablet instead of 2. So it would take one tablet (10mg) by mouth once daily at bedtime at a 90 day quantity for three refills. Patient stated he discussed this with you at last appointment.

## 2024-06-21 RX ORDER — WARFARIN 10 MG/1
10 TABLET ORAL
Qty: 90 TABLET | Refills: 1 | Status: SHIPPED | OUTPATIENT
Start: 2024-06-21

## 2024-06-21 RX ORDER — METFORMIN HYDROCHLORIDE 500 MG/1
1000 TABLET ORAL 2 TIMES DAILY
Qty: 360 TABLET | Refills: 1 | Status: SHIPPED | OUTPATIENT
Start: 2024-06-21

## 2024-06-25 ENCOUNTER — TELEPHONE (OUTPATIENT)
Dept: PRIMARY CARE | Facility: CLINIC | Age: 84
End: 2024-06-25
Payer: MEDICARE

## 2024-06-25 ENCOUNTER — APPOINTMENT (OUTPATIENT)
Dept: WOUND CARE | Facility: CLINIC | Age: 84
End: 2024-06-25
Payer: MEDICARE

## 2024-06-25 NOTE — TELEPHONE ENCOUNTER
PT OF DR MAGAÑA'S CALLED TO STATE HE HAS BLISTERS (2) AGAIN ON THE RIGHT LOWER LEG, WHERE HE ALSO HAS CELLULITIS ( PT WAS SEEN ON 06/19/24 HERE IN OFFICE WITH DR MAGAÑA)  BLISTERS STARTED THIS MORNING, NO WEEPING NOTED.  PT IS ON NO ANTIBIOTIC AT THIS TIME BUT IS ON WARFARIN 5 MG 1 TABLET DAILY.  PT ALSO STATED HAS A CALL IN TO HIS DENTIST BECAUSE HAS AN APT 07/02/24 AND NEEDS TO KNOW WHEN TO STOP HIS WARFARIN.    PT WONDERING WHAT TO DO ABOUT THE BLISTERING THAT HAS RETURNED.    TONY PARSON [phone]

## 2024-06-26 ENCOUNTER — OFFICE VISIT (OUTPATIENT)
Dept: PRIMARY CARE | Facility: CLINIC | Age: 84
End: 2024-06-26
Payer: MEDICARE

## 2024-06-26 VITALS
BODY MASS INDEX: 37.59 KG/M2 | TEMPERATURE: 97.3 F | DIASTOLIC BLOOD PRESSURE: 74 MMHG | HEART RATE: 66 BPM | WEIGHT: 262 LBS | SYSTOLIC BLOOD PRESSURE: 120 MMHG | OXYGEN SATURATION: 98 %

## 2024-06-26 DIAGNOSIS — L03.115 CELLULITIS OF RIGHT LOWER EXTREMITY: Primary | ICD-10-CM

## 2024-06-26 DIAGNOSIS — I48.20 CHRONIC ATRIAL FIBRILLATION (MULTI): ICD-10-CM

## 2024-06-26 DIAGNOSIS — I87.2 VENOUS INSUFFICIENCY: ICD-10-CM

## 2024-06-26 PROCEDURE — 1159F MED LIST DOCD IN RCRD: CPT | Performed by: NURSE PRACTITIONER

## 2024-06-26 PROCEDURE — 99214 OFFICE O/P EST MOD 30 MIN: CPT | Performed by: NURSE PRACTITIONER

## 2024-06-26 PROCEDURE — 3078F DIAST BP <80 MM HG: CPT | Performed by: NURSE PRACTITIONER

## 2024-06-26 PROCEDURE — 3074F SYST BP LT 130 MM HG: CPT | Performed by: NURSE PRACTITIONER

## 2024-06-26 PROCEDURE — 1036F TOBACCO NON-USER: CPT | Performed by: NURSE PRACTITIONER

## 2024-06-26 PROCEDURE — 1160F RVW MEDS BY RX/DR IN RCRD: CPT | Performed by: NURSE PRACTITIONER

## 2024-06-26 RX ORDER — SULFAMETHOXAZOLE AND TRIMETHOPRIM 800; 160 MG/1; MG/1
1 TABLET ORAL 2 TIMES DAILY
Qty: 14 TABLET | Refills: 0 | Status: SHIPPED | OUTPATIENT
Start: 2024-06-26 | End: 2024-07-03

## 2024-06-26 RX ORDER — TERAZOSIN 10 MG/1
10 CAPSULE ORAL DAILY
COMMUNITY
Start: 2024-06-22

## 2024-06-26 RX ORDER — CEPHALEXIN 500 MG/1
500 CAPSULE ORAL 4 TIMES DAILY
Qty: 28 CAPSULE | Refills: 0 | Status: SHIPPED | OUTPATIENT
Start: 2024-06-26 | End: 2024-07-03

## 2024-06-26 ASSESSMENT — ENCOUNTER SYMPTOMS
MUSCULOSKELETAL NEGATIVE: 1
NEUROLOGICAL NEGATIVE: 1
PSYCHIATRIC NEGATIVE: 1
ROS SKIN COMMENTS: AS NOTED IN HPI
CONSTITUTIONAL NEGATIVE: 1
RESPIRATORY NEGATIVE: 1
CARDIOVASCULAR NEGATIVE: 1

## 2024-06-26 NOTE — PATIENT INSTRUCTIONS
Please take the medication as directed.   Call the wound clinic today and be reevaluated.   Please keep leg elevated when sitting

## 2024-06-26 NOTE — PROGRESS NOTES
Subjective   Patient ID: Kaden Olivas is a 84 y.o. male who presents for Follow-up (Cellulitis RLE).    HPI Presents today for concerns for his infection in the right leg returning.    Notes 6/18/2024 and 6/19/2024 reviewed.   He states he noticed 2 blisters forming on his lower leg again.  He and his wife feel that the infection under the skin has not resolved.   I did educate both he and his wife on the underlying fluid does come through the tissue which can open the skin.   Patient has not been elevating his legs or wearing his compression stockings.  No fever or chills. Np pain in the leg.  States is always red and does not seem to be any different now.     Review of Systems   Constitutional: Negative.    Respiratory: Negative.     Cardiovascular: Negative.    Musculoskeletal: Negative.    Skin:         As noted in HPI     Neurological: Negative.    Psychiatric/Behavioral: Negative.         Objective   /74 (BP Location: Right arm, Patient Position: Sitting)   Pulse 66   Temp 36.3 °C (97.3 °F) (Temporal)   Wt 119 kg (262 lb)   SpO2 98%   BMI 37.59 kg/m²     Physical Exam  Constitutional:       Appearance: Normal appearance. He is obese.   Cardiovascular:      Rate and Rhythm: Normal rate. Rhythm irregular.   Pulmonary:      Effort: Pulmonary effort is normal.      Breath sounds: Normal breath sounds.   Musculoskeletal:         General: Normal range of motion.   Skin:     General: Skin is warm.      Comments: Right lower leg with erythema and small 1 fluid filled blister anterior lower aspect of the leg.  Not warm or tender to touch 2 plus pitting edema bilaterally   Neurological:      General: No focal deficit present.      Mental Status: He is alert.   Psychiatric:         Mood and Affect: Mood normal.         Behavior: Behavior normal.         Assessment/Plan   Problem List Items Addressed This Visit             ICD-10-CM    Atrial fibrillation (Multi) I48.91     Other Visit Diagnoses          Codes    Cellulitis of right lower extremity    -  Primary L03.115    Relevant Medications    sulfamethoxazole-trimethoprim (Bactrim DS) 800-160 mg tablet    cephalexin (Keflex) 500 mg capsule    Venous insufficiency     I87.2

## 2024-07-01 ENCOUNTER — OFFICE VISIT (OUTPATIENT)
Dept: WOUND CARE | Facility: CLINIC | Age: 84
End: 2024-07-01
Payer: MEDICARE

## 2024-07-01 PROCEDURE — 99213 OFFICE O/P EST LOW 20 MIN: CPT

## 2024-07-08 ENCOUNTER — OFFICE VISIT (OUTPATIENT)
Dept: WOUND CARE | Facility: CLINIC | Age: 84
End: 2024-07-08
Payer: MEDICARE

## 2024-07-08 PROCEDURE — 99213 OFFICE O/P EST LOW 20 MIN: CPT

## 2024-07-15 ENCOUNTER — OFFICE VISIT (OUTPATIENT)
Dept: WOUND CARE | Facility: CLINIC | Age: 84
End: 2024-07-15
Payer: MEDICARE

## 2024-07-15 PROCEDURE — 99212 OFFICE O/P EST SF 10 MIN: CPT

## 2024-07-16 ENCOUNTER — LAB (OUTPATIENT)
Dept: LAB | Facility: LAB | Age: 84
End: 2024-07-16
Payer: MEDICARE

## 2024-07-16 DIAGNOSIS — I48.20 CHRONIC ATRIAL FIBRILLATION (MULTI): ICD-10-CM

## 2024-07-16 LAB
INR PPP: 1.7 (ref 0.9–1.1)
PROTHROMBIN TIME: 19.5 SECONDS (ref 9.8–12.8)

## 2024-07-16 PROCEDURE — 36415 COLL VENOUS BLD VENIPUNCTURE: CPT

## 2024-07-16 PROCEDURE — 85610 PROTHROMBIN TIME: CPT

## 2024-07-19 ENCOUNTER — TELEPHONE (OUTPATIENT)
Dept: PRIMARY CARE | Facility: CLINIC | Age: 84
End: 2024-07-19
Payer: MEDICARE

## 2024-07-19 NOTE — TELEPHONE ENCOUNTER
----- Message from Ivan Parrish sent at 7/18/2024  2:19 PM EDT -----  Let patient know INR slightly low.  No change recheck 1 week

## 2024-07-22 ENCOUNTER — APPOINTMENT (OUTPATIENT)
Dept: WOUND CARE | Facility: CLINIC | Age: 84
End: 2024-07-22
Payer: MEDICARE

## 2024-07-22 ENCOUNTER — APPOINTMENT (OUTPATIENT)
Dept: PRIMARY CARE | Facility: CLINIC | Age: 84
End: 2024-07-22
Payer: MEDICARE

## 2024-07-25 ENCOUNTER — LAB (OUTPATIENT)
Dept: LAB | Facility: LAB | Age: 84
End: 2024-07-25
Payer: MEDICARE

## 2024-07-25 DIAGNOSIS — I48.20 CHRONIC ATRIAL FIBRILLATION (MULTI): ICD-10-CM

## 2024-07-25 LAB
INR PPP: 2 (ref 0.9–1.1)
PROTHROMBIN TIME: 23 SECONDS (ref 9.8–12.8)

## 2024-07-25 PROCEDURE — 85610 PROTHROMBIN TIME: CPT

## 2024-07-25 PROCEDURE — 36415 COLL VENOUS BLD VENIPUNCTURE: CPT

## 2024-07-30 ENCOUNTER — APPOINTMENT (OUTPATIENT)
Dept: VASCULAR SURGERY | Facility: CLINIC | Age: 84
End: 2024-07-30
Payer: MEDICARE

## 2024-07-30 VITALS
WEIGHT: 261 LBS | HEART RATE: 71 BPM | BODY MASS INDEX: 37.45 KG/M2 | SYSTOLIC BLOOD PRESSURE: 119 MMHG | DIASTOLIC BLOOD PRESSURE: 74 MMHG

## 2024-07-30 DIAGNOSIS — M79.89 LEG SWELLING: Primary | ICD-10-CM

## 2024-07-30 PROCEDURE — 1159F MED LIST DOCD IN RCRD: CPT | Performed by: SURGERY

## 2024-07-30 PROCEDURE — 1036F TOBACCO NON-USER: CPT | Performed by: SURGERY

## 2024-07-30 PROCEDURE — 3078F DIAST BP <80 MM HG: CPT | Performed by: SURGERY

## 2024-07-30 PROCEDURE — 99212 OFFICE O/P EST SF 10 MIN: CPT | Performed by: SURGERY

## 2024-07-30 PROCEDURE — 3074F SYST BP LT 130 MM HG: CPT | Performed by: SURGERY

## 2024-07-30 NOTE — PROGRESS NOTES
"Subjective   Patient ID: Cade Olivas \"Louise" is a 84 y.o. male who presents for Follow-up (3 mo post venogram 4/12 ).  HPI  Patient is overall doing well  Otherwise active and ambulatory  Swelling is stable  Still on a diuretic per PCP with slight improvement  No active ulcers or sores noted.    Review of Systems    Objective   Physical Exam  Physical exam    Constitutional: alert and in no acute distress verbal  Eyes: No erythema swelling or discharge noted  Neck: supple, symmetric, trachea midline, no masses noted  Cardiovascular: Carotid pulses 2+, no obvious bruit, no Jugular distension noted, no thrill, heart regular rate, lower extremity vascular exam intact, cap refill <2 sec  Pulmonary:  Bilateral breath sounds intact, clear with rales rhonchi or wheeze  Abdomen: soft non tender, no pulsatile masses noted, no rebound rigidity or guarding noted  Skin: intact warm no abnormal turgor  Psychiatric: alert without any obvious cognitive issues, oriented to person, place, and time    Assessment/Plan   Bilateral lower extremity edema right greater than left  Continue compression elevation activity  Venogram IVUS demonstrated minimal compression  Patient may need lymphedema clinic referral  Patient has upcoming cardiology appointment to evaluate cardiac function.  Follow-up with me 1 year       Hood Gillespie DO 07/30/24 3:45 PM   "

## 2024-08-16 DIAGNOSIS — S81.801D WOUND OF RIGHT LOWER EXTREMITY, SUBSEQUENT ENCOUNTER: ICD-10-CM

## 2024-08-16 DIAGNOSIS — L03.115 CELLULITIS OF RIGHT LOWER EXTREMITY: ICD-10-CM

## 2024-08-19 RX ORDER — FUROSEMIDE 40 MG/1
40 TABLET ORAL DAILY
Qty: 30 TABLET | Refills: 0 | Status: SHIPPED | OUTPATIENT
Start: 2024-08-19

## 2024-09-06 DIAGNOSIS — I48.20 CHRONIC ATRIAL FIBRILLATION (MULTI): ICD-10-CM

## 2024-09-12 ENCOUNTER — LAB (OUTPATIENT)
Dept: LAB | Facility: LAB | Age: 84
End: 2024-09-12
Payer: MEDICARE

## 2024-09-12 DIAGNOSIS — E11.69 TYPE 2 DIABETES MELLITUS WITH OTHER SPECIFIED COMPLICATION, WITHOUT LONG-TERM CURRENT USE OF INSULIN (MULTI): ICD-10-CM

## 2024-09-12 DIAGNOSIS — I48.20 CHRONIC ATRIAL FIBRILLATION (MULTI): ICD-10-CM

## 2024-09-12 DIAGNOSIS — D64.9 ANEMIA, UNSPECIFIED TYPE: ICD-10-CM

## 2024-09-12 LAB
ALBUMIN SERPL BCP-MCNC: 4 G/DL (ref 3.4–5)
ALP SERPL-CCNC: 63 U/L (ref 33–136)
ALT SERPL W P-5'-P-CCNC: 20 U/L (ref 10–52)
ANION GAP SERPL CALC-SCNC: 10 MMOL/L (ref 10–20)
AST SERPL W P-5'-P-CCNC: 17 U/L (ref 9–39)
BASOPHILS # BLD AUTO: 0.04 X10*3/UL (ref 0–0.1)
BASOPHILS NFR BLD AUTO: 0.5 %
BILIRUB SERPL-MCNC: 1.2 MG/DL (ref 0–1.2)
BUN SERPL-MCNC: 25 MG/DL (ref 6–23)
CALCIUM SERPL-MCNC: 8.9 MG/DL (ref 8.6–10.3)
CHLORIDE SERPL-SCNC: 106 MMOL/L (ref 98–107)
CO2 SERPL-SCNC: 30 MMOL/L (ref 21–32)
CREAT SERPL-MCNC: 1.04 MG/DL (ref 0.5–1.3)
EGFRCR SERPLBLD CKD-EPI 2021: 71 ML/MIN/1.73M*2
EOSINOPHIL # BLD AUTO: 0.3 X10*3/UL (ref 0–0.4)
EOSINOPHIL NFR BLD AUTO: 3.8 %
ERYTHROCYTE [DISTWIDTH] IN BLOOD BY AUTOMATED COUNT: 14.2 % (ref 11.5–14.5)
GLUCOSE SERPL-MCNC: 128 MG/DL (ref 74–99)
HCT VFR BLD AUTO: 35.7 % (ref 41–52)
HGB BLD-MCNC: 11.4 G/DL (ref 13.5–17.5)
IMM GRANULOCYTES # BLD AUTO: 0.02 X10*3/UL (ref 0–0.5)
IMM GRANULOCYTES NFR BLD AUTO: 0.3 % (ref 0–0.9)
INR PPP: 2.1 (ref 0.9–1.1)
LYMPHOCYTES # BLD AUTO: 1.95 X10*3/UL (ref 0.8–3)
LYMPHOCYTES NFR BLD AUTO: 24.7 %
MCH RBC QN AUTO: 31.1 PG (ref 26–34)
MCHC RBC AUTO-ENTMCNC: 31.9 G/DL (ref 32–36)
MCV RBC AUTO: 97 FL (ref 80–100)
MONOCYTES # BLD AUTO: 0.83 X10*3/UL (ref 0.05–0.8)
MONOCYTES NFR BLD AUTO: 10.5 %
NEUTROPHILS # BLD AUTO: 4.77 X10*3/UL (ref 1.6–5.5)
NEUTROPHILS NFR BLD AUTO: 60.2 %
NRBC BLD-RTO: 0 /100 WBCS (ref 0–0)
PLATELET # BLD AUTO: 197 X10*3/UL (ref 150–450)
POTASSIUM SERPL-SCNC: 4.8 MMOL/L (ref 3.5–5.3)
PROT SERPL-MCNC: 6.4 G/DL (ref 6.4–8.2)
PROTHROMBIN TIME: 24 SECONDS (ref 9.8–12.8)
RBC # BLD AUTO: 3.67 X10*6/UL (ref 4.5–5.9)
SODIUM SERPL-SCNC: 141 MMOL/L (ref 136–145)
WBC # BLD AUTO: 7.9 X10*3/UL (ref 4.4–11.3)

## 2024-09-12 PROCEDURE — 80053 COMPREHEN METABOLIC PANEL: CPT

## 2024-09-12 PROCEDURE — 85610 PROTHROMBIN TIME: CPT

## 2024-09-12 PROCEDURE — 36415 COLL VENOUS BLD VENIPUNCTURE: CPT

## 2024-09-12 PROCEDURE — 85025 COMPLETE CBC W/AUTO DIFF WBC: CPT

## 2024-09-14 DIAGNOSIS — S81.801D WOUND OF RIGHT LOWER EXTREMITY, SUBSEQUENT ENCOUNTER: ICD-10-CM

## 2024-09-14 DIAGNOSIS — L03.115 CELLULITIS OF RIGHT LOWER EXTREMITY: ICD-10-CM

## 2024-09-16 RX ORDER — FUROSEMIDE 40 MG/1
40 TABLET ORAL DAILY
Qty: 30 TABLET | Refills: 0 | Status: SHIPPED | OUTPATIENT
Start: 2024-09-16 | End: 2024-09-18 | Stop reason: SDUPTHER

## 2024-09-18 ENCOUNTER — APPOINTMENT (OUTPATIENT)
Dept: PRIMARY CARE | Facility: CLINIC | Age: 84
End: 2024-09-18
Payer: MEDICARE

## 2024-09-18 ENCOUNTER — OFFICE VISIT (OUTPATIENT)
Dept: PRIMARY CARE | Facility: CLINIC | Age: 84
End: 2024-09-18
Payer: MEDICARE

## 2024-09-18 VITALS
BODY MASS INDEX: 38.27 KG/M2 | DIASTOLIC BLOOD PRESSURE: 60 MMHG | WEIGHT: 258.4 LBS | OXYGEN SATURATION: 97 % | SYSTOLIC BLOOD PRESSURE: 100 MMHG | HEART RATE: 58 BPM | HEIGHT: 69 IN | TEMPERATURE: 97.2 F

## 2024-09-18 DIAGNOSIS — I10 BENIGN ESSENTIAL HYPERTENSION: ICD-10-CM

## 2024-09-18 DIAGNOSIS — I48.20 CHRONIC ATRIAL FIBRILLATION (MULTI): ICD-10-CM

## 2024-09-18 DIAGNOSIS — Z00.00 ROUTINE GENERAL MEDICAL EXAMINATION AT HEALTH CARE FACILITY: Primary | ICD-10-CM

## 2024-09-18 DIAGNOSIS — E78.5 HYPERLIPIDEMIA, UNSPECIFIED HYPERLIPIDEMIA TYPE: ICD-10-CM

## 2024-09-18 DIAGNOSIS — I51.7 DILATED VENTRICLE: ICD-10-CM

## 2024-09-18 DIAGNOSIS — Z23 NEED FOR INFLUENZA VACCINATION: ICD-10-CM

## 2024-09-18 DIAGNOSIS — L03.115 CELLULITIS OF RIGHT LOWER EXTREMITY: ICD-10-CM

## 2024-09-18 DIAGNOSIS — I87.2 VENOUS INSUFFICIENCY: ICD-10-CM

## 2024-09-18 PROCEDURE — 1160F RVW MEDS BY RX/DR IN RCRD: CPT | Performed by: FAMILY MEDICINE

## 2024-09-18 PROCEDURE — 1123F ACP DISCUSS/DSCN MKR DOCD: CPT | Performed by: FAMILY MEDICINE

## 2024-09-18 PROCEDURE — G0008 ADMIN INFLUENZA VIRUS VAC: HCPCS | Performed by: FAMILY MEDICINE

## 2024-09-18 PROCEDURE — 1036F TOBACCO NON-USER: CPT | Performed by: FAMILY MEDICINE

## 2024-09-18 PROCEDURE — G0439 PPPS, SUBSEQ VISIT: HCPCS | Performed by: FAMILY MEDICINE

## 2024-09-18 PROCEDURE — 3074F SYST BP LT 130 MM HG: CPT | Performed by: FAMILY MEDICINE

## 2024-09-18 PROCEDURE — 3078F DIAST BP <80 MM HG: CPT | Performed by: FAMILY MEDICINE

## 2024-09-18 PROCEDURE — 90662 IIV NO PRSV INCREASED AG IM: CPT | Performed by: FAMILY MEDICINE

## 2024-09-18 PROCEDURE — 99214 OFFICE O/P EST MOD 30 MIN: CPT | Performed by: FAMILY MEDICINE

## 2024-09-18 PROCEDURE — 1159F MED LIST DOCD IN RCRD: CPT | Performed by: FAMILY MEDICINE

## 2024-09-18 PROCEDURE — 1158F ADVNC CARE PLAN TLK DOCD: CPT | Performed by: FAMILY MEDICINE

## 2024-09-18 PROCEDURE — 1170F FXNL STATUS ASSESSED: CPT | Performed by: FAMILY MEDICINE

## 2024-09-18 RX ORDER — FUROSEMIDE 40 MG/1
40 TABLET ORAL DAILY
Qty: 90 TABLET | Refills: 0 | Status: SHIPPED | OUTPATIENT
Start: 2024-09-18

## 2024-09-18 ASSESSMENT — ENCOUNTER SYMPTOMS
MYALGIAS: 0
SLEEP DISTURBANCE: 0
ACTIVITY CHANGE: 0
DIARRHEA: 0
ADENOPATHY: 0
DYSPHORIC MOOD: 0
NAUSEA: 0
VOMITING: 0
APPETITE CHANGE: 0
COUGH: 0
CONSTIPATION: 0
WEAKNESS: 0
ABDOMINAL PAIN: 0
PALPITATIONS: 0
FEVER: 0
SHORTNESS OF BREATH: 0
NERVOUS/ANXIOUS: 0
DIFFICULTY URINATING: 0
BLOOD IN STOOL: 0
BRUISES/BLEEDS EASILY: 1
HEADACHES: 0
EYE PAIN: 0
SINUS PAIN: 0
ARTHRALGIAS: 0

## 2024-09-18 ASSESSMENT — ACTIVITIES OF DAILY LIVING (ADL)
TAKING_MEDICATION: INDEPENDENT
BATHING: INDEPENDENT
DOING_HOUSEWORK: INDEPENDENT
MANAGING_FINANCES: INDEPENDENT
DRESSING: INDEPENDENT
GROCERY_SHOPPING: INDEPENDENT

## 2024-09-18 ASSESSMENT — PATIENT HEALTH QUESTIONNAIRE - PHQ9
1. LITTLE INTEREST OR PLEASURE IN DOING THINGS: NOT AT ALL
2. FEELING DOWN, DEPRESSED OR HOPELESS: NOT AT ALL
SUM OF ALL RESPONSES TO PHQ9 QUESTIONS 1 AND 2: 0

## 2024-09-18 NOTE — PROGRESS NOTES
Subjective   Reason for Visit: Cade Olivas is an 84 y.o. male here for a Medicare Wellness visit.     CARE TEAM  urology- Tirso Camacho follows for erectile dysfunction, BPH as well as prostate cancer status post radiation therapy in 2014. Now on active surveillance- for UTIs 3 in last 6 months- has appt in Nov   ortho- Silver -follows as needed for knee replacement 10/18  vascular-may thurner ?  Cardio- Mike/Oscar- appt next month for ? HF work up      Compression stockings are helping swelling      Taking all meds as directed    Anemia - chronic and stable. No bleeding. Declines work up       Past Medical, Surgical, and Family History reviewed and updated in chart.    Reviewed all medications by prescribing practitioner or clinical pharmacist (such as prescriptions, OTCs, herbal therapies and supplements) and documented in the medical record.    HPI    Patient Self Assessment of Health Status  Patient Self Assessment: Good    Nutrition and Exercise  Current Diet: Well Balanced Diet  Adequate Fluid Intake: Yes  Caffeine: Yes  Exercise Frequency: Infrequently    Functional Ability/Level of Safety  Cognitive Impairment Observed: No cognitive impairment observed  Cognitive Impairment Reported: No cognitive impairment reported by patient or family    Home Safety Risk Factors: None    Patient Care Team:  Ivan Parrish DO as PCP - General     Review of Systems   Constitutional:  Negative for activity change, appetite change and fever.   HENT:  Negative for congestion, ear pain and sinus pain.    Eyes:  Negative for pain and visual disturbance.   Respiratory:  Negative for cough and shortness of breath.    Cardiovascular:  Negative for chest pain, palpitations and leg swelling.   Gastrointestinal:  Negative for abdominal pain, blood in stool, constipation, diarrhea, nausea and vomiting.   Endocrine: Negative for cold intolerance and heat intolerance.   Genitourinary:  Negative for difficulty urinating.  "  Musculoskeletal:  Negative for arthralgias, gait problem and myalgias.   Skin:  Negative for rash.   Neurological:  Negative for weakness and headaches.   Hematological:  Negative for adenopathy. Bruises/bleeds easily.   Psychiatric/Behavioral:  Negative for dysphoric mood and sleep disturbance. The patient is not nervous/anxious.        Objective   Vitals:  /60   Pulse 58   Temp 36.2 °C (97.2 °F)   Ht 1.759 m (5' 9.25\")   Wt 117 kg (258 lb 6.4 oz)   SpO2 97%   BMI 37.88 kg/m²       Physical Exam  Vitals and nursing note reviewed.   Constitutional:       Appearance: Normal appearance.   HENT:      Head: Normocephalic and atraumatic.      Nose: Nose normal.      Mouth/Throat:      Mouth: Mucous membranes are moist.   Eyes:      Pupils: Pupils are equal, round, and reactive to light.   Cardiovascular:      Rate and Rhythm: Normal rate and regular rhythm.      Pulses: Normal pulses.      Heart sounds: Normal heart sounds.   Pulmonary:      Effort: Pulmonary effort is normal.      Breath sounds: Normal breath sounds.   Musculoskeletal:         General: No swelling. Normal range of motion.      Cervical back: Normal range of motion and neck supple.      Right lower leg: Edema (mild) present.      Left lower leg: Edema (mild) present.   Skin:     Capillary Refill: Capillary refill takes less than 2 seconds.      Findings: Bruising (on forearms) present.   Neurological:      General: No focal deficit present.      Mental Status: He is alert. Mental status is at baseline.   Psychiatric:         Mood and Affect: Mood normal.         Behavior: Behavior normal.         Thought Content: Thought content normal.         Judgment: Judgment normal.         Assessment/Plan   Problem List Items Addressed This Visit    None  Visit Diagnoses       Routine general medical examination at health care facility    -  Primary    Relevant Orders    1 Year Follow Up In Primary Care - Wellness Exam    Need for influenza " vaccination        Relevant Orders    Flu vaccine, trivalent, preservative free, HIGH-DOSE, age 65y+ (Fluzone) (Completed)    Wound of right lower extremity, subsequent encounter        Relevant Medications    furosemide (Lasix) 40 mg tablet    Cellulitis of right lower extremity        Relevant Medications    furosemide (Lasix) 40 mg tablet        Controlled. Continue current medicines/regimen.   Agree with cardio work up- should have echo repeat  Declines anemia work up   A1c will do with next inr   Cont with care team- needs to see urology for utis + hx prostate ca  Cont with cardio and vascular  6 month fu for labs and med follow up   Flu shot today

## 2024-10-13 DIAGNOSIS — L03.115 CELLULITIS OF RIGHT LOWER EXTREMITY: ICD-10-CM

## 2024-10-14 ENCOUNTER — OFFICE VISIT (OUTPATIENT)
Dept: CARDIOLOGY | Facility: CLINIC | Age: 84
End: 2024-10-14
Payer: MEDICARE

## 2024-10-14 VITALS
SYSTOLIC BLOOD PRESSURE: 102 MMHG | TEMPERATURE: 97 F | HEIGHT: 70 IN | HEART RATE: 83 BPM | DIASTOLIC BLOOD PRESSURE: 63 MMHG | WEIGHT: 267.6 LBS | BODY MASS INDEX: 38.31 KG/M2

## 2024-10-14 DIAGNOSIS — R60.0 LOCALIZED EDEMA: ICD-10-CM

## 2024-10-14 DIAGNOSIS — I48.20 CHRONIC ATRIAL FIBRILLATION (MULTI): Primary | ICD-10-CM

## 2024-10-14 DIAGNOSIS — I10 BENIGN ESSENTIAL HYPERTENSION: ICD-10-CM

## 2024-10-14 DIAGNOSIS — E78.5 HYPERLIPIDEMIA, UNSPECIFIED HYPERLIPIDEMIA TYPE: ICD-10-CM

## 2024-10-14 DIAGNOSIS — R06.02 SHORTNESS OF BREATH: ICD-10-CM

## 2024-10-14 DIAGNOSIS — I36.1 NONRHEUMATIC TRICUSPID VALVE REGURGITATION: ICD-10-CM

## 2024-10-14 PROCEDURE — 1160F RVW MEDS BY RX/DR IN RCRD: CPT | Performed by: NURSE PRACTITIONER

## 2024-10-14 PROCEDURE — 1159F MED LIST DOCD IN RCRD: CPT | Performed by: NURSE PRACTITIONER

## 2024-10-14 PROCEDURE — 99215 OFFICE O/P EST HI 40 MIN: CPT | Performed by: NURSE PRACTITIONER

## 2024-10-14 PROCEDURE — 3074F SYST BP LT 130 MM HG: CPT | Performed by: NURSE PRACTITIONER

## 2024-10-14 PROCEDURE — 1123F ACP DISCUSS/DSCN MKR DOCD: CPT | Performed by: NURSE PRACTITIONER

## 2024-10-14 PROCEDURE — 3078F DIAST BP <80 MM HG: CPT | Performed by: NURSE PRACTITIONER

## 2024-10-14 PROCEDURE — 1036F TOBACCO NON-USER: CPT | Performed by: NURSE PRACTITIONER

## 2024-10-14 RX ORDER — OXYCODONE AND ACETAMINOPHEN 5; 325 MG/1; MG/1
1 TABLET ORAL EVERY 6 HOURS PRN
COMMUNITY
Start: 2024-10-04

## 2024-10-14 RX ORDER — FUROSEMIDE 40 MG/1
40 TABLET ORAL DAILY
Qty: 30 TABLET | Refills: 0 | Status: SHIPPED | OUTPATIENT
Start: 2024-10-14

## 2024-10-14 RX ORDER — WARFARIN SODIUM 5 MG/1
5 TABLET ORAL EVERY OTHER DAY
Start: 2024-10-14 | End: 2025-10-14

## 2024-10-14 NOTE — PROGRESS NOTES
"Chief Complaint:   Chronic Atrial Fibrillation     History Of Present Illness:    Kaden Olivas is a 84 y.o. male here with Chronic Atrial Fibrillation.  The patient has been asymptomatic.  The patient has been rate-controlled in atrial fibrillation on medical treatment which they are tolerating well and are compliant.  The current treatment strategy is rate control.  The CHADS2-VASC2 score is 4 and the ACC/AHA guidelines recommend anticoagulation for stroke prophylaxis.  The patient is being treated with Coumadin and has tolerated treatment with no bleeding and is compliant.     C/O LE edema. It has improved with wearing compression socks and taking lasix. Had a BNP drawn in 4/2024 which was mildly elevated at 176, not indicative of HF.  SOB unchanged.     Hx of 4 UTI.     TTE 10/17/2022  EF 50-55%  LA severely dilated  RA moderately dilated  Moderate tricuspid regurgitation  Small PFO    Nuclear Stress test 10/13/2022  Normal Stress ECG  No evidence of ischemia or scar        Allergies:  Patient has no known allergies.    Review of Systems  All pertinent systems have been reviewed and are negative except for what is stated in the history of present illness.    All other systems have been reviewed and are negative and noncontributory to this patient's current ailments.     Visit Vitals  /63   Pulse 83   Temp 36.1 °C (97 °F)   Ht 1.772 m (5' 9.75\")   Wt 121 kg (267 lb 9.6 oz)   BMI 38.67 kg/m²   Smoking Status Former   BSA 2.44 m²       Last Labs:  CBC -  Lab Results   Component Value Date    WBC 7.9 09/12/2024    HGB 11.4 (L) 09/12/2024    HCT 35.7 (L) 09/12/2024    MCV 97 09/12/2024     09/12/2024       CMP -  Lab Results   Component Value Date    CALCIUM 8.9 09/12/2024    PHOS 2.9 01/24/2023    PROT 6.4 09/12/2024    ALBUMIN 4.0 09/12/2024    AST 17 09/12/2024    ALT 20 09/12/2024    ALKPHOS 63 09/12/2024    BILITOT 1.2 09/12/2024       LIPID PANEL -   Lab Results   Component Value Date    CHOL 99 " "03/11/2024    TRIG 103 03/11/2024    HDL 40.5 03/11/2024    CHHDL 2.4 03/11/2024    LDLF 55 10/26/2022    VLDL 21 03/11/2024    NHDL 59 03/11/2024       RENAL FUNCTION PANEL -   Lab Results   Component Value Date    GLUCOSE 128 (H) 09/12/2024     09/12/2024    K 4.8 09/12/2024     09/12/2024    CO2 30 09/12/2024    ANIONGAP 10 09/12/2024    BUN 25 (H) 09/12/2024    CREATININE 1.04 09/12/2024    GFRMALE 90 08/18/2023    CALCIUM 8.9 09/12/2024    PHOS 2.9 01/24/2023    ALBUMIN 4.0 09/12/2024        Lab Results   Component Value Date     (H) 04/19/2024    HGBA1C 8.3 (H) 03/25/2024     Visit Vitals  /63   Pulse 83   Temp 36.1 °C (97 °F)   Ht 1.772 m (5' 9.75\")   Wt 121 kg (267 lb 9.6 oz)   BMI 38.67 kg/m²   Smoking Status Former   BSA 2.44 m²       Objective   Vitals reviewed.   Constitutional:       Appearance: Healthy appearance. Not in distress.   Neck:      Vascular: No carotid bruit or JVR. JVD normal.   Pulmonary:      Effort: Pulmonary effort is normal.      Breath sounds: Normal breath sounds. No wheezing. No rhonchi. No rales.   Chest:      Chest wall: Not tender to palpatation.   Cardiovascular:      PMI at left midclavicular line. Normal rate. Irregularly irregular rhythm. Normal S1. Normal S2.       Murmurs: There is no murmur.      No gallop.  No click. No rub.   Edema:     Peripheral edema absent.   Abdominal:      General: Bowel sounds are normal.      Palpations: Abdomen is soft.      Tenderness: There is no abdominal tenderness.   Musculoskeletal:         General: No tenderness. Skin:     General: Skin is warm and dry.   Neurological:      General: No focal deficit present.      Mental Status: Alert and oriented to person, place and time.   Psychiatric:         Attention and Perception: Attention normal.         Mood and Affect: Mood normal.         Speech: Speech normal.       Assessment/Plan   Diagnoses and all orders for this visit:  Chronic atrial fibrillation (CMS/HCC)  - " Rate controlled  - Asymptomatic  - Continue coumadin, INR 2-3  Benign essential hypertension  - well controlled  - no change   Hyperlipidemia, unspecified hyperlipidemia type  - statin  Nonrheumatic tricuspid valve regurgitation  - moderate per previous echo  - checking echo   SOB (shortness of breath) on exertion  - checking echo   Lower extremity edema  - tte  - bnp  - continue lasix   - if HF no sgl2 inhibitor due to hx of UTI     Follow up in 6 weeks with echo     Current Outpatient Medications:     amoxicillin (Amoxil) 500 mg capsule, TAKE FOUR CAPSULES BY MOUTH ONE HOUR BEFORE APPOINTMENT, Disp: , Rfl:     bicalutamide (Casodex) 50 mg tablet, Take 1 tablet (50 mg total) by mouth once daily.  Take at the same time every day., Disp: 14 tablet, Rfl: 0    cholecalciferol (Vitamin D-3) 50 mcg (2,000 unit) capsule, [facility] Vitamin D 2000 UNIT CAPS  Refills: 0     Active, Disp: , Rfl:     cyanocobalamin (Vitamin B-12) 100 mcg tablet, Take 1 tablet (100 mcg) by mouth once daily. As directed, Disp: , Rfl:     furosemide (Lasix) 40 mg tablet, Take 1 tablet (40 mg) by mouth once daily., Disp: 90 tablet, Rfl: 0    glimepiride (Amaryl) 2 mg tablet, TAKE 1 TABLET BY MOUTH ONCE DAILY IN THE MORNING BEFORE MEAL, Disp: 90 tablet, Rfl: 3    lisinopril 10 mg tablet, Take 1 tablet by mouth once daily, Disp: 90 tablet, Rfl: 3    metFORMIN (Glucophage) 500 mg tablet, Take 2 tablets by mouth twice daily, Disp: 360 tablet, Rfl: 1    metoprolol tartrate (Lopressor) 50 mg tablet, Take 1/2 (one-half) tablet by mouth twice daily, Disp: 90 tablet, Rfl: 3    oxyCODONE-acetaminophen (Percocet) 5-325 mg tablet, Take 1 tablet by mouth every 6 hours if needed., Disp: , Rfl:     simvastatin (Zocor) 20 mg tablet, Take 1 tablet by mouth once daily, Disp: 90 tablet, Rfl: 3    terazosin (Hytrin) 10 mg capsule, Take 1 capsule (10 mg) by mouth once daily., Disp: , Rfl:     vit C,T-Em-meoeq-lutein-zeaxan (PreserVision AREDS-2) 250-90-40-1 mg capsule, Take  by mouth., Disp: , Rfl:     warfarin (Coumadin) 5 mg tablet, Take 1 tablet (5 mg) by mouth every other day. Take 2 tab on Thrusday, Disp: , Rfl:     Exclusive of any other services or procedures performed, I, Lyssa CELESTE, spent 40 minutes in duration for this visit today.  This time consisted of chart review, obtaining history, and/or performing the exam as documented above, as well as, documenting the clinical information for the encounter in the electronic record, discussing treatment options, plans, and/or goals with patient, family, and/or caregiver, refilling medications, updating the electronic record, ordering medicines, lab work, imaging, referrals, and/or procedures as documented above and communicating with other [facility] professionals. I have discussed the results of laboratory, radiology, and cardiology studies with the patient and their family/caregiver.

## 2024-10-16 ENCOUNTER — LAB (OUTPATIENT)
Dept: LAB | Facility: LAB | Age: 84
End: 2024-10-16
Payer: MEDICARE

## 2024-10-16 ENCOUNTER — TELEPHONE (OUTPATIENT)
Dept: PRIMARY CARE | Facility: CLINIC | Age: 84
End: 2024-10-16

## 2024-10-16 DIAGNOSIS — E11.69 TYPE 2 DIABETES MELLITUS WITH OTHER SPECIFIED COMPLICATION, WITHOUT LONG-TERM CURRENT USE OF INSULIN: ICD-10-CM

## 2024-10-16 DIAGNOSIS — I48.20 CHRONIC ATRIAL FIBRILLATION (MULTI): ICD-10-CM

## 2024-10-16 DIAGNOSIS — R60.0 LOCALIZED EDEMA: ICD-10-CM

## 2024-10-16 DIAGNOSIS — R06.02 SHORTNESS OF BREATH: ICD-10-CM

## 2024-10-16 DIAGNOSIS — I36.1 NONRHEUMATIC TRICUSPID VALVE REGURGITATION: ICD-10-CM

## 2024-10-16 LAB
BNP SERPL-MCNC: 278 PG/ML (ref 0–99)
EST. AVERAGE GLUCOSE BLD GHB EST-MCNC: 160 MG/DL
HBA1C MFR BLD: 7.2 %
INR PPP: 2.5 (ref 0.9–1.1)
PROTHROMBIN TIME: 28.1 SECONDS (ref 9.8–12.8)

## 2024-10-16 PROCEDURE — 85610 PROTHROMBIN TIME: CPT

## 2024-10-16 PROCEDURE — 36415 COLL VENOUS BLD VENIPUNCTURE: CPT

## 2024-10-16 PROCEDURE — 83036 HEMOGLOBIN GLYCOSYLATED A1C: CPT

## 2024-10-16 PROCEDURE — 83880 ASSAY OF NATRIURETIC PEPTIDE: CPT

## 2024-10-16 NOTE — TELEPHONE ENCOUNTER
----- Message from Ivan Parrish sent at 10/16/2024  4:20 PM EDT -----  Let patient know INR good range recheck 1 month  A1c continues to improve.  Continue on current medications and recheck 3 to 6 months

## 2024-11-18 ENCOUNTER — TELEPHONE (OUTPATIENT)
Dept: UROLOGY | Facility: CLINIC | Age: 84
End: 2024-11-18
Payer: MEDICARE

## 2024-11-18 NOTE — TELEPHONE ENCOUNTER
Patient scheduled for Lupron in December, do you still want him to take the Casodex prior, if so can you please send to the   [facility] in [place]  Thank you

## 2024-11-25 ENCOUNTER — APPOINTMENT (OUTPATIENT)
Dept: CARDIOLOGY | Facility: CLINIC | Age: 84
End: 2024-11-25
Payer: MEDICARE

## 2024-11-25 ENCOUNTER — HOSPITAL ENCOUNTER (OUTPATIENT)
Dept: CARDIOLOGY | Facility: CLINIC | Age: 84
Discharge: HOME | End: 2024-11-25
Payer: MEDICARE

## 2024-11-25 VITALS
DIASTOLIC BLOOD PRESSURE: 53 MMHG | HEART RATE: 84 BPM | HEIGHT: 69 IN | SYSTOLIC BLOOD PRESSURE: 98 MMHG | WEIGHT: 255 LBS | BODY MASS INDEX: 37.77 KG/M2

## 2024-11-25 DIAGNOSIS — R60.0 LOCALIZED EDEMA: ICD-10-CM

## 2024-11-25 DIAGNOSIS — I36.1 NONRHEUMATIC TRICUSPID VALVE REGURGITATION: ICD-10-CM

## 2024-11-25 DIAGNOSIS — R06.02 SHORTNESS OF BREATH: ICD-10-CM

## 2024-11-25 DIAGNOSIS — I48.20 CHRONIC ATRIAL FIBRILLATION (MULTI): ICD-10-CM

## 2024-11-25 DIAGNOSIS — E78.5 HYPERLIPIDEMIA, UNSPECIFIED HYPERLIPIDEMIA TYPE: ICD-10-CM

## 2024-11-25 DIAGNOSIS — I48.91 UNSPECIFIED ATRIAL FIBRILLATION (MULTI): ICD-10-CM

## 2024-11-25 DIAGNOSIS — I50.32 CHRONIC DIASTOLIC HEART FAILURE: Primary | ICD-10-CM

## 2024-11-25 DIAGNOSIS — I10 BENIGN ESSENTIAL HYPERTENSION: ICD-10-CM

## 2024-11-25 LAB
AORTIC VALVE PEAK VELOCITY: 1.39 M/S
AV PEAK GRADIENT: 8 MMHG
AVA (PEAK VEL): 1.87 CM2
EJECTION FRACTION: 58 %
LEFT ATRIUM VOLUME AREA LENGTH INDEX BSA: 63.4 ML/M2
LEFT VENTRICLE INTERNAL DIMENSION DIASTOLE: 5.34 CM (ref 3.5–6)
LEFT VENTRICULAR OUTFLOW TRACT DIAMETER: 2.04 CM
RIGHT VENTRICLE FREE WALL PEAK S': 12 CM/S
RIGHT VENTRICLE PEAK SYSTOLIC PRESSURE: 51.5 MMHG
TRICUSPID ANNULAR PLANE SYSTOLIC EXCURSION: 2.4 CM

## 2024-11-25 PROCEDURE — 93306 TTE W/DOPPLER COMPLETE: CPT | Performed by: INTERNAL MEDICINE

## 2024-11-25 PROCEDURE — 3078F DIAST BP <80 MM HG: CPT | Performed by: NURSE PRACTITIONER

## 2024-11-25 PROCEDURE — 1036F TOBACCO NON-USER: CPT | Performed by: NURSE PRACTITIONER

## 2024-11-25 PROCEDURE — 2500000004 HC RX 250 GENERAL PHARMACY W/ HCPCS (ALT 636 FOR OP/ED): Performed by: NURSE PRACTITIONER

## 2024-11-25 PROCEDURE — 1159F MED LIST DOCD IN RCRD: CPT | Performed by: NURSE PRACTITIONER

## 2024-11-25 PROCEDURE — 1160F RVW MEDS BY RX/DR IN RCRD: CPT | Performed by: NURSE PRACTITIONER

## 2024-11-25 PROCEDURE — 99214 OFFICE O/P EST MOD 30 MIN: CPT | Performed by: NURSE PRACTITIONER

## 2024-11-25 PROCEDURE — 3074F SYST BP LT 130 MM HG: CPT | Performed by: NURSE PRACTITIONER

## 2024-11-25 PROCEDURE — C8929 TTE W OR WO FOL WCON,DOPPLER: HCPCS

## 2024-11-25 PROCEDURE — 1123F ACP DISCUSS/DSCN MKR DOCD: CPT | Performed by: NURSE PRACTITIONER

## 2024-11-25 NOTE — PROGRESS NOTES
"Chief Complaint:   Chronic Atrial Fibrillation     History Of Present Illness:    Kaden Olivas is a 84 y.o. male here with Chronic Atrial Fibrillation.  The patient has been asymptomatic.  The patient has been rate-controlled in atrial fibrillation on medical treatment which they are tolerating well and are compliant.  The current treatment strategy is rate control.  The CHADS2-VASC2 score is 4 and the ACC/AHA guidelines recommend anticoagulation for stroke prophylaxis.  The patient is being treated with Coumadin and has tolerated treatment with no bleeding and is compliant.     C/O LE edema. It has improved with wearing compression socks and taking lasix. Had a BNP drawn in 4/2024 which was mildly elevated at 176, not indicative of HF. BNP, 10/16/24, was increased at 278. SOB unchanged. He is here for an echo.     He does note SOB, but only with extreme exertion (IE climbing a hill). He denies having it with ADL and walking.     Hx of 4 UTI.     TTE 10/17/2022  EF 50-55%  LA severely dilated  RA moderately dilated  Moderate tricuspid regurgitation  Small PFO    Nuclear Stress test 10/13/2022  Normal Stress ECG  No evidence of ischemia or scar        Allergies:  Patient has no known allergies.    Review of Systems  All pertinent systems have been reviewed and are negative except for what is stated in the history of present illness.    All other systems have been reviewed and are negative and noncontributory to this patient's current ailments.     Visit Vitals  BP 98/53 (BP Location: Right arm, Patient Position: Sitting, BP Cuff Size: Large adult)   Pulse 84   Ht 1.753 m (5' 9\")   Wt 116 kg (255 lb)   BMI 37.66 kg/m²   Smoking Status Former   BSA 2.38 m²       Last Labs:  CBC -  Lab Results   Component Value Date    WBC 7.9 09/12/2024    HGB 11.4 (L) 09/12/2024    HCT 35.7 (L) 09/12/2024    MCV 97 09/12/2024     09/12/2024       CMP -  Lab Results   Component Value Date    CALCIUM 8.9 09/12/2024    PHOS " 2.9 01/24/2023    PROT 6.4 09/12/2024    ALBUMIN 4.0 09/12/2024    AST 17 09/12/2024    ALT 20 09/12/2024    ALKPHOS 63 09/12/2024    BILITOT 1.2 09/12/2024       LIPID PANEL -   Lab Results   Component Value Date    CHOL 99 03/11/2024    TRIG 103 03/11/2024    HDL 40.5 03/11/2024    CHHDL 2.4 03/11/2024    LDLF 55 10/26/2022    VLDL 21 03/11/2024    NHDL 59 03/11/2024       RENAL FUNCTION PANEL -   Lab Results   Component Value Date    GLUCOSE 128 (H) 09/12/2024     09/12/2024    K 4.8 09/12/2024     09/12/2024    CO2 30 09/12/2024    ANIONGAP 10 09/12/2024    BUN 25 (H) 09/12/2024    CREATININE 1.04 09/12/2024    GFRMALE 90 08/18/2023    CALCIUM 8.9 09/12/2024    PHOS 2.9 01/24/2023    ALBUMIN 4.0 09/12/2024        Lab Results   Component Value Date     (H) 10/16/2024    HGBA1C 7.2 (H) 10/16/2024     Visit Vitals  Smoking Status Former       Objective   Vitals reviewed.   Constitutional:       Appearance: Healthy appearance. Not in distress.   Neck:      Vascular: No carotid bruit or JVR. JVD normal.   Pulmonary:      Effort: Pulmonary effort is normal.      Breath sounds: Normal breath sounds. No wheezing. No rhonchi. No rales.   Chest:      Chest wall: Not tender to palpatation.   Cardiovascular:      PMI at left midclavicular line. Normal rate. Irregularly irregular rhythm. Normal S1. Normal S2.       Murmurs: There is no murmur.      No gallop.  No click. No rub.   Edema:     Peripheral edema absent.   Abdominal:      General: Bowel sounds are normal.      Palpations: Abdomen is soft.      Tenderness: There is no abdominal tenderness.   Musculoskeletal:         General: No tenderness. Skin:     General: Skin is warm and dry.   Neurological:      General: No focal deficit present.      Mental Status: Alert and oriented to person, place and time.   Psychiatric:         Attention and Perception: Attention normal.         Mood and Affect: Mood normal.         Speech: Speech normal.        Assessment/Plan   Diagnoses and all orders for this visit:  Chronic Diastolic Heart Failure  -   - continue lasix, lisinopril and metoprolol  - BP will not tolerate entresto  - Cannot start Sg12 inhibitors due to a history of frequent UTIs   Chronic atrial fibrillation (CMS/HCC)  - Rate controlled  - Asymptomatic  - Continue coumadin, INR 2-3  Benign essential hypertension  - well controlled  - no change   Hyperlipidemia, unspecified hyperlipidemia type  - statin  Nonrheumatic tricuspid valve regurgitation  - moderate per previous echo  - checking echo   SOB (shortness of breath) on exertion  - right side of heart dilated/enlarged, I suspect some underlying lung disease. History of smoking and steel . Since SOB does not impact his daily life he does not want to pursue a pulmonary consult   Lower extremity edema  - well controlled with lasix and compression socks     Follow up in 6 months     Outpatient Medications:  Current Outpatient Medications   Medication Instructions    amoxicillin (Amoxil) 500 mg capsule TAKE FOUR CAPSULES BY MOUTH ONE HOUR BEFORE APPOINTMENT    bicalutamide (CASODEX) 50 mg, oral, Daily, Take at the same time every day.    cholecalciferol (Vitamin D-3) 50 mcg (2,000 unit) capsule [facility] Vitamin D 2000 UNIT CAPS   Refills: 0       Active    cyanocobalamin (Vitamin B-12) 100 mcg tablet 1 tablet, Daily    furosemide (LASIX) 40 mg, oral, Daily    glimepiride (AMARYL) 2 mg, oral, Daily before breakfast    lisinopril 10 mg, oral, Daily    metFORMIN (GLUCOPHAGE) 1,000 mg, oral, 2 times daily    metoprolol tartrate (Lopressor) 50 mg tablet oral, 2 times daily    oxyCODONE-acetaminophen (Percocet) 5-325 mg tablet 1 tablet, Every 6 hours PRN    simvastatin (ZOCOR) 20 mg, oral, Daily    terazosin (HYTRIN) 10 mg, Daily    vit C,X-Cd-pmzqc-lutein-zeaxan (PreserVision AREDS-2) 250-90-40-1 mg capsule Take by mouth.    warfarin (COUMADIN) 5 mg, oral, Every other day, Take 2 tab on  Thrusday       I reviewed TTE, previous pcp messages and labs    Exclusive of any other services or procedures performed, I, Lyssa CELESTE, spent 26 minutes in duration for this visit today.  This time consisted of chart review, obtaining history, and/or performing the exam as documented above, as well as, documenting the clinical information for the encounter in the electronic record, discussing treatment options, plans, and/or goals with patient, family, and/or caregiver, refilling medications, updating the electronic record, ordering medicines, lab work, imaging, referrals, and/or procedures as documented above and communicating with other [facility] professionals. I have discussed the results of laboratory, radiology, and cardiology studies with the patient and their family/caregiver.

## 2024-11-27 ENCOUNTER — LAB (OUTPATIENT)
Dept: LAB | Facility: LAB | Age: 84
End: 2024-11-27
Payer: MEDICARE

## 2024-11-27 DIAGNOSIS — C61 PROSTATE CANCER (MULTI): ICD-10-CM

## 2024-11-27 DIAGNOSIS — I48.20 CHRONIC ATRIAL FIBRILLATION (MULTI): ICD-10-CM

## 2024-11-27 LAB
INR PPP: 1.7 (ref 0.9–1.1)
PROTHROMBIN TIME: 19.7 SECONDS (ref 9.8–12.8)
PSA SERPL-MCNC: 0.09 NG/ML

## 2024-11-27 PROCEDURE — 84153 ASSAY OF PSA TOTAL: CPT

## 2024-11-27 PROCEDURE — 36415 COLL VENOUS BLD VENIPUNCTURE: CPT

## 2024-11-27 PROCEDURE — 85610 PROTHROMBIN TIME: CPT

## 2024-12-02 ENCOUNTER — APPOINTMENT (OUTPATIENT)
Dept: UROLOGY | Facility: CLINIC | Age: 84
End: 2024-12-02
Payer: MEDICARE

## 2024-12-06 ENCOUNTER — LAB (OUTPATIENT)
Dept: LAB | Facility: LAB | Age: 84
End: 2024-12-06
Payer: MEDICARE

## 2024-12-06 DIAGNOSIS — I48.20 CHRONIC ATRIAL FIBRILLATION (MULTI): ICD-10-CM

## 2024-12-06 LAB
INR PPP: 2.3 (ref 0.9–1.1)
PROTHROMBIN TIME: 26.7 SECONDS (ref 9.8–12.8)

## 2024-12-06 PROCEDURE — 85610 PROTHROMBIN TIME: CPT

## 2024-12-06 PROCEDURE — 36415 COLL VENOUS BLD VENIPUNCTURE: CPT

## 2024-12-12 ENCOUNTER — PROCEDURE VISIT (OUTPATIENT)
Dept: UROLOGY | Facility: CLINIC | Age: 84
End: 2024-12-12
Payer: MEDICARE

## 2024-12-12 ENCOUNTER — APPOINTMENT (OUTPATIENT)
Dept: VASCULAR SURGERY | Facility: CLINIC | Age: 84
End: 2024-12-12
Payer: MEDICARE

## 2024-12-12 VITALS
HEART RATE: 84 BPM | WEIGHT: 261 LBS | SYSTOLIC BLOOD PRESSURE: 78 MMHG | HEIGHT: 70 IN | BODY MASS INDEX: 37.37 KG/M2 | DIASTOLIC BLOOD PRESSURE: 48 MMHG

## 2024-12-12 DIAGNOSIS — E11.69 TYPE 2 DIABETES MELLITUS WITH OTHER SPECIFIED COMPLICATION, WITHOUT LONG-TERM CURRENT USE OF INSULIN: ICD-10-CM

## 2024-12-12 DIAGNOSIS — C61 PROSTATE CANCER (MULTI): Primary | ICD-10-CM

## 2024-12-12 DIAGNOSIS — R97.20 ELEVATED PSA: ICD-10-CM

## 2024-12-12 LAB
POC APPEARANCE, URINE: CLEAR
POC BILIRUBIN, URINE: NEGATIVE
POC BLOOD, URINE: NEGATIVE
POC COLOR, URINE: YELLOW
POC GLUCOSE, URINE: NEGATIVE MG/DL
POC KETONES, URINE: ABNORMAL MG/DL
POC LEUKOCYTES, URINE: NEGATIVE
POC NITRITE,URINE: NEGATIVE
POC PH, URINE: 5.5 PH
POC PROTEIN, URINE: ABNORMAL MG/DL
POC SPECIFIC GRAVITY, URINE: 1.02
POC UROBILINOGEN, URINE: 0.2 EU/DL

## 2024-12-12 PROCEDURE — 99213 OFFICE O/P EST LOW 20 MIN: CPT | Performed by: UROLOGY

## 2024-12-12 PROCEDURE — 96402 CHEMO HORMON ANTINEOPL SQ/IM: CPT | Performed by: UROLOGY

## 2024-12-12 PROCEDURE — 81003 URINALYSIS AUTO W/O SCOPE: CPT | Performed by: UROLOGY

## 2024-12-12 NOTE — PROGRESS NOTES
12/12/2024  Prostate cancer status post IMRT rising PSA and for second Lupron shot     No hot flash     We discussed continue Lupron injection  We discussed PSA checked every 6 months  All the questions were answered, the patient expressed understanding and agreed to the plan.        Pt given Lupron 45 mg in left hip with no reactions.       Impression  UTI  Dysuria  Prostate cancer [place] 7, status post IMRT  Rising PSA  BPH  ED     Plan  Continue Lupron injection in 6 months  Liu continue terazosin to 10 mg daily  PSA now and in 6 months  May consider intermittent Lupron injection after next shot    PSA   11/27/2024 0.09  6/10/2024 0.07    Chief Complaint   Patient presents with    Prostate Cancer     Patient is here today for 6 month lupron injection.  He denies any issues voiding at this time.        Physical Exam     TODAYS LAB RESULTS:    Glucose, Urine  NEGATIVE mg/dl NEGATIVE NEGATIVE NEGATIVE NEGATIVE   POC Bilirubin, Urine  NEGATIVE NEGATIVE NEGATIVE SMALL (1+) Abnormal  NEGATIVE   POC Ketones, Urine  NEGATIVE mg/dl TRACE Abnormal  NEGATIVE TRACE Abnormal  NEGATIVE   POC Specific Gravity, Urine  1.005 - 1.035 1.025 1.020 1.025 1.020   POC Blood, Urine  NEGATIVE NEGATIVE NEGATIVE NEGATIVE LARGE (3+) Abnormal    POC PH, Urine  No Reference Range Established PH 5.5 5.5 5.5 7.0   POC Protein, Urine  NEGATIVE, 30 (1+) mg/dl 30 (1+) NEGATIVE TRACE Abnormal  300 (3+) Abnormal    POC Urobilinogen, Urine  0.2, 1.0 EU/DL 0.2 0.2 1.0 1.0   Poc Nitrite, Urine  NEGATIVE NEGATIVE NEGATIVE NEGATIVE NEGATIVE   POC Leukocytes, Urine  NEGATIVE NEGATIVE NEGATIVE TRACE Abnormal  SMALL (1+) Abnormal        Lab Results   Component Value Date    PSA 0.09 11/27/2024    PSA 0.07 06/10/2024    PSA 2.74 11/01/2023      ASSESSMENT&PLAN:      IMPRESSIONS:    06/10/2024  Prostate cancer status post IMRT rising PSA and for second Lupron shot     No hot flash     We discussed continue Lupron injection  We discussed PSA checked every 6  months  All the questions were answered, the patient expressed understanding and agreed to the plan.        Pt given Lupron 45 mg in right hip with no reactions.  Tpotts NR-CMA     Impression  UTI  Dysuria  Prostate cancer [place] 7, status post IMRT  Rising PSA  BPH  ED     Plan  Continue Lupron injection every 6 months  Liu continue terazosin to 10 mg daily  PSA now and in 6 months          Chief Complaint   Patient presents with    Prostate Cancer       Patient here for his second 6 month Lupron injection. He is voiding well.          Physical Exam      TODAYS LAB RESULTS:     No recent PSA.     POC Glucose, Urine  NEGATIVE mg/dl NEGATIVE   POC Bilirubin, Urine  NEGATIVE NEGATIVE   POC Ketones, Urine  NEGATIVE mg/dl NEGATIVE   POC Specific Gravity, Urine  1.005 - 1.035 1.020   POC Blood, Urine  NEGATIVE NEGATIVE   POC PH, Urine  No Reference Range Established PH 5.5   POC Protein, Urine  NEGATIVE, 30 (1+) mg/dl NEGATIVE   POC Urobilinogen, Urine  0.2, 1.0 EU/DL 0.2   Poc Nitrite, Urine  NEGATIVE NEGATIVE   POC Leukocytes, Urine  NEGATIVE NEGATIVE      ASSESSMENT&PLAN:        IMPRESSIONS:      12/21/2023  Patient here for first Lupron injection.  Started Casodex 7 days ago     Patient started Hytrin 10 mg daily, working better     We discussed prostate cancer status post IMRT, rising PSA  We discussed the Lupron injection Casodex, indication risk-benefit and alternative  We discussed UTI urine culture  We discussed BPH, increased terazosin to 10 mg daily  All the questions were answered, the patient expressed understanding and agreed to the plan.     Impression  UTI  Dysuria  Prostate cancer [place] 7, status post IMRT  Rising PSA  BPH  ED     Plan  Lupron injection today  Finish Casodex 50 mg daily for 2 weeks,   Liu continue terazosin to 10 mg daily  Will see patient at second Lupron injection appointment        Patient here today for Lupron injection. Lupron injection prepared per manufacturers  instructions. Patient's skin prepped with alcohol and administered Lupron 45mg IM into LT dorsal glut per MELVIN Campos. Patient advised to report swelling, pain, and or any issues with injection site. Patient is to return in months for next injection.  Audelia Torres LPN                   Chief Complaint   Patient presents with    Prostate Cancer       Patient here for first Lupron injection. Patient took Casodex 50 mg for 7 days before this appointment, as instructed.          Physical Exam      TODAYS LAB RESULTS:        ASSESSMENT&PLAN:        IMPRESSIONS:      12/04/2023  Complaining UTI episodes, dysuria finished antibiotics.     Patient has no nausea, no vomiting, no fever.     NATI: Deferred     PSA rising     We discussed prostate cancer status post IMRT, rising PSA  We discussed the Lupron injection Casodex, indication risk-benefit and alternative  We discussed UTI urine culture  We discussed BPH, increased terazosin to 10 mg daily  All the questions were answered, the patient expressed understanding and agreed to the plan.     Impression  UTI  Dysuria  Prostate cancer [place] 7, status post IMRT  Rising PSA  BPH  ED     Plan  Lupron injection month for 2 years  Casodex 50 mg daily for 2 weeks, start 7 days before first Lupron  Urine culture  Change terazosin to 10 mg daily  Will see patient a first Lupron injection appointment             Chief Complaint   Patient presents with    Benign Prostatic Hypertrophy       Voiding well.     Prostate Cancer       Denies weight changes and bone pain.     Recurrent UTI       Patient claims he has had 5 UTIs since August.          Physical Exam      TODAYS LAB RESULTS:     PSA 11/01/2023  2.74  PSA 08/02/2023  2.31     POC Glucose, Urine  NEGATIVE mg/dl NEGATIVE     POC Bilirubin, Urine  NEGATIVE SMALL (1+) Abnormal      POC Ketones, Urine  NEGATIVE mg/dl TRACE Abnormal      POC Specific Gravity, Urine  1.005 - 1.035 1.025     POC Blood, Urine  NEGATIVE NEGATIVE     POC  PH, Urine  No Reference Range Established PH 5.5     POC Protein, Urine  NEGATIVE, 30 (1+) mg/dl TRACE Abnormal      POC Urobilinogen, Urine  0.2, 1.0 EU/DL 1.0     Poc Nitrite, Urine  NEGATIVE NEGATIVE     POC Leukocytes, Urine  NEGATIVE TRACE Abnormal         ASSESSMENT&PLAN:        IMPRESSIONS:     05/19/2023  Voiding well, no weight loss no bone pain no poor appetite     Patient has no nausea, no vomiting, no fever.     NATI: Deferred     PSA slowly rising 1.81     Patient here for 6 month follow up for prostate Ca, BPH and ED   Last PSA done 04/27/23 1.81  Previous PSA done 02/01/23 1.69  He does get 2-3 times a night. He does have frequency during the day. Complains of urgency ad weak stream. He does not feel that he empties bladder well.   PVR: 43ml --Argelia     IO Glucose - Urine Negative REQUIRED    IO Bilirubin Negative REQUIRED    IO Ketones Negative REQUIRED    IO Specific Gravity 1.020 REQUIRED    IO Blood Negative REQUIRED    IO pH 5.5 REQUIRED    IO Protein, Urine Negative REQUIRED    IO Urobilinogen Normal (0.2-1.0 mg/dl) REQUIRED    IO Nitrite, Urine Negative REQUIRED    IO Leukocytes Negative      We discussed the prostate cancer status post IMRT, rising PSA, continue monitoring PSA and possible Lupron injection if PSA rising above 2.0;  We discussed erectile dysfunction, failed medications, possible penile injection  All the questions were answered, the patient expressed understanding and agreed to the plan.     Impression  Prostate cancer [place] 7, status post IMRT  Rising PSA  BPH  ED     Plan  PSA in 3 months, 6 months  Appointment in 6 months  Possible Lupron injection in the future        11/18/2022  83-year-old gentleman history of prostate cancer [place] 7, status post IMRT, slowly rising PSA up to 1.56; also complained ED, failed medications; dysuria, on terazosin 5 mg daily     Patient has no nausea, no vomiting, no fever.     NATI: Deferred     PSA: 1.56     New patient here today to  re-establish with urology for follow up on Prostate Cancer, ED, BPH, hx of radiation therapy. He previously was seeing Dr. Davina Camacho, last seen 5/6/22. [place] score 7 (4+3). He was to continue Terazosin 5mg daily.  PSA 10/26/22 1.56  PSA 5/2/22 1.25  Prostate biopsy done 4/16/14 per Dr. Jeff Casper---FINAL DIAGNOSIS  A. PROSTATE, RIGHT SIDE, BIOPSIES:  --ADENOCARCINOMA, MIRYAM SCORE 7 (4+3), INVOLVING APPROXIMATELY 50% OF THE  SPECIMEN WITH PERINEURAL INVASION  B. PROSTATE, LEFT SIDE, BIOPSIES:  --BENIGN PROSTATIC TISSUE     Patient denies dysuria, burning, hematuria. He has nocturia x2 depending on liquid intake.  -JMorgenstern CMA     IO Glucose - Urine Negative REQUIRED    IO Bilirubin Negative REQUIRED    IO Ketones Negative REQUIRED    IO Specific Gravity 1.025 REQUIRED    IO Blood Negative REQUIRED    IO pH 6.0 REQUIRED    IO Protein, Urine (+)30 REQUIRED    IO Urobilinogen 1 mg/dl REQUIRED    IO Nitrite, Urine Negative REQUIRED    IO Leukocytes Negative      We discussed the prostate cancer status post IMRT, rising PSA, continue monitoring PSA and possible Lupron injection if PSA rising above 2.0;  We discussed erectile dysfunction, failed medications, possible penile injection  We discussed benign prostate hypertrophy, possible PVP laser prostatectomy  All the questions were answered, the patient expressed understanding and agreed to the plan.     Impression  Prostate cancer [place] 7, status post IMRT  BPH  ED     Plan  PSA in 3 months, 6 months  Appointment in 6 months           5/6/2023 Davina Camacho  Prostate Cancer, clinical stage T2a NX M0, [place]  score 7 (4 + 3), initial PSA 6.0 mg/mL, group IIA  completed xrt between the dates of July 1, 2014,  and August 8, 2014  PSA is stable     ED, no chest pain or nitro use     recommend physical therapy with a manual vacuum erection device  1. to use the pump for physical therapy every other day-pump up, hold for 20-30 sec, then repeat 5-6 times. In 6-8  weeks of consistent use they will see results of increased length and girth  2. may use BRAD for intercourse with or without constriction ring  3. may use constriction ring alone if they are able to obtain the erection but cannot sustain the erection     Contraindicated in men on blood thinners or problems with bruising  Recommend purchasing at [address] locations:  [address] [place] [address] [phone]  Hours Mon-Sat 10 am-Midnight  Sun Noon-10pm     urinary frequency and nocturia  diabetic, may be causing overactivity of the bladder  on terazosin 5 mg daily, may increase dose  good response to oxybutynin 10 mg ER daily. but had significant dry mouth and stopped this medication  denies glaucoma     return to clinic in 6 months with PSA prior           PSA   11/27/2024 0.09  6/10/2024 0.07  11/01/2023  2.74  08/02/2023  2.31  04/27/23 1.81  02/01/23 1.69  10/26/22 1.56   5/2/22 1.25        Surgery    July 1, 2014, IMRT

## 2024-12-13 RX ORDER — METFORMIN HYDROCHLORIDE 500 MG/1
1000 TABLET ORAL 2 TIMES DAILY
Qty: 360 TABLET | Refills: 0 | Status: SHIPPED | OUTPATIENT
Start: 2024-12-13

## 2025-01-20 ENCOUNTER — APPOINTMENT (OUTPATIENT)
Dept: PRIMARY CARE | Facility: CLINIC | Age: 85
End: 2025-01-20
Payer: MEDICARE

## 2025-02-03 ENCOUNTER — APPOINTMENT (OUTPATIENT)
Dept: PRIMARY CARE | Facility: CLINIC | Age: 85
End: 2025-02-03
Payer: MEDICARE

## 2025-02-03 VITALS
BODY MASS INDEX: 37.74 KG/M2 | DIASTOLIC BLOOD PRESSURE: 70 MMHG | WEIGHT: 263 LBS | SYSTOLIC BLOOD PRESSURE: 130 MMHG | TEMPERATURE: 97.6 F

## 2025-02-03 DIAGNOSIS — E66.812 CLASS 2 SEVERE OBESITY DUE TO EXCESS CALORIES WITH SERIOUS COMORBIDITY AND BODY MASS INDEX (BMI) OF 38.0 TO 38.9 IN ADULT: ICD-10-CM

## 2025-02-03 DIAGNOSIS — L98.499 NON-PRESSURE CHRONIC ULCER OF SKIN OF OTHER SITES WITH UNSPECIFIED SEVERITY (MULTI): ICD-10-CM

## 2025-02-03 DIAGNOSIS — I48.20 CHRONIC ATRIAL FIBRILLATION (MULTI): Primary | ICD-10-CM

## 2025-02-03 DIAGNOSIS — I50.32 CHRONIC DIASTOLIC HEART FAILURE: ICD-10-CM

## 2025-02-03 DIAGNOSIS — M17.11 ARTHRITIS OF RIGHT KNEE: ICD-10-CM

## 2025-02-03 DIAGNOSIS — C61 PROSTATE CANCER (MULTI): ICD-10-CM

## 2025-02-03 DIAGNOSIS — E11.69 TYPE 2 DIABETES MELLITUS WITH OTHER SPECIFIED COMPLICATION, WITHOUT LONG-TERM CURRENT USE OF INSULIN: ICD-10-CM

## 2025-02-03 DIAGNOSIS — E66.01 CLASS 2 SEVERE OBESITY DUE TO EXCESS CALORIES WITH SERIOUS COMORBIDITY AND BODY MASS INDEX (BMI) OF 38.0 TO 38.9 IN ADULT: ICD-10-CM

## 2025-02-03 PROCEDURE — 1123F ACP DISCUSS/DSCN MKR DOCD: CPT | Performed by: FAMILY MEDICINE

## 2025-02-03 PROCEDURE — 3075F SYST BP GE 130 - 139MM HG: CPT | Performed by: FAMILY MEDICINE

## 2025-02-03 PROCEDURE — 1036F TOBACCO NON-USER: CPT | Performed by: FAMILY MEDICINE

## 2025-02-03 PROCEDURE — 1159F MED LIST DOCD IN RCRD: CPT | Performed by: FAMILY MEDICINE

## 2025-02-03 PROCEDURE — 99214 OFFICE O/P EST MOD 30 MIN: CPT | Performed by: FAMILY MEDICINE

## 2025-02-03 PROCEDURE — 1160F RVW MEDS BY RX/DR IN RCRD: CPT | Performed by: FAMILY MEDICINE

## 2025-02-03 PROCEDURE — 3078F DIAST BP <80 MM HG: CPT | Performed by: FAMILY MEDICINE

## 2025-02-03 ASSESSMENT — ENCOUNTER SYMPTOMS
NERVOUS/ANXIOUS: 0
VOMITING: 0
BRUISES/BLEEDS EASILY: 1
DYSPHORIC MOOD: 0
SHORTNESS OF BREATH: 0
WEAKNESS: 0
APPETITE CHANGE: 0
SLEEP DISTURBANCE: 0
ARTHRALGIAS: 0
ADENOPATHY: 0
FEVER: 0
MYALGIAS: 0
BLOOD IN STOOL: 0
PALPITATIONS: 0
CONSTIPATION: 0
HEADACHES: 0
COUGH: 0
EYE PAIN: 0
NAUSEA: 0
SINUS PAIN: 0
DIARRHEA: 0
DIFFICULTY URINATING: 0
ACTIVITY CHANGE: 0
ABDOMINAL PAIN: 0

## 2025-02-03 NOTE — PROGRESS NOTES
"Subjective   Patient ID: Cade Olivas \"Jack\" is a 84 y.o. male who presents for Follow-up.    [facility]   CARE TEAM  urology- Tirso Camacho follows for erectile dysfunction, BPH as well as prostate cancer status post radiation therapy in 2014. on lupron- has done 3 out of 4 so far  ortho- Silver -follows as needed for knee replacement 10/18  vascular-may thurner ?  Cardio- Mike/Oscar- appt 11/24 CD[facility], NRTVR    Last week he had left-sided carpal tunnel release as well as cubital tunnel release  He is recovering well and will be doing therapy through [facility]  His Coumadin was stopped 5 days prior and has not had any issues restarting.  Plans to do INR in about a week or so       Compression stockings are helping swelling      Taking all meds as directed     Anemia - chronic and stable. No bleeding. Declines work up   Review of Systems   Constitutional:  Negative for activity change, appetite change and fever.   HENT:  Negative for congestion, ear pain and sinus pain.    Eyes:  Negative for pain and visual disturbance.   Respiratory:  Negative for cough and shortness of breath.    Cardiovascular:  Negative for chest pain, palpitations and leg swelling.   Gastrointestinal:  Negative for abdominal pain, blood in stool, constipation, diarrhea, nausea and vomiting.   Endocrine: Negative for cold intolerance and heat intolerance.   Genitourinary:  Negative for difficulty urinating.   Musculoskeletal:  Negative for arthralgias, gait problem and myalgias.   Skin:  Negative for rash.   Neurological:  Negative for weakness and headaches.   Hematological:  Negative for adenopathy. Bruises/bleeds easily.   Psychiatric/Behavioral:  Negative for dysphoric mood and sleep disturbance. The patient is not nervous/anxious.        Objective   /70   Temp 36.4 °C (97.6 °F)   Wt 119 kg (263 lb)   BMI 37.74 kg/m²     Physical Exam  Vitals and nursing note reviewed.   Constitutional:       Appearance: Normal appearance.   HENT: "      Head: Normocephalic and atraumatic.      Nose: Nose normal.      Mouth/Throat:      Mouth: Mucous membranes are moist.   Eyes:      Pupils: Pupils are equal, round, and reactive to light.   Cardiovascular:      Rate and Rhythm: Normal rate. Rhythm irregular.      Pulses: Normal pulses.      Heart sounds: Normal heart sounds.   Pulmonary:      Effort: Pulmonary effort is normal.      Breath sounds: Normal breath sounds.   Musculoskeletal:         General: No swelling. Normal range of motion.      Cervical back: Normal range of motion and neck supple.      Right lower leg: Edema (mild) present.      Left lower leg: Edema (mild) present.      Comments: Left wrist and elbow in Ace bandages status post operation 1/31.  Dressings are clean dry and intact   Skin:     Capillary Refill: Capillary refill takes less than 2 seconds.      Findings: Bruising (on forearms) present.   Neurological:      General: No focal deficit present.      Mental Status: He is alert. Mental status is at baseline.   Psychiatric:         Mood and Affect: Mood normal.         Behavior: Behavior normal.         Thought Content: Thought content normal.         Judgment: Judgment normal.         Assessment/Plan   1. Chronic atrial fibrillation (Multi)  Disability Placard    Protime-INR    Protime-INR      2. Arthritis of right knee  Disability Placard      3. Type 2 diabetes mellitus with other specified complication, without long-term current use of insulin  Hemoglobin A1C    Comprehensive Metabolic Panel    Hemoglobin A1C    Comprehensive Metabolic Panel      4. 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 Chronic diastolic heart failure        6. Prostate cancer (Multi)        7. Class 2 severe obesity due to excess calories with serious comorbidity and body mass index (BMI) of 38.0 to 38.9 in adult        He will continue to follow with care team as listed above  We discussed checking INR next  week as well as A1c, cbc and CMP which are due  Will hold off on urine microalbumin as obtaining urine difficult now with his bandages on his arm   patient was identified as a fall risk. Risk prevention instructions provided.  Ivan Parrish, DO

## 2025-02-03 NOTE — PATIENT INSTRUCTIONS

## 2025-02-13 DIAGNOSIS — E11.69 TYPE 2 DIABETES MELLITUS WITH OTHER SPECIFIED COMPLICATION, WITHOUT LONG-TERM CURRENT USE OF INSULIN: Primary | ICD-10-CM

## 2025-02-13 LAB
ALBUMIN SERPL-MCNC: 4.1 G/DL (ref 3.6–5.1)
ALP SERPL-CCNC: 57 U/L (ref 35–144)
ALT SERPL-CCNC: 21 U/L (ref 9–46)
ANION GAP SERPL CALCULATED.4IONS-SCNC: 9 MMOL/L (CALC) (ref 7–17)
AST SERPL-CCNC: 27 U/L (ref 10–35)
BILIRUB SERPL-MCNC: 0.7 MG/DL (ref 0.2–1.2)
BUN SERPL-MCNC: 18 MG/DL (ref 7–25)
CALCIUM SERPL-MCNC: 9.1 MG/DL (ref 8.6–10.3)
CHLORIDE SERPL-SCNC: 105 MMOL/L (ref 98–110)
CO2 SERPL-SCNC: 29 MMOL/L (ref 20–32)
CREAT SERPL-MCNC: 0.87 MG/DL (ref 0.7–1.22)
EGFRCR SERPLBLD CKD-EPI 2021: 85 ML/MIN/1.73M2
EST. AVERAGE GLUCOSE BLD GHB EST-MCNC: 151 MG/DL
EST. AVERAGE GLUCOSE BLD GHB EST-SCNC: 8.4 MMOL/L
GLUCOSE SERPL-MCNC: 119 MG/DL (ref 65–99)
HBA1C MFR BLD: 6.9 % OF TOTAL HGB
INR PPP: 2.2
POTASSIUM SERPL-SCNC: 4.7 MMOL/L (ref 3.5–5.3)
PROT SERPL-MCNC: 6.5 G/DL (ref 6.1–8.1)
PROTHROMBIN TIME: 21.8 SEC (ref 9–11.5)
SODIUM SERPL-SCNC: 143 MMOL/L (ref 135–146)

## 2025-02-28 DIAGNOSIS — I48.20 CHRONIC ATRIAL FIBRILLATION (MULTI): ICD-10-CM

## 2025-03-09 DIAGNOSIS — L03.115 CELLULITIS OF RIGHT LOWER EXTREMITY: ICD-10-CM

## 2025-03-09 DIAGNOSIS — E11.69 TYPE 2 DIABETES MELLITUS WITH OTHER SPECIFIED COMPLICATION, WITHOUT LONG-TERM CURRENT USE OF INSULIN: ICD-10-CM

## 2025-03-09 RX ORDER — FUROSEMIDE 40 MG/1
40 TABLET ORAL DAILY
Qty: 90 TABLET | Refills: 1 | Status: SHIPPED | OUTPATIENT
Start: 2025-03-09

## 2025-03-09 RX ORDER — METFORMIN HYDROCHLORIDE 500 MG/1
1000 TABLET ORAL 2 TIMES DAILY
Qty: 360 TABLET | Refills: 1 | Status: SHIPPED | OUTPATIENT
Start: 2025-03-09

## 2025-03-28 DIAGNOSIS — I48.20 CHRONIC ATRIAL FIBRILLATION (MULTI): ICD-10-CM

## 2025-04-25 DIAGNOSIS — I48.20 CHRONIC ATRIAL FIBRILLATION (MULTI): ICD-10-CM

## 2025-05-21 LAB
INR PPP: 2
PROTHROMBIN TIME: 20.2 SEC (ref 9–11.5)
PSA SERPL-MCNC: 0.1 NG/ML

## 2025-05-23 DIAGNOSIS — I48.20 CHRONIC ATRIAL FIBRILLATION (MULTI): ICD-10-CM

## 2025-05-27 ENCOUNTER — OFFICE VISIT (OUTPATIENT)
Dept: CARDIOLOGY | Facility: CLINIC | Age: 85
End: 2025-05-27
Payer: MEDICARE

## 2025-05-27 VITALS
HEIGHT: 71 IN | BODY MASS INDEX: 37.52 KG/M2 | HEART RATE: 76 BPM | WEIGHT: 268 LBS | SYSTOLIC BLOOD PRESSURE: 93 MMHG | TEMPERATURE: 97.8 F | DIASTOLIC BLOOD PRESSURE: 57 MMHG

## 2025-05-27 DIAGNOSIS — R06.02 SHORTNESS OF BREATH: ICD-10-CM

## 2025-05-27 DIAGNOSIS — I50.32 CHRONIC DIASTOLIC HEART FAILURE: Primary | ICD-10-CM

## 2025-05-27 DIAGNOSIS — R60.0 LOCALIZED EDEMA: ICD-10-CM

## 2025-05-27 DIAGNOSIS — I10 BENIGN ESSENTIAL HYPERTENSION: ICD-10-CM

## 2025-05-27 DIAGNOSIS — I48.20 CHRONIC ATRIAL FIBRILLATION (MULTI): ICD-10-CM

## 2025-05-27 DIAGNOSIS — I36.1 NONRHEUMATIC TRICUSPID VALVE REGURGITATION: ICD-10-CM

## 2025-05-27 DIAGNOSIS — E11.69 TYPE 2 DIABETES MELLITUS WITH OTHER SPECIFIED COMPLICATION, WITHOUT LONG-TERM CURRENT USE OF INSULIN: ICD-10-CM

## 2025-05-27 DIAGNOSIS — E78.5 HYPERLIPIDEMIA, UNSPECIFIED HYPERLIPIDEMIA TYPE: ICD-10-CM

## 2025-05-27 PROCEDURE — 1036F TOBACCO NON-USER: CPT | Performed by: NURSE PRACTITIONER

## 2025-05-27 PROCEDURE — 99212 OFFICE O/P EST SF 10 MIN: CPT | Performed by: NURSE PRACTITIONER

## 2025-05-27 PROCEDURE — 3078F DIAST BP <80 MM HG: CPT | Performed by: NURSE PRACTITIONER

## 2025-05-27 PROCEDURE — 3074F SYST BP LT 130 MM HG: CPT | Performed by: NURSE PRACTITIONER

## 2025-05-27 PROCEDURE — 99214 OFFICE O/P EST MOD 30 MIN: CPT | Performed by: NURSE PRACTITIONER

## 2025-05-27 PROCEDURE — 1159F MED LIST DOCD IN RCRD: CPT | Performed by: NURSE PRACTITIONER

## 2025-05-27 PROCEDURE — 1160F RVW MEDS BY RX/DR IN RCRD: CPT | Performed by: NURSE PRACTITIONER

## 2025-05-27 RX ORDER — LISINOPRIL 5 MG/1
5 TABLET ORAL DAILY
Start: 2025-05-27 | End: 2026-05-27

## 2025-05-27 RX ORDER — SIMVASTATIN 20 MG/1
20 TABLET, FILM COATED ORAL DAILY
Qty: 90 TABLET | Refills: 3 | Status: SHIPPED | OUTPATIENT
Start: 2025-05-27 | End: 2026-05-27

## 2025-05-27 RX ORDER — METOPROLOL TARTRATE 50 MG/1
25 TABLET ORAL 2 TIMES DAILY
Qty: 90 TABLET | Refills: 3 | Status: SHIPPED | OUTPATIENT
Start: 2025-05-27 | End: 2026-05-27

## 2025-05-27 NOTE — PROGRESS NOTES
"Chief Complaint:   Chronic Atrial Fibrillation     History Of Present Illness:    Kaden Olivas is a 85 y.o. male here with Chronic Atrial Fibrillation.  The patient has been asymptomatic.  The patient has been rate-controlled in atrial fibrillation on medical treatment which they are tolerating well and are compliant.  The current treatment strategy is rate control.  The CHADS2-VASC2 score is 4 and the ACC/AHA guidelines recommend anticoagulation for stroke prophylaxis.  The patient is being treated with Coumadin and has tolerated treatment with no bleeding and is compliant.     He does note SOB, but only with extreme exertion (IE climbing a hill). He denies having it with ADL and walking. Remains unchanged.     LE edema remains well controlled.     Denies palpitations, lightheadedness and dizziness.     Does report getting off balanced, particularly when he leans forward.     Hx of 4 UTI.     TTE 10/17/2022  EF 50-55%  LA severely dilated  RA moderately dilated  Moderate tricuspid regurgitation  Small PFO    Nuclear Stress test 10/13/2022  Normal Stress ECG  No evidence of ischemia or scar        Allergies:  Patient has no known allergies.    Review of Systems  All pertinent systems have been reviewed and are negative except for what is stated in the history of present illness.    All other systems have been reviewed and are negative and noncontributory to this patient's current ailments.     Visit Vitals  BP 93/57 (BP Location: Right arm)   Pulse 76   Temp 36.6 °C (97.8 °F)   Ht 1.791 m (5' 10.5\")   Wt 122 kg (268 lb)   BMI 37.91 kg/m²   Smoking Status Former   BSA 2.46 m²       Last Labs:  CBC -  Lab Results   Component Value Date    WBC 7.9 09/12/2024    HGB 11.4 (L) 09/12/2024    HCT 35.7 (L) 09/12/2024    MCV 97 09/12/2024     09/12/2024       CMP -  Lab Results   Component Value Date    CALCIUM 9.1 02/12/2025    PHOS 2.9 01/24/2023    PROT 6.5 02/12/2025    ALBUMIN 4.1 02/12/2025    AST 27 " 02/12/2025    ALT 21 02/12/2025    ALKPHOS 57 02/12/2025    BILITOT 0.7 02/12/2025       LIPID PANEL -   Lab Results   Component Value Date    CHOL 99 03/11/2024    TRIG 103 03/11/2024    HDL 40.5 03/11/2024    CHHDL 2.4 03/11/2024    LDLF 55 10/26/2022    VLDL 21 03/11/2024    NHDL 59 03/11/2024       RENAL FUNCTION PANEL -   Lab Results   Component Value Date    GLUCOSE 119 (H) 02/12/2025     02/12/2025    K 4.7 02/12/2025     02/12/2025    CO2 29 02/12/2025    ANIONGAP 9 02/12/2025    BUN 18 02/12/2025    CREATININE 0.87 02/12/2025    GFRMALE 90 08/18/2023    CALCIUM 9.1 02/12/2025    PHOS 2.9 01/24/2023    ALBUMIN 4.1 02/12/2025        Lab Results   Component Value Date     (H) 10/16/2024    HGBA1C 6.9 (H) 02/12/2025     Objective   Vitals reviewed.   Constitutional:       Appearance: Healthy appearance. Not in distress.   Neck:      Vascular: No carotid bruit or JVR. JVD normal.   Pulmonary:      Effort: Pulmonary effort is normal.      Breath sounds: Normal breath sounds. No wheezing. No rhonchi. No rales.   Chest:      Chest wall: Not tender to palpatation.   Cardiovascular:      PMI at left midclavicular line. Normal rate. Irregularly irregular rhythm. Normal S1. Normal S2.       Murmurs: There is no murmur.      No gallop.  No click. No rub.   Edema:     Peripheral edema absent.   Abdominal:      General: Bowel sounds are normal.      Palpations: Abdomen is soft.      Tenderness: There is no abdominal tenderness.   Musculoskeletal:         General: No tenderness. Skin:     General: Skin is warm and dry.   Neurological:      General: No focal deficit present.      Mental Status: Alert and oriented to person, place and time.   Psychiatric:         Attention and Perception: Attention normal.         Mood and Affect: Mood normal.         Speech: Speech normal.       Assessment/Plan   Diagnoses and all orders for this visit:  Chronic Diastolic Heart Failure  -   - continue lasix,  lisinopril and metoprolol  - decreased lisinopril due to soft bp   - LE edema well controlled on lasix   - BP will not tolerate entresto  - Cannot start Sg12 inhibitors due to a history of frequent UTIs   Chronic atrial fibrillation (CMS/HCC)  - Rate controlled  - Asymptomatic  - Continue coumadin, INR 2-3  - continue metoprolol, rate controlled   Benign essential hypertension  - soft, cut lisinopril in 1/2  - feeling off balance, soft bp may be contributing   - no change   Hyperlipidemia, unspecified hyperlipidemia type  - simvastatin  Nonrheumatic tricuspid valve regurgitation  - TTE PRN   SOB (shortness of breath) on exertion  - remains unchanged   - right side of heart dilated/enlarged, I suspect some underlying lung disease. History of smoking and steel . Since SOB does not impact his daily life he does not want to pursue a pulmonary consult   Lower extremity edema  - well controlled with lasix and compression socks     Follow up in 6 months     Outpatient Medications:  Current Outpatient Medications   Medication Instructions    amoxicillin (Amoxil) 500 mg capsule TAKE FOUR CAPSULES BY MOUTH ONE HOUR BEFORE APPOINTMENT    cholecalciferol (Vitamin D-3) 50 mcg (2,000 unit) capsule [facility] Vitamin D 2000 UNIT CAPS   Refills: 0       Active    cyanocobalamin (Vitamin B-12) 100 mcg tablet 1 tablet, Daily    furosemide (LASIX) 40 mg, oral, Daily    glimepiride (AMARYL) 2 mg, oral, Daily before breakfast    lisinopril 5 mg, oral, Daily    metFORMIN (GLUCOPHAGE) 1,000 mg, oral, 2 times daily    metoprolol tartrate (Lopressor) 50 mg tablet oral, 2 times daily    oxyCODONE-acetaminophen (Percocet) 5-325 mg tablet 1 tablet, Every 6 hours PRN    simvastatin (ZOCOR) 20 mg, oral, Daily    terazosin (HYTRIN) 10 mg, Daily    vit C,I-Pp-bhqql-lutein-zeaxan (PreserVision AREDS-2) 250-90-40-1 mg capsule Take by mouth.    warfarin (COUMADIN) 5 mg, oral, Every other day, Take 2 tab on Thrusday     Exclusive of any other  services or procedures performed, I, Lyssa CELESTE, spent 21 minutes in duration for this visit today.  This time consisted of chart review, obtaining history, and/or performing the exam as documented above, as well as, documenting the clinical information for the encounter in the electronic record, discussing treatment options, plans, and/or goals with patient, family, and/or caregiver, refilling medications, updating the electronic record, ordering medicines, lab work, imaging, referrals, and/or procedures as documented above and communicating with other [facility] professionals. I have discussed the results of laboratory, radiology, and cardiology studies with the patient and their family/caregiver.         I reviewed PCP  note, labs

## 2025-05-29 ENCOUNTER — APPOINTMENT (OUTPATIENT)
Dept: UROLOGY | Facility: CLINIC | Age: 85
End: 2025-05-29
Payer: MEDICARE

## 2025-05-29 VITALS — HEART RATE: 84 BPM | DIASTOLIC BLOOD PRESSURE: 70 MMHG | SYSTOLIC BLOOD PRESSURE: 117 MMHG

## 2025-05-29 DIAGNOSIS — C61 PROSTATE CANCER (MULTI): ICD-10-CM

## 2025-05-29 PROCEDURE — 96402 CHEMO HORMON ANTINEOPL SQ/IM: CPT | Performed by: UROLOGY

## 2025-06-05 DIAGNOSIS — I10 BENIGN ESSENTIAL HYPERTENSION: ICD-10-CM

## 2025-06-05 DIAGNOSIS — E11.69 TYPE 2 DIABETES MELLITUS WITH OTHER SPECIFIED COMPLICATION, WITHOUT LONG-TERM CURRENT USE OF INSULIN: ICD-10-CM

## 2025-06-06 RX ORDER — LISINOPRIL 10 MG/1
10 TABLET ORAL DAILY
Qty: 90 TABLET | Refills: 3 | OUTPATIENT
Start: 2025-06-06

## 2025-06-06 RX ORDER — GLIMEPIRIDE 2 MG/1
2 TABLET ORAL
Qty: 90 TABLET | Refills: 3 | Status: SHIPPED | OUTPATIENT
Start: 2025-06-06

## 2025-06-08 DIAGNOSIS — I10 BENIGN ESSENTIAL HYPERTENSION: ICD-10-CM

## 2025-06-09 RX ORDER — LISINOPRIL 10 MG/1
10 TABLET ORAL DAILY
Qty: 90 TABLET | Refills: 0 | OUTPATIENT
Start: 2025-06-09

## 2025-06-20 DIAGNOSIS — I10 BENIGN ESSENTIAL HYPERTENSION: ICD-10-CM

## 2025-06-20 DIAGNOSIS — I48.20 CHRONIC ATRIAL FIBRILLATION (MULTI): ICD-10-CM

## 2025-06-23 DIAGNOSIS — I48.20 CHRONIC ATRIAL FIBRILLATION (MULTI): ICD-10-CM

## 2025-06-23 RX ORDER — LISINOPRIL 10 MG/1
10 TABLET ORAL DAILY
Qty: 90 TABLET | Refills: 0 | OUTPATIENT
Start: 2025-06-23

## 2025-06-25 RX ORDER — WARFARIN 10 MG/1
10 TABLET ORAL
Qty: 90 TABLET | Refills: 0 | Status: SHIPPED | OUTPATIENT
Start: 2025-06-25

## 2025-06-30 ENCOUNTER — TELEPHONE (OUTPATIENT)
Dept: CARDIOLOGY | Facility: CLINIC | Age: 85
End: 2025-06-30

## 2025-06-30 ENCOUNTER — APPOINTMENT (OUTPATIENT)
Dept: PRIMARY CARE | Facility: CLINIC | Age: 85
End: 2025-06-30
Payer: MEDICARE

## 2025-06-30 VITALS
DIASTOLIC BLOOD PRESSURE: 62 MMHG | SYSTOLIC BLOOD PRESSURE: 118 MMHG | WEIGHT: 268.6 LBS | TEMPERATURE: 97.3 F | BODY MASS INDEX: 38 KG/M2

## 2025-06-30 DIAGNOSIS — I48.20 CHRONIC ATRIAL FIBRILLATION (MULTI): ICD-10-CM

## 2025-06-30 DIAGNOSIS — I50.32 CHRONIC DIASTOLIC HEART FAILURE: ICD-10-CM

## 2025-06-30 DIAGNOSIS — I10 BENIGN ESSENTIAL HYPERTENSION: ICD-10-CM

## 2025-06-30 DIAGNOSIS — M79.89 LEG SWELLING: Primary | ICD-10-CM

## 2025-06-30 LAB — POC INR: 2.7 (ref 0.9–1.1)

## 2025-06-30 PROCEDURE — G2211 COMPLEX E/M VISIT ADD ON: HCPCS | Performed by: FAMILY MEDICINE

## 2025-06-30 PROCEDURE — 1159F MED LIST DOCD IN RCRD: CPT | Performed by: FAMILY MEDICINE

## 2025-06-30 PROCEDURE — 1036F TOBACCO NON-USER: CPT | Performed by: FAMILY MEDICINE

## 2025-06-30 PROCEDURE — 99214 OFFICE O/P EST MOD 30 MIN: CPT | Performed by: FAMILY MEDICINE

## 2025-06-30 PROCEDURE — 1160F RVW MEDS BY RX/DR IN RCRD: CPT | Performed by: FAMILY MEDICINE

## 2025-06-30 PROCEDURE — 3078F DIAST BP <80 MM HG: CPT | Performed by: FAMILY MEDICINE

## 2025-06-30 PROCEDURE — 3074F SYST BP LT 130 MM HG: CPT | Performed by: FAMILY MEDICINE

## 2025-06-30 PROCEDURE — 85610 PROTHROMBIN TIME: CPT | Performed by: FAMILY MEDICINE

## 2025-06-30 RX ORDER — LISINOPRIL 5 MG/1
5 TABLET ORAL DAILY
Qty: 90 TABLET | Refills: 3 | Status: SHIPPED | OUTPATIENT
Start: 2025-06-30 | End: 2026-06-30

## 2025-06-30 NOTE — PATIENT INSTRUCTIONS
Increase lasix 40 mg to 2 times a day for next 48-72 hrs. Let me know if swelling not better then will get ultrasound

## 2025-06-30 NOTE — PROGRESS NOTES
"Subjective   Patient ID: Cade Olivas \"Louise" is a 85 y.o. male who presents for Leg Swelling.    HPI   Right leg swelling x 2 weeks   He states he felt he had been doing well prior and 1 month prior his cardiac team decrease his lisinopril to 10 mg due to relatively low blood pressure  He believes that the swelling began after this  He has been using compression stockings although he feels they may be worsening his symptoms as they are taking and significantly around the ankles and at the top of the calf  His home weight has been stable at 268  He is not having any shortness of breath or chest pain  No fevers or chills, nausea, vomiting  No skin breakdown that he has noticed  He states his leg has been chronically slightly reddish-pink since bed cellulitis last year but he has not noticed any increased warmth  Review of Systems  As noted per hpi   Objective   /62   Temp 36.3 °C (97.3 °F)   Wt 122 kg (268 lb 9.6 oz)   BMI 38.00 kg/m²     Physical Exam  Vitals and nursing note reviewed.   Constitutional:       Appearance: Normal appearance.   HENT:      Head: Normocephalic and atraumatic.      Nose: Nose normal.      Mouth/Throat:      Mouth: Mucous membranes are moist.   Eyes:      Pupils: Pupils are equal, round, and reactive to light.   Cardiovascular:      Rate and Rhythm: Normal rate and regular rhythm.      Pulses: Normal pulses.      Heart sounds: Normal heart sounds.   Pulmonary:      Effort: Pulmonary effort is normal. No respiratory distress.      Breath sounds: Normal breath sounds. No rales.   Musculoskeletal:         General: No swelling. Normal range of motion.      Cervical back: Normal range of motion and neck supple.      Right lower leg: Edema present.      Left lower leg: Edema present.   Lymphadenopathy:      Cervical: No cervical adenopathy.   Skin:     Capillary Refill: Capillary refill takes less than 2 seconds.   Neurological:      General: No focal deficit present.      Mental " Status: He is alert. Mental status is at baseline.   Psychiatric:         Mood and Affect: Mood normal.         Behavior: Behavior normal.         Thought Content: Thought content normal.         Judgment: Judgment normal.         Assessment/Plan   1. Leg swelling        2. Chronic diastolic heart failure        3. Chronic atrial fibrillation (Multi)  POCT INR manually resulted        Discussed with patient I do not believe there is signs of cellulitis at this point.  His INR is good and he has not missed any doses of his Coumadin so less likely blood clot.  He has no other peripheral signs of CHF and his weight has been stable.  At this point we will have him do Lasix 2 times a day x 2 to 3 days.  If no improvement of his symptoms we will get chest x-ray and Doppler plus labs  Discussed emergent symptoms and when to go to the ER if they develop  Patient and wife verbalized understanding plan of care and all questions were answered      Ivan Parrish, DO

## 2025-07-07 DIAGNOSIS — M79.89 LEG SWELLING: ICD-10-CM

## 2025-07-07 DIAGNOSIS — I48.20 CHRONIC ATRIAL FIBRILLATION (MULTI): ICD-10-CM

## 2025-07-07 DIAGNOSIS — I50.32 CHRONIC DIASTOLIC HEART FAILURE: Primary | ICD-10-CM

## 2025-07-08 LAB
ANION GAP SERPL CALCULATED.4IONS-SCNC: 12 MMOL/L (CALC) (ref 7–17)
BASOPHILS # BLD AUTO: 61 CELLS/UL (ref 0–200)
BASOPHILS NFR BLD AUTO: 0.9 %
BNP SERPL-MCNC: 142 PG/ML
BUN SERPL-MCNC: 16 MG/DL (ref 7–25)
BUN/CREAT SERPL: NORMAL (CALC) (ref 6–22)
CALCIUM SERPL-MCNC: 9.2 MG/DL (ref 8.6–10.3)
CHLORIDE SERPL-SCNC: 103 MMOL/L (ref 98–110)
CO2 SERPL-SCNC: 27 MMOL/L (ref 20–32)
CREAT SERPL-MCNC: 0.94 MG/DL (ref 0.7–1.22)
EGFRCR SERPLBLD CKD-EPI 2021: 79 ML/MIN/1.73M2
EOSINOPHIL # BLD AUTO: 177 CELLS/UL (ref 15–500)
EOSINOPHIL NFR BLD AUTO: 2.6 %
ERYTHROCYTE [DISTWIDTH] IN BLOOD BY AUTOMATED COUNT: 14 % (ref 11–15)
GLUCOSE SERPL-MCNC: 137 MG/DL (ref 65–139)
HCT VFR BLD AUTO: 33.8 % (ref 38.5–50)
HGB BLD-MCNC: 10.7 G/DL (ref 13.2–17.1)
LYMPHOCYTES # BLD AUTO: 1870 CELLS/UL (ref 850–3900)
LYMPHOCYTES NFR BLD AUTO: 27.5 %
MCH RBC QN AUTO: 30.6 PG (ref 27–33)
MCHC RBC AUTO-ENTMCNC: 31.7 G/DL (ref 32–36)
MCV RBC AUTO: 96.6 FL (ref 80–100)
MONOCYTES # BLD AUTO: 768 CELLS/UL (ref 200–950)
MONOCYTES NFR BLD AUTO: 11.3 %
NEUTROPHILS # BLD AUTO: 3924 CELLS/UL (ref 1500–7800)
NEUTROPHILS NFR BLD AUTO: 57.7 %
PLATELET # BLD AUTO: 191 THOUSAND/UL (ref 140–400)
PMV BLD REES-ECKER: 10.5 FL (ref 7.5–12.5)
POTASSIUM SERPL-SCNC: 4.5 MMOL/L (ref 3.5–5.3)
RBC # BLD AUTO: 3.5 MILLION/UL (ref 4.2–5.8)
SODIUM SERPL-SCNC: 142 MMOL/L (ref 135–146)
WBC # BLD AUTO: 6.8 THOUSAND/UL (ref 3.8–10.8)

## 2025-07-09 ENCOUNTER — TELEPHONE (OUTPATIENT)
Dept: PRIMARY CARE | Facility: CLINIC | Age: 85
End: 2025-07-09
Payer: MEDICARE

## 2025-07-09 DIAGNOSIS — I50.32 CHRONIC DIASTOLIC HEART FAILURE: Primary | ICD-10-CM

## 2025-07-09 NOTE — TELEPHONE ENCOUNTER
----- Message from Ivan Parrish sent at 7/9/2025  1:53 PM EDT -----  Patient know his labs look stable at his baseline.  No concern of heart failure.  How is his leg doing?  If still swollen or more swollen I would have him call Dr. Gillespie and increase lasix back up to   2/day   ----- Message -----  From: [facility] Results In  Sent: 7/8/2025   6:55 AM EDT  To: Ivan Parrish, DO

## 2025-07-09 NOTE — TELEPHONE ENCOUNTER
Spoke with Kelsy (sánchez was driving) they states when the swelling first started they measured his ankle and it was 22in around swelling went down some and is stable at 19in. They stated Dr. Gillespie is no longer in practice. Should they call Lyssa Mckeon?

## 2025-07-09 NOTE — RESULT ENCOUNTER NOTE
Patient know his labs look stable at his baseline.  No concern of heart failure.  How is his leg doing?  If still swollen or more swollen I would have him call Dr. Gillespie and increase lasix back up to 2/day

## 2025-07-11 NOTE — TELEPHONE ENCOUNTER
I see he is set up to see a new vascular doctor in about a month.  I would keep that appointment.  In the meantime if he is able to get his compression stockings back on at regular compression and he can take the Lasix 2 times a day and recheck labs in 2 weeks

## 2025-07-18 DIAGNOSIS — I48.20 CHRONIC ATRIAL FIBRILLATION (MULTI): ICD-10-CM

## 2025-07-24 LAB
ANION GAP SERPL CALCULATED.4IONS-SCNC: 10 MMOL/L (CALC) (ref 7–17)
BUN SERPL-MCNC: 25 MG/DL (ref 7–25)
BUN/CREAT SERPL: NORMAL (CALC) (ref 6–22)
CALCIUM SERPL-MCNC: 9.3 MG/DL (ref 8.6–10.3)
CHLORIDE SERPL-SCNC: 103 MMOL/L (ref 98–110)
CO2 SERPL-SCNC: 29 MMOL/L (ref 20–32)
CREAT SERPL-MCNC: 1.08 MG/DL (ref 0.7–1.22)
EGFRCR SERPLBLD CKD-EPI 2021: 67 ML/MIN/1.73M2
GLUCOSE SERPL-MCNC: 130 MG/DL (ref 65–139)
INR PPP: 1.8
POTASSIUM SERPL-SCNC: 4.3 MMOL/L (ref 3.5–5.3)
PROTHROMBIN TIME: 18.2 SEC (ref 9–11.5)
SODIUM SERPL-SCNC: 142 MMOL/L (ref 135–146)

## 2025-07-31 ENCOUNTER — APPOINTMENT (OUTPATIENT)
Dept: VASCULAR SURGERY | Facility: HOSPITAL | Age: 85
End: 2025-07-31
Payer: MEDICARE

## 2025-08-06 ENCOUNTER — APPOINTMENT (OUTPATIENT)
Dept: PRIMARY CARE | Facility: CLINIC | Age: 85
End: 2025-08-06
Payer: MEDICARE

## 2025-08-06 VITALS
TEMPERATURE: 97.4 F | BODY MASS INDEX: 38.57 KG/M2 | HEIGHT: 70 IN | HEART RATE: 65 BPM | SYSTOLIC BLOOD PRESSURE: 140 MMHG | OXYGEN SATURATION: 95 % | WEIGHT: 269.4 LBS | DIASTOLIC BLOOD PRESSURE: 80 MMHG

## 2025-08-06 DIAGNOSIS — C61 PROSTATE CANCER (MULTI): ICD-10-CM

## 2025-08-06 DIAGNOSIS — M79.89 LEG SWELLING: ICD-10-CM

## 2025-08-06 DIAGNOSIS — E11.69 TYPE 2 DIABETES MELLITUS WITH OTHER SPECIFIED COMPLICATION, WITHOUT LONG-TERM CURRENT USE OF INSULIN: ICD-10-CM

## 2025-08-06 DIAGNOSIS — E78.5 HYPERLIPIDEMIA, UNSPECIFIED HYPERLIPIDEMIA TYPE: ICD-10-CM

## 2025-08-06 DIAGNOSIS — F40.298 FEAR OF FALLING: ICD-10-CM

## 2025-08-06 DIAGNOSIS — I48.20 CHRONIC ATRIAL FIBRILLATION (MULTI): ICD-10-CM

## 2025-08-06 DIAGNOSIS — Z00.00 ROUTINE GENERAL MEDICAL EXAMINATION AT HEALTH CARE FACILITY: Primary | ICD-10-CM

## 2025-08-06 DIAGNOSIS — I50.32 CHRONIC DIASTOLIC HEART FAILURE: ICD-10-CM

## 2025-08-06 PROCEDURE — 3077F SYST BP >= 140 MM HG: CPT | Performed by: FAMILY MEDICINE

## 2025-08-06 PROCEDURE — 1170F FXNL STATUS ASSESSED: CPT | Performed by: FAMILY MEDICINE

## 2025-08-06 PROCEDURE — 99214 OFFICE O/P EST MOD 30 MIN: CPT | Performed by: FAMILY MEDICINE

## 2025-08-06 PROCEDURE — 1160F RVW MEDS BY RX/DR IN RCRD: CPT | Performed by: FAMILY MEDICINE

## 2025-08-06 PROCEDURE — 3079F DIAST BP 80-89 MM HG: CPT | Performed by: FAMILY MEDICINE

## 2025-08-06 PROCEDURE — 1159F MED LIST DOCD IN RCRD: CPT | Performed by: FAMILY MEDICINE

## 2025-08-06 PROCEDURE — G0439 PPPS, SUBSEQ VISIT: HCPCS | Performed by: FAMILY MEDICINE

## 2025-08-06 RX ORDER — FUROSEMIDE 40 MG/1
40 TABLET ORAL DAILY
Qty: 90 TABLET | Refills: 1 | Status: SHIPPED | OUTPATIENT
Start: 2025-08-06

## 2025-08-06 ASSESSMENT — PATIENT HEALTH QUESTIONNAIRE - PHQ9
2. FEELING DOWN, DEPRESSED OR HOPELESS: NOT AT ALL
SUM OF ALL RESPONSES TO PHQ9 QUESTIONS 1 AND 2: 0
1. LITTLE INTEREST OR PLEASURE IN DOING THINGS: NOT AT ALL
SUM OF ALL RESPONSES TO PHQ9 QUESTIONS 1 AND 2: 0
2. FEELING DOWN, DEPRESSED OR HOPELESS: NOT AT ALL
1. LITTLE INTEREST OR PLEASURE IN DOING THINGS: NOT AT ALL

## 2025-08-06 ASSESSMENT — ACTIVITIES OF DAILY LIVING (ADL)
DRESSING: INDEPENDENT
DOING_HOUSEWORK: INDEPENDENT
BATHING: INDEPENDENT
MANAGING_FINANCES: INDEPENDENT
GROCERY_SHOPPING: INDEPENDENT
TAKING_MEDICATION: INDEPENDENT

## 2025-08-06 ASSESSMENT — ENCOUNTER SYMPTOMS
NERVOUS/ANXIOUS: 0
SINUS PAIN: 0
NAUSEA: 0
MYALGIAS: 0
COUGH: 0
PALPITATIONS: 0
ARTHRALGIAS: 0
SLEEP DISTURBANCE: 0
DIARRHEA: 0
WEAKNESS: 0
ABDOMINAL PAIN: 0
CONSTIPATION: 0
DYSPHORIC MOOD: 0
VOMITING: 0
BLOOD IN STOOL: 0
DIFFICULTY URINATING: 0
ADENOPATHY: 0
ACTIVITY CHANGE: 0
APPETITE CHANGE: 0
FEVER: 0
HEADACHES: 0
SHORTNESS OF BREATH: 0
BRUISES/BLEEDS EASILY: 1
EYE PAIN: 0

## 2025-08-06 NOTE — PROGRESS NOTES
Subjective   Reason for Visit: Cade Olivas is an 85 y.o. male here for a Medicare Wellness visit.     CARE TEAM  urology- Tirso Camacho follows for erectile dysfunction, BPH as well as prostate cancer status post radiation therapy in 2014. Now on active surveillance  ortho- Silver -follows as needed for knee replacement 10/18  vascular-may thurner - new appt next week   Cardio- Mike/Oscar- appt 12/25     Taking all meds as directed for DM2, HTN, HPL. CHF, A fib  No bleeding issues   Tries to make healthy food choices     For his leg swelling Compression stockings + increased lasix  are helping swelling       Anemia - chronic and stable. No bleeding. Declines work up       Fear of falling since fall about year ago-tripped  He states now he is very careful when he is walking around to make sure there is nothing he can trip on  He walks with a cane most of the time for additional balance support  No dizziness chest pain or shortness of breath      Past Medical, Surgical, and Family History reviewed and updated in chart.    Reviewed all medications by prescribing practitioner or clinical pharmacist (such as prescriptions, OTCs, herbal therapies and supplements) and documented in the medical record.    HPI    Patient Self Assessment of Health Status  Patient Self Assessment: Good    Nutrition and Exercise  Current Diet: Well Balanced Diet  Adequate Fluid Intake: Yes  Caffeine: Yes  Exercise Frequency: Infrequently    Functional Ability/Level of Safety  Cognitive Impairment Observed: No cognitive impairment observed  Cognitive Impairment Reported: No cognitive impairment reported by patient or family    Home Safety Risk Factors: None    Patient Care Team:  Ivan Parrish DO as PCP - General     Review of Systems   Constitutional:  Negative for activity change, appetite change and fever.   HENT:  Negative for congestion, ear pain and sinus pain.    Eyes:  Negative for pain and visual disturbance.   Respiratory:   "Negative for cough and shortness of breath.    Cardiovascular:  Negative for chest pain, palpitations and leg swelling.   Gastrointestinal:  Negative for abdominal pain, blood in stool, constipation, diarrhea, nausea and vomiting.   Endocrine: Negative for cold intolerance and heat intolerance.   Genitourinary:  Negative for difficulty urinating.   Musculoskeletal:  Negative for arthralgias, gait problem and myalgias.   Skin:  Negative for rash.   Neurological:  Negative for weakness and headaches.   Hematological:  Negative for adenopathy. Bruises/bleeds easily.   Psychiatric/Behavioral:  Negative for dysphoric mood and sleep disturbance. The patient is not nervous/anxious.        Objective   Vitals:  /80   Pulse 65   Temp 36.3 °C (97.4 °F)   Ht 1.778 m (5' 10\")   Wt 122 kg (269 lb 6.4 oz)   SpO2 95%   BMI 38.65 kg/m²       Physical Exam  Vitals and nursing note reviewed.   Constitutional:       Appearance: Normal appearance.   HENT:      Head: Normocephalic and atraumatic.      Nose: Nose normal.      Mouth/Throat:      Mouth: Mucous membranes are moist.     Eyes:      Pupils: Pupils are equal, round, and reactive to light.       Cardiovascular:      Rate and Rhythm: Normal rate and regular rhythm.      Pulses: Normal pulses.      Heart sounds: Normal heart sounds.   Pulmonary:      Effort: Pulmonary effort is normal.      Breath sounds: Normal breath sounds.     Musculoskeletal:         General: No swelling. Normal range of motion.      Cervical back: Normal range of motion and neck supple.      Right lower leg: Edema (mild) present.      Left lower leg: Edema (mild) present.     Skin:     Capillary Refill: Capillary refill takes less than 2 seconds.      Findings: Bruising (on forearms) present.     Neurological:      General: No focal deficit present.      Mental Status: He is alert. Mental status is at baseline.     Psychiatric:         Mood and Affect: Mood normal.         Behavior: Behavior " normal.         Thought Content: Thought content normal.         Judgment: Judgment normal.         Assessment/Plan   Problem List Items Addressed This Visit       Atrial fibrillation (Multi)    Hyperlipidemia    Prostate cancer (Multi)    Type 2 diabetes mellitus with other specified complication, without long-term current use of insulin    Relevant Orders    Hemoglobin A1C    Albumin-Creatinine Ratio, Urine Random    Chronic diastolic heart failure    Relevant Medications    furosemide (Lasix) 40 mg tablet    Other Relevant Orders    Comprehensive Metabolic Panel     Other Visit Diagnoses         Routine general medical examination at health care facility    -  Primary      Leg swelling        Relevant Medications    furosemide (Lasix) 40 mg tablet          Over all doing  Continue current medicines/regimen.   Agree with cardio work up- should have echo repeat  A1c will do with next inr   Cont with care team- urology for utis + hx prostate ca  Cont with cardio and vascular  6 month fu for labs and med follow up   Flu shot in fall

## 2025-08-07 LAB
ALBUMIN/CREAT UR: 13 MG/G CREAT
CREAT UR-MCNC: 86 MG/DL (ref 20–320)
MICROALBUMIN UR-MCNC: 1.1 MG/DL

## 2025-08-14 ENCOUNTER — APPOINTMENT (OUTPATIENT)
Dept: VASCULAR SURGERY | Facility: HOSPITAL | Age: 85
End: 2025-08-14
Payer: MEDICARE

## 2025-08-14 VITALS
DIASTOLIC BLOOD PRESSURE: 67 MMHG | BODY MASS INDEX: 38.6 KG/M2 | HEART RATE: 73 BPM | SYSTOLIC BLOOD PRESSURE: 148 MMHG | WEIGHT: 269 LBS

## 2025-08-14 DIAGNOSIS — I89.0 LYMPHEDEMA: Primary | ICD-10-CM

## 2025-08-14 DIAGNOSIS — M79.89 LEG SWELLING: ICD-10-CM

## 2025-08-14 PROCEDURE — 99214 OFFICE O/P EST MOD 30 MIN: CPT | Performed by: SURGERY

## 2025-08-14 PROCEDURE — 3077F SYST BP >= 140 MM HG: CPT | Performed by: SURGERY

## 2025-08-14 PROCEDURE — 1159F MED LIST DOCD IN RCRD: CPT | Performed by: SURGERY

## 2025-08-14 PROCEDURE — 3078F DIAST BP <80 MM HG: CPT | Performed by: SURGERY

## 2025-08-14 ASSESSMENT — ENCOUNTER SYMPTOMS
ARTHRALGIAS: 0
SHORTNESS OF BREATH: 0
SLEEP DISTURBANCE: 0
ADENOPATHY: 0
COLOR CHANGE: 0
COUGH: 0
SORE THROAT: 0
NUMBNESS: 0
NECK PAIN: 0
WHEEZING: 0
SPEECH DIFFICULTY: 0
SEIZURES: 0
CHILLS: 0
HEADACHES: 0
DIFFICULTY URINATING: 0
CONFUSION: 0
FACIAL ASYMMETRY: 0
LIGHT-HEADEDNESS: 0
FATIGUE: 0
NAUSEA: 0
WOUND: 0
ABDOMINAL PAIN: 0
JOINT SWELLING: 0
TROUBLE SWALLOWING: 0
WEAKNESS: 0
FEVER: 0
DIARRHEA: 0
VOMITING: 0
DIZZINESS: 0
CONSTIPATION: 0
PALPITATIONS: 0

## 2025-08-15 DIAGNOSIS — I48.20 CHRONIC ATRIAL FIBRILLATION (MULTI): ICD-10-CM

## 2025-08-15 LAB
ALBUMIN SERPL-MCNC: 4.1 G/DL (ref 3.6–5.1)
ALP SERPL-CCNC: 59 U/L (ref 35–144)
ALT SERPL-CCNC: 12 U/L (ref 9–46)
ANION GAP SERPL CALCULATED.4IONS-SCNC: 11 MMOL/L (CALC) (ref 7–17)
AST SERPL-CCNC: 13 U/L (ref 10–35)
BILIRUB SERPL-MCNC: 0.8 MG/DL (ref 0.2–1.2)
BUN SERPL-MCNC: 21 MG/DL (ref 7–25)
CALCIUM SERPL-MCNC: 9.2 MG/DL (ref 8.6–10.3)
CHLORIDE SERPL-SCNC: 108 MMOL/L (ref 98–110)
CO2 SERPL-SCNC: 25 MMOL/L (ref 20–32)
CREAT SERPL-MCNC: 0.95 MG/DL (ref 0.7–1.22)
EGFRCR SERPLBLD CKD-EPI 2021: 78 ML/MIN/1.73M2
EST. AVERAGE GLUCOSE BLD GHB EST-MCNC: 160 MG/DL
EST. AVERAGE GLUCOSE BLD GHB EST-SCNC: 8.9 MMOL/L
GLUCOSE SERPL-MCNC: 120 MG/DL (ref 65–99)
HBA1C MFR BLD: 7.2 %
INR PPP: 2
POTASSIUM SERPL-SCNC: 4.7 MMOL/L (ref 3.5–5.3)
PROT SERPL-MCNC: 6.3 G/DL (ref 6.1–8.1)
PROTHROMBIN TIME: 20 SEC (ref 9–11.5)
SODIUM SERPL-SCNC: 144 MMOL/L (ref 135–146)

## 2025-08-21 ENCOUNTER — EVALUATION (OUTPATIENT)
Dept: PHYSICAL THERAPY | Facility: HOSPITAL | Age: 85
End: 2025-08-21
Payer: MEDICARE

## 2025-08-21 DIAGNOSIS — R26.81 UNSTEADINESS ON FEET: ICD-10-CM

## 2025-08-21 DIAGNOSIS — F40.298 FEAR OF FALLING: Primary | ICD-10-CM

## 2025-08-21 PROCEDURE — 97161 PT EVAL LOW COMPLEX 20 MIN: CPT | Mod: GP | Performed by: PHYSICAL THERAPIST

## 2025-08-21 PROCEDURE — 97110 THERAPEUTIC EXERCISES: CPT | Mod: GP | Performed by: PHYSICAL THERAPIST

## 2025-08-30 DIAGNOSIS — E11.69 TYPE 2 DIABETES MELLITUS WITH OTHER SPECIFIED COMPLICATION, WITHOUT LONG-TERM CURRENT USE OF INSULIN: ICD-10-CM

## 2025-09-02 RX ORDER — METFORMIN HYDROCHLORIDE 500 MG/1
1000 TABLET ORAL 2 TIMES DAILY
Qty: 360 TABLET | Refills: 1 | Status: SHIPPED | OUTPATIENT
Start: 2025-09-02

## 2025-09-03 ENCOUNTER — EVALUATION (OUTPATIENT)
Dept: OCCUPATIONAL THERAPY | Facility: HOSPITAL | Age: 85
End: 2025-09-03
Payer: MEDICARE

## 2025-09-03 DIAGNOSIS — I89.0 LYMPHEDEMA: Primary | ICD-10-CM

## 2025-09-03 PROCEDURE — 97165 OT EVAL LOW COMPLEX 30 MIN: CPT | Mod: GO | Performed by: OCCUPATIONAL THERAPIST

## 2025-09-03 PROCEDURE — 97535 SELF CARE MNGMENT TRAINING: CPT | Mod: GO | Performed by: OCCUPATIONAL THERAPIST

## 2025-09-03 ASSESSMENT — ACTIVITIES OF DAILY LIVING (ADL): HOME_MANAGEMENT_TIME_ENTRY: 20

## 2026-02-09 ENCOUNTER — APPOINTMENT (OUTPATIENT)
Dept: PRIMARY CARE | Facility: CLINIC | Age: 86
End: 2026-02-09
Payer: MEDICARE

## (undated) DEVICE — APPLICATOR, CHLORAPREP, W/ORANGE TINT, 26ML

## (undated) DEVICE — INTRODUCER, SHEATH, AVANTI PLUS, W/MINI GUIDEWIRE, 9 FR X 11 CM

## (undated) DEVICE — DRAPE, X-RAY, CASSETTE, LARGE

## (undated) DEVICE — TOWEL PACK, STERILE, 4/PACK, BLUE

## (undated) DEVICE — SYRINGE, 5 CC, LUER LOCK

## (undated) DEVICE — GOWN, ASTOUND, XL

## (undated) DEVICE — DRAPE, SHEET, THREE QUARTER, FAN FOLD, 57 X 77 IN

## (undated) DEVICE — GLOVE, RADIATION, ATTENUATION, SIZE-7.5, PF

## (undated) DEVICE — COUNTER, FOAM STRIP & NEEDLE

## (undated) DEVICE — SYRINGE, 30 CC, LUER LOCK

## (undated) DEVICE — DRAPE, SHEET, LAPAROTOMY, TRANSVERSE, W/FENESTRATION, 77 X 122 IN, DISPOSABLE, LF, STERILE

## (undated) DEVICE — SET-UP PACK, BASIC, MAYO STAND COVER, SUTURE BAG, TABLE COVER, LF

## (undated) DEVICE — DRESSING, TRANSPARENT, TEGADERM, 4 X 4-3/4 IN

## (undated) DEVICE — SOLUTION, IRRIGATION, STERILE WATER, 1000 ML, POUR BOTTLE

## (undated) DEVICE — DRAPE, SORBX, PERIPHERAL SHIELD, SINGLE LAYER

## (undated) DEVICE — DRESSING, GAUZE, SPONGE, VERSALON, ALL PURPOSE, 4 X 4 IN, SOFT

## (undated) DEVICE — DRAPE, C-ARM IMAGE

## (undated) DEVICE — GUIDEWIRE, ANGLE TIP,  .035 DIA, 180 CM, 3 CM TIP"

## (undated) DEVICE — SYRINGE, 20 CC, LUER LOCK

## (undated) DEVICE — LABELING SYSTEM, CORRECT MEDICATION, CATH LAB

## (undated) DEVICE — NEEDLE, PERCUTANEOUS ENTRY, THINWALL, W/O BASEPLATE, 18 G X 7 CM

## (undated) DEVICE — CATHETER, VISIONS PV 8.2 (IVUS)

## (undated) DEVICE — WIPE, INSTRUMENT, POLYVINYL ALCOHOL, 2 1/2 X 2 1/2